# Patient Record
Sex: FEMALE | Race: WHITE | Employment: OTHER | URBAN - METROPOLITAN AREA
[De-identification: names, ages, dates, MRNs, and addresses within clinical notes are randomized per-mention and may not be internally consistent; named-entity substitution may affect disease eponyms.]

---

## 2017-01-10 ENCOUNTER — OFFICE VISIT (OUTPATIENT)
Dept: INTERNAL MEDICINE CLINIC | Age: 82
End: 2017-01-10

## 2017-01-10 VITALS
RESPIRATION RATE: 18 BRPM | TEMPERATURE: 98.3 F | SYSTOLIC BLOOD PRESSURE: 111 MMHG | BODY MASS INDEX: 25.66 KG/M2 | HEIGHT: 65 IN | OXYGEN SATURATION: 94 % | HEART RATE: 78 BPM | DIASTOLIC BLOOD PRESSURE: 68 MMHG | WEIGHT: 154 LBS

## 2017-01-10 DIAGNOSIS — R05.9 COUGH: Primary | ICD-10-CM

## 2017-01-10 DIAGNOSIS — E78.5 HYPERLIPIDEMIA LDL GOAL <130: ICD-10-CM

## 2017-01-10 RX ORDER — ATORVASTATIN CALCIUM 10 MG/1
10 TABLET, FILM COATED ORAL DAILY
Qty: 30 TAB | Refills: 0
Start: 2017-01-10 | End: 2017-03-28 | Stop reason: ALTCHOICE

## 2017-01-10 RX ORDER — ALBUTEROL SULFATE 90 UG/1
1 AEROSOL, METERED RESPIRATORY (INHALATION)
Qty: 1 INHALER | Refills: 1 | Status: SHIPPED | OUTPATIENT
Start: 2017-01-10 | End: 2017-02-20

## 2017-01-10 RX ORDER — ALBUTEROL SULFATE 2 MG/5ML
2 SYRUP ORAL
Qty: 473 ML | Refills: 2 | Status: SHIPPED | OUTPATIENT
Start: 2017-01-10 | End: 2019-01-01

## 2017-01-10 RX ORDER — AZITHROMYCIN 250 MG/1
250 TABLET, FILM COATED ORAL SEE ADMIN INSTRUCTIONS
Qty: 6 TAB | Refills: 1 | Status: SHIPPED | OUTPATIENT
Start: 2017-01-10 | End: 2017-01-15

## 2017-01-10 NOTE — MR AVS SNAPSHOT
Visit Information Date & Time Provider Department Dept. Phone Encounter #  
 1/10/2017 11:00 AM Michelle Germain MD [facility] [id number] Upcoming Health Maintenance Date Due ZOSTER VACCINE AGE 60> 11/18/1984 GLAUCOMA SCREENING Q2Y 11/18/1989 MEDICARE YEARLY EXAM 7/15/2017 Pneumococcal 65+ High/Highest Risk (2 of 2 - PPSV23) 11/1/2017 DTaP/Tdap/Td series (2 - Td) 4/28/2024 Allergies as of 1/10/2017  Review Complete On: 1/10/2017 By: Michelle Germain MD  
  
 Severity Noted Reaction Type Reactions Latex  08/26/2012    Unknown (comments) Pt unable to report Codeine  08/26/2012    Unknown (comments) Sulfa Dyne  08/26/2012    Other (comments) \"Crying jag\" Current Immunizations  Reviewed on 10/14/2016 Name Date Influenza High Dose Vaccine PF 10/4/2016 Influenza Vaccine 10/1/2014, 11/1/2012 Pneumococcal Vaccine (Unspecified Type) 11/1/2012 Tdap 4/28/2014  2:44 AM  
  
 Not reviewed this visit You Were Diagnosed With   
  
 Codes Comments Essential hypertension    -  Primary ICD-10-CM: I10 
ICD-9-CM: 401.9 Cough     ICD-10-CM: R05 ICD-9-CM: 169. 2 Vitals BP Pulse Temp Resp Height(growth percentile) Weight(growth percentile) 111/68 (BP 1 Location: Left arm, BP Patient Position: Sitting) 78 98.3 °F (36.8 °C) (Oral) 18 5' 5\" (1.651 m) 154 lb (69.9 kg) SpO2 BMI OB Status Smoking Status 94% 25.63 kg/m2 Postmenopausal Former Smoker BMI and BSA Data Body Mass Index Body Surface Area  
 25.63 kg/m 2 1.79 m 2 Preferred Pharmacy Pharmacy Name Phone [facility] [phone] Your Updated Medication List  
  
   
This list is accurate as of: 1/10/17 12:02 PM.  Always use your most recent med list.  
  
  
  
  
 * albuterol 90 mcg/actuation inhaler Commonly known as:  PROVENTIL HFA, VENTOLIN HFA, PROAIR HFA Take 2 puffs by inhalation every four (4) hours as needed for Wheezing or Shortness of Breath. * albuterol 90 mcg/actuation inhaler Commonly known as:  PROVENTIL HFA, VENTOLIN HFA, PROAIR HFA Take 1 Puff by inhalation every six (6) hours as needed for Wheezing. Please assist pt with administration regarding timing of inhalation. IF pt can not do then use albuterol syrup * albuterol 2 mg/5 mL syrup Commonly known as:  Alonzo Ma Take 5 mL by mouth four (4) times daily as needed. Please give pt if she is coughing. If not improving need rx and nebulizer. ALEVE 220 mg Cap Generic drug:  naproxen sodium Take  by mouth. atorvastatin 10 mg tablet Commonly known as:  LIPITOR Take 1 Tab by mouth daily. azithromycin 250 mg tablet Commonly known as:  Bennetta Plum Take 1 Tab by mouth See Admin Instructions for 5 days. * Cholecalciferol (Vitamin D3) 50,000 unit Cap Take 1 Cap by mouth every seven (7) days. * cholecalciferol 1,000 unit Cap Commonly known as:  VITAMIN D3 Take 1 capsule by mouth daily  
  
 escitalopram oxalate 10 mg tablet Commonly known as:  Dayan Skates Take 1 Tab by mouth daily. guaiFENesin 200 mg/5 mL Liqd Take 5 mL by mouth three (3) times daily. ibuprofen 400 mg tablet Commonly known as:  MOTRIN Take 400 mg by mouth every six (6) hours as needed for Pain.  
  
 multivitamin tablet Commonly known as:  ONE A DAY Take 1 Tab by mouth daily. pneumococcal 13 romle conj dip 0.5 mL Syrg injection Commonly known as:  PREVNAR-13  
0.5 mL by IntraMUSCular route once for 1 dose. Indications: PREVENTION OF STREPTOCOCCUS PNEUMONIAE INFECTION  
  
 * Notice: This list has 5 medication(s) that are the same as other medications prescribed for you. Read the directions carefully, and ask your doctor or other care provider to review them with you. Prescriptions Printed Refills  
 albuterol (PROVENTIL HFA, VENTOLIN HFA, PROAIR HFA) 90 mcg/actuation inhaler 1 Sig: Take 1 Puff by inhalation every six (6) hours as needed for Wheezing. Please assist pt with administration regarding timing of inhalation. IF pt can not do then use albuterol syrup Class: Print Route: Inhalation  
 albuterol (PROVENTIL, VENTOLIN) 2 mg/5 mL syrup 2 Sig: Take 5 mL by mouth four (4) times daily as needed. Please give pt if she is coughing. If not improving need rx and nebulizer. Class: Print Route: Oral  
 azithromycin (ZITHROMAX) 250 mg tablet 1 Sig: Take 1 Tab by mouth See Admin Instructions for 5 days. Class: Print Route: Oral  
  
Prescriptions Sent to Pharmacy Refills  
 pneumococcal 13 romel conj dip (PREVNAR-13) 0.5 mL syrg injection 0 Si.5 mL by IntraMUSCular route once for 1 dose. Indications: PREVENTION OF STREPTOCOCCUS PNEUMONIAE INFECTION Class: Normal  
 Pharmacy: [address] [place] [address] Ph #: [phone] Route: IntraMUSCular  
 guaiFENesin 200 mg/5 mL liqd 0 Sig: Take 5 mL by mouth three (3) times daily. Class: Normal  
 Pharmacy: [address] [place] [address] Ph #: [phone] Route: Oral  
  
Introducing [facility]! Nico Egan introduces [employer] patient portal. Now you can access parts of your medical record, email your doctor's office, and request medication refills online. 1. In your internet browser, go to [email or web address] 2. Click on the First Time User? Click Here link in the Sign In box. You will see the New Member Sign Up page. 3. Enter your [employer] Access Code exactly as it appears below. You will not need to use this code after youve completed the sign-up process. If you do not sign up before the expiration date, you must request a new code. · [employer] Access Code: [email or web address] Expires: 1/29/2017 12:31 PM 
 
4. Enter the last four digits of your Social Security Number (xxxx) and Date of Birth (mm/dd/yyyy) as indicated and click Submit. You will be taken to the next sign-up page. 5. Create a Ed[employer] ID. This will be your [employer] login ID and cannot be changed, so think of one that is secure and easy to remember. 6. Create a Ed[employer] password. You can change your password at any time. 7. Enter your Password Reset Question and Answer. This can be used at a later time if you forget your password. 8. Enter your e-mail address. You will receive e-mail notification when new information is available in [address] 9. Click Sign Up. You can now view and download portions of your medical record. 10. Click the Download Summary menu link to download a portable copy of your medical information. If you have questions, please visit the Frequently Asked Questions section of the [employer] website. Remember, [employer] is NOT to be used for urgent needs. For medical emergencies, dial 911. Now available from your iPhone and Android! Please provide this summary of care documentation to your next provider. Your primary care clinician is listed as Diane Caraballo. If you have any questions after today's visit, please call [phone].

## 2017-01-10 NOTE — PROGRESS NOTES
Have you been to the ER or urgent care clinic since your last visit? No     Have you been hospitalized since your last visit? No     Have you been seen or consulted any other health care provider outside of [address] [place] [address] since your last visit (including pap smears, colonoscopy screening)?    No

## 2017-01-10 NOTE — PROGRESS NOTES
Chief Complaint   Patient presents with    Wheezing     head pressure    cough   congestion   drainage  x 5 days      cough  Pt reports insidious onset of cough then became more abrupt about 3 days ago. Nanda Swan, daughter,  called on Sat and not coughing hard. Pt denies fever, night sweats, sinus pain, throat pain or ear pain. Pt was on guaifenesin and albuterol syrup in the the past but is no longer on them because pt did not need them. Pt reports she got sob due to increased congestion. Symptoms are moderate to severe at times when she has coughing spells. She is unable to do albuterol inhaler as her hand and coordination is off. Cholesterol  Pt is still on atorvastatin for cholesterol. She denies muscle aches/pain.  No se of meds    Past Medical History   Diagnosis Date    [facility]      colon CA in situ    Depression     Hematoma (nontraumatic) of breast      Dr. Amber freeman breast surgeon    High cholesterol     Hypertension     Osteoporosis      Dr. Yesica Cruz fall fractured vertebrae    Psychiatric disorder      depression    Umbilical hernia      Past Surgical History   Procedure Laterality Date    Hx hip replacement  2009     Right    Hx colectomy  1985    Hx pelvic fracture tx  2010    Hx orthopaedic  2012     T11 & L1 vertebrae fx    Hx heent       cateracts bilateral     Social History     Social History    Marital status:      Spouse name: N/A    Number of children: N/A    Years of education: N/A     Social History Main Topics    Smoking status: Former Smoker     Packs/day: 0.50     Quit date: 1/1/1970    Smokeless tobacco: Never Used    Alcohol use Yes      Comment: occasional    Drug use: No    Sexual activity: No     Other Topics Concern    None     Social History Narrative    [employer], [employer]    Can cook if she wants        3 children    2 daughters and 1 son        No smoke        Drink- wine, or bourbon or vodka 1 ounce     Family History   Problem Relation Age of Onset    Heart Disease Mother      Current Outpatient Prescriptions   Medication Sig Dispense Refill    pneumococcal 13 romel conj dip (PREVNAR-13) 0.5 mL syrg injection 0.5 mL by IntraMUSCular route once for 1 dose. Indications: PREVENTION OF STREPTOCOCCUS PNEUMONIAE INFECTION 0.5 mL 0    atorvastatin (LIPITOR) 10 mg tablet Take 1 Tab by mouth nightly. 90 Tab 0    albuterol (PROVENTIL, VENTOLIN) 2 mg/5 mL syrup Take 5 mL by mouth four (4) times daily as needed. Please give pt if she is coughing. If not improving need rx and nebulizer. 473 mL 2    escitalopram oxalate (LEXAPRO) 10 mg tablet Take 1 Tab by mouth daily. 90 Tab 1    Cholecalciferol, Vitamin D3, 1,000 unit cap Take 1 capsule by mouth daily 90 Cap 1    Cholecalciferol, Vitamin D3, 50,000 unit cap Take 1 Cap by mouth every seven (7) days. 12 Cap 1    naproxen sodium (ALEVE) 220 mg cap Take  by mouth.  albuterol (PROVENTIL HFA, VENTOLIN HFA, PROAIR HFA) 90 mcg/actuation inhaler Take 2 puffs by inhalation every four (4) hours as needed for Wheezing or Shortness of Breath. 1 Inhaler 0    ibuprofen (MOTRIN) 400 mg tablet Take 400 mg by mouth every six (6) hours as needed for Pain.  multivitamin (ONE A DAY) tablet Take 1 Tab by mouth daily.          Allergies   Allergen Reactions    Latex Unknown (comments)     Pt unable to report      Codeine Unknown (comments)    Sulfa Dyne Other (comments)     \"Crying jag\"       Review of Systems - General ROS: positive for  - sleep disturbance  negative for - chills, fatigue, fever, hot flashes, malaise, night sweats or weight loss  Cardiovascular ROS: no chest pain or dyspnea on exertion  Respiratory ROS: positive for - cough and shortness of breath  negative for - hemoptysis, orthopnea, pleuritic pain, sputum changes or stridor    Visit Vitals    /68 (BP 1 Location: Left arm, BP Patient Position: Sitting)    Pulse 78    Temp 98.3 °F (36.8 °C) (Oral)    Resp 18    Ht 5' 5\" (1.651 m)    Wt 154 lb (69.9 kg)    SpO2 94%    BMI 25.63 kg/m2     General Appearance:  Well developed, well nourished,alert and oriented x 3, and individual in no acute distress. Ears/Nose/Mouth/Throat:   Hearing grossly normal.         Neck: Supple, no lad, no bruits   Chest:   Lungs clear to auscultation bilaterally, no wheezing, upper airway breathing sounds when sitting in chair   Cardiovascular:  Regular rate and rhythm, S1, S2 normal, no murmur. Abdomen:   Soft, non-tender, bowel sounds are active. Extremities: No edema bilaterally. Skin: Warm and dry, no suspicious lesions                 Doug Gutierrez was seen today for wheezing. Diagnoses and all orders for this visit:      Cough  New problem. I think this is more of a viral etiology and rec initial conservative care. Discussed with pt and daughter, Abraham Boast.    -     albuterol (PROVENTIL, VENTOLIN) 2 mg/5 mL syrup; Take 5 mL by mouth four (4) times daily as needed. Please give pt if she is coughing. If not improving need rx and nebulizer. PT MAY need rx for albuterol nebulizer if albuterol inhaler or albuterol syrup not working. Discussed with daughter and will send in to facility if needed. Daughter would like to check if staff can help emmanuel with ihalations of proair. Other orders  -     pneumococcal 13 romel conj dip (PREVNAR-13) 0.5 mL syrg injection; 0.5 mL by IntraMUSCular route once for 1 dose. Indications: PREVENTION OF STREPTOCOCCUS PNEUMONIAE INFECTION  -     guaiFENesin 200 mg/5 mL liqd; Take 5 mL by mouth three (3) times daily. -     albuterol (PROVENTIL HFA, VENTOLIN HFA, PROAIR HFA) 90 mcg/actuation inhaler; Take 1 Puff by inhalation every six (6) hours as needed for Wheezing. Please assist pt with administration regarding timing of inhalation. IF pt can not do then use albuterol syrup  -     azithromycin (ZITHROMAX) 250 mg tablet;  Take 1 Tab by mouth See Admin Instructions for 5 days.  -     atorvastatin (LIPITOR) 10 mg tablet; Take 1 Tab by mouth daily. - will only take if sx adavance. Vu Houser will let me know if she takes      Cholesterol   Cont meds  I don't think she necessarily needs but family would like to continue this med        I spent 25 min with this patient and >50% of the time was spent on counseling and management of bronchitis. She has difficulty with inhaler so will try assistance with inhaler or may convert to nebulizer. This note will not be viewable in [address]

## 2017-02-20 ENCOUNTER — APPOINTMENT (OUTPATIENT)
Dept: CT IMAGING | Age: 82
DRG: 605 | End: 2017-02-20
Attending: NURSE PRACTITIONER
Payer: MEDICARE

## 2017-02-20 ENCOUNTER — HOSPITAL ENCOUNTER (INPATIENT)
Age: 82
LOS: 3 days | Discharge: NURSING, REHAB OR LONG-TERM CARE | DRG: 605 | End: 2017-02-23
Attending: EMERGENCY MEDICINE | Admitting: SURGERY
Payer: MEDICARE

## 2017-02-20 ENCOUNTER — APPOINTMENT (OUTPATIENT)
Dept: GENERAL RADIOLOGY | Age: 82
DRG: 605 | End: 2017-02-20
Attending: NURSE PRACTITIONER
Payer: MEDICARE

## 2017-02-20 DIAGNOSIS — S30.1XXA RECTUS SHEATH HEMATOMA, INITIAL ENCOUNTER: Primary | ICD-10-CM

## 2017-02-20 LAB
ALBUMIN SERPL BCP-MCNC: 3.7 G/DL (ref 3.5–5)
ALBUMIN/GLOB SERPL: 1.1 {RATIO} (ref 1.1–2.2)
ALP SERPL-CCNC: 75 U/L (ref 45–117)
ALT SERPL-CCNC: 28 U/L (ref 12–78)
ANION GAP BLD CALC-SCNC: 8 MMOL/L (ref 5–15)
AST SERPL W P-5'-P-CCNC: 18 U/L (ref 15–37)
BASOPHILS # BLD AUTO: 0 K/UL (ref 0–0.1)
BASOPHILS # BLD: 0 % (ref 0–1)
BILIRUB SERPL-MCNC: 0.3 MG/DL (ref 0.2–1)
BUN SERPL-MCNC: 18 MG/DL (ref 6–20)
BUN/CREAT SERPL: 19 (ref 12–20)
CALCIUM SERPL-MCNC: 9 MG/DL (ref 8.5–10.1)
CHLORIDE SERPL-SCNC: 105 MMOL/L (ref 97–108)
CO2 SERPL-SCNC: 28 MMOL/L (ref 21–32)
CREAT SERPL-MCNC: 0.96 MG/DL (ref 0.55–1.02)
EOSINOPHIL # BLD: 0.1 K/UL (ref 0–0.4)
EOSINOPHIL NFR BLD: 1 % (ref 0–7)
ERYTHROCYTE [DISTWIDTH] IN BLOOD BY AUTOMATED COUNT: 14.1 % (ref 11.5–14.5)
GLOBULIN SER CALC-MCNC: 3.3 G/DL (ref 2–4)
GLUCOSE SERPL-MCNC: 133 MG/DL (ref 65–100)
HCT VFR BLD AUTO: 39.9 % (ref 35–47)
HGB BLD-MCNC: 13.3 G/DL (ref 11.5–16)
LYMPHOCYTES # BLD AUTO: 14 % (ref 12–49)
LYMPHOCYTES # BLD: 2.1 K/UL (ref 0.8–3.5)
MCH RBC QN AUTO: 30.2 PG (ref 26–34)
MCHC RBC AUTO-ENTMCNC: 33.3 G/DL (ref 30–36.5)
MCV RBC AUTO: 90.7 FL (ref 80–99)
MONOCYTES # BLD: 1.1 K/UL (ref 0–1)
MONOCYTES NFR BLD AUTO: 7 % (ref 5–13)
NEUTS SEG # BLD: 12.3 K/UL (ref 1.8–8)
NEUTS SEG NFR BLD AUTO: 78 % (ref 32–75)
PLATELET # BLD AUTO: 274 K/UL (ref 150–400)
POTASSIUM SERPL-SCNC: 4.3 MMOL/L (ref 3.5–5.1)
PROT SERPL-MCNC: 7 G/DL (ref 6.4–8.2)
RBC # BLD AUTO: 4.4 M/UL (ref 3.8–5.2)
SODIUM SERPL-SCNC: 141 MMOL/L (ref 136–145)
WBC # BLD AUTO: 15.6 K/UL (ref 3.6–11)

## 2017-02-20 PROCEDURE — 65270000029 HC RM PRIVATE

## 2017-02-20 PROCEDURE — 74011250637 HC RX REV CODE- 250/637: Performed by: EMERGENCY MEDICINE

## 2017-02-20 PROCEDURE — 96374 THER/PROPH/DIAG INJ IV PUSH: CPT

## 2017-02-20 PROCEDURE — 80053 COMPREHEN METABOLIC PANEL: CPT | Performed by: NURSE PRACTITIONER

## 2017-02-20 PROCEDURE — 94761 N-INVAS EAR/PLS OXIMETRY MLT: CPT

## 2017-02-20 PROCEDURE — 70450 CT HEAD/BRAIN W/O DYE: CPT

## 2017-02-20 PROCEDURE — 96375 TX/PRO/DX INJ NEW DRUG ADDON: CPT

## 2017-02-20 PROCEDURE — 72125 CT NECK SPINE W/O DYE: CPT

## 2017-02-20 PROCEDURE — 36415 COLL VENOUS BLD VENIPUNCTURE: CPT | Performed by: NURSE PRACTITIONER

## 2017-02-20 PROCEDURE — 71010 XR CHEST SNGL V: CPT

## 2017-02-20 PROCEDURE — 85025 COMPLETE CBC W/AUTO DIFF WBC: CPT | Performed by: NURSE PRACTITIONER

## 2017-02-20 PROCEDURE — 74011250636 HC RX REV CODE- 250/636: Performed by: NURSE PRACTITIONER

## 2017-02-20 PROCEDURE — 99285 EMERGENCY DEPT VISIT HI MDM: CPT

## 2017-02-20 PROCEDURE — 74011636320 HC RX REV CODE- 636/320: Performed by: RADIOLOGY

## 2017-02-20 PROCEDURE — 74011250637 HC RX REV CODE- 250/637: Performed by: NURSE PRACTITIONER

## 2017-02-20 PROCEDURE — 71275 CT ANGIOGRAPHY CHEST: CPT

## 2017-02-20 RX ORDER — ACETAMINOPHEN 325 MG/1
650 TABLET ORAL
Status: COMPLETED | OUTPATIENT
Start: 2017-02-20 | End: 2017-02-20

## 2017-02-20 RX ORDER — ALBUTEROL SULFATE 90 UG/1
1 AEROSOL, METERED RESPIRATORY (INHALATION)
Status: DISCONTINUED | OUTPATIENT
Start: 2017-02-20 | End: 2017-02-21

## 2017-02-20 RX ORDER — DEXTROSE, SODIUM CHLORIDE, AND POTASSIUM CHLORIDE 5; .45; .15 G/100ML; G/100ML; G/100ML
100 INJECTION INTRAVENOUS CONTINUOUS
Status: DISCONTINUED | OUTPATIENT
Start: 2017-02-20 | End: 2017-02-21

## 2017-02-20 RX ORDER — HYDROCODONE BITARTRATE AND ACETAMINOPHEN 5; 325 MG/1; MG/1
1 TABLET ORAL
Status: DISCONTINUED | OUTPATIENT
Start: 2017-02-20 | End: 2017-02-23 | Stop reason: HOSPADM

## 2017-02-20 RX ORDER — ONDANSETRON 2 MG/ML
4 INJECTION INTRAMUSCULAR; INTRAVENOUS
Status: DISCONTINUED | OUTPATIENT
Start: 2017-02-20 | End: 2017-02-23 | Stop reason: HOSPADM

## 2017-02-20 RX ORDER — VALPROATE SODIUM 100 MG/ML
1000 INJECTION INTRAVENOUS
Status: DISCONTINUED | OUTPATIENT
Start: 2017-02-20 | End: 2017-02-20

## 2017-02-20 RX ORDER — BISMUTH SUBSALICYLATE 262 MG/15ML
30 LIQUID ORAL AS NEEDED
Status: DISCONTINUED | OUTPATIENT
Start: 2017-02-20 | End: 2017-02-23 | Stop reason: HOSPADM

## 2017-02-20 RX ORDER — DOCUSATE SODIUM 100 MG/1
100 CAPSULE, LIQUID FILLED ORAL 2 TIMES DAILY
Status: DISCONTINUED | OUTPATIENT
Start: 2017-02-21 | End: 2017-02-23 | Stop reason: HOSPADM

## 2017-02-20 RX ORDER — THERA TABS 400 MCG
1 TAB ORAL 2 TIMES DAILY
COMMUNITY
End: 2019-01-01

## 2017-02-20 RX ORDER — NALOXONE HYDROCHLORIDE 0.4 MG/ML
0.4 INJECTION, SOLUTION INTRAMUSCULAR; INTRAVENOUS; SUBCUTANEOUS AS NEEDED
Status: DISCONTINUED | OUTPATIENT
Start: 2017-02-20 | End: 2017-02-23 | Stop reason: HOSPADM

## 2017-02-20 RX ORDER — ESCITALOPRAM OXALATE 10 MG/1
10 TABLET ORAL DAILY
Status: DISCONTINUED | OUTPATIENT
Start: 2017-02-21 | End: 2017-02-23 | Stop reason: HOSPADM

## 2017-02-20 RX ORDER — BISMUTH SUBSALICYLATE 262 MG/15ML
30 LIQUID ORAL AS NEEDED
COMMUNITY
End: 2019-01-01

## 2017-02-20 RX ORDER — DEXAMETHASONE SODIUM PHOSPHATE 100 MG/10ML
10 INJECTION INTRAMUSCULAR; INTRAVENOUS
Status: DISCONTINUED | OUTPATIENT
Start: 2017-02-20 | End: 2017-02-20

## 2017-02-20 RX ORDER — ALBUTEROL SULFATE 2 MG/5ML
2 SYRUP ORAL
Status: DISCONTINUED | OUTPATIENT
Start: 2017-02-20 | End: 2017-02-21

## 2017-02-20 RX ORDER — MORPHINE SULFATE 2 MG/ML
2 INJECTION, SOLUTION INTRAMUSCULAR; INTRAVENOUS
Status: DISCONTINUED | OUTPATIENT
Start: 2017-02-20 | End: 2017-02-23 | Stop reason: HOSPADM

## 2017-02-20 RX ORDER — FLUTICASONE PROPIONATE 50 MCG
2 SPRAY, SUSPENSION (ML) NASAL DAILY
Status: DISCONTINUED | OUTPATIENT
Start: 2017-02-21 | End: 2017-02-23 | Stop reason: HOSPADM

## 2017-02-20 RX ORDER — ATORVASTATIN CALCIUM 10 MG/1
10 TABLET, FILM COATED ORAL DAILY
Status: DISCONTINUED | OUTPATIENT
Start: 2017-02-21 | End: 2017-02-23 | Stop reason: HOSPADM

## 2017-02-20 RX ORDER — SODIUM CHLORIDE 0.9 % (FLUSH) 0.9 %
5-10 SYRINGE (ML) INJECTION EVERY 8 HOURS
Status: DISCONTINUED | OUTPATIENT
Start: 2017-02-20 | End: 2017-02-23 | Stop reason: HOSPADM

## 2017-02-20 RX ORDER — TRAMADOL HYDROCHLORIDE 50 MG/1
100 TABLET ORAL
Status: DISCONTINUED | OUTPATIENT
Start: 2017-02-20 | End: 2017-02-20 | Stop reason: SDUPTHER

## 2017-02-20 RX ORDER — ACETAMINOPHEN 500 MG
1000 TABLET ORAL
COMMUNITY
End: 2018-01-01

## 2017-02-20 RX ORDER — ONDANSETRON 2 MG/ML
4 INJECTION INTRAMUSCULAR; INTRAVENOUS
Status: COMPLETED | OUTPATIENT
Start: 2017-02-20 | End: 2017-02-20

## 2017-02-20 RX ORDER — DIPHENHYDRAMINE HYDROCHLORIDE 50 MG/ML
12.5 INJECTION, SOLUTION INTRAMUSCULAR; INTRAVENOUS
Status: DISCONTINUED | OUTPATIENT
Start: 2017-02-20 | End: 2017-02-23 | Stop reason: HOSPADM

## 2017-02-20 RX ORDER — TRAMADOL HYDROCHLORIDE 50 MG/1
100 TABLET ORAL
Status: COMPLETED | OUTPATIENT
Start: 2017-02-20 | End: 2017-02-20

## 2017-02-20 RX ORDER — GUAIFENESIN 100 MG/5ML
200 SOLUTION ORAL 3 TIMES DAILY
Status: DISCONTINUED | OUTPATIENT
Start: 2017-02-21 | End: 2017-02-23 | Stop reason: HOSPADM

## 2017-02-20 RX ORDER — SODIUM CHLORIDE 0.9 % (FLUSH) 0.9 %
5-10 SYRINGE (ML) INJECTION AS NEEDED
Status: DISCONTINUED | OUTPATIENT
Start: 2017-02-20 | End: 2017-02-23 | Stop reason: HOSPADM

## 2017-02-20 RX ORDER — MORPHINE SULFATE 2 MG/ML
2 INJECTION, SOLUTION INTRAMUSCULAR; INTRAVENOUS
Status: COMPLETED | OUTPATIENT
Start: 2017-02-20 | End: 2017-02-20

## 2017-02-20 RX ORDER — SIMETHICONE 80 MG
80 TABLET,CHEWABLE ORAL
Status: COMPLETED | OUTPATIENT
Start: 2017-02-20 | End: 2017-02-20

## 2017-02-20 RX ORDER — LORAZEPAM 2 MG/ML
1 INJECTION INTRAMUSCULAR
Status: DISCONTINUED | OUTPATIENT
Start: 2017-02-20 | End: 2017-02-23 | Stop reason: HOSPADM

## 2017-02-20 RX ORDER — FLUTICASONE PROPIONATE 50 MCG
2 SPRAY, SUSPENSION (ML) NASAL DAILY
COMMUNITY
End: 2017-06-15 | Stop reason: SDUPTHER

## 2017-02-20 RX ADMIN — ONDANSETRON 4 MG: 2 INJECTION INTRAMUSCULAR; INTRAVENOUS at 16:39

## 2017-02-20 RX ADMIN — SIMETHICONE 80 MG: 80 TABLET, CHEWABLE ORAL at 16:58

## 2017-02-20 RX ADMIN — TRAMADOL HYDROCHLORIDE 100 MG: 50 TABLET, FILM COATED ORAL at 21:36

## 2017-02-20 RX ADMIN — IOPAMIDOL 100 ML: 755 INJECTION, SOLUTION INTRAVENOUS at 18:43

## 2017-02-20 RX ADMIN — ACETAMINOPHEN 650 MG: 325 TABLET ORAL at 15:01

## 2017-02-20 RX ADMIN — Medication 2 MG: at 16:41

## 2017-02-20 NOTE — ED PROVIDER NOTES
HPI Comments: Patient here for eval s/p fall. Patient unable to specifically recall what happened, but nurses at care facility told EMS patient fell when standing up from walker and struck herself on the hinge of a door. She did not lose consciousness with the fall. It was a mechanical fall, which the daughter reports happens frequently because the patient has severe arthritis in both knees and has associated difficulty with transitions. She c/o pain to the back of her head/neck/back. She has not had vomiting, denies chest pain or difficulty breathing, and denies abdominal pain at this time. She denies leg or arm weakness or paresthesia. PmHx: Dementia, HTN, osteoporosis spine fracture (L1) tx with orthosis x ~ 4 years ago. Meds: lipitor, lexapro, oscal, MVI,   PCP: Garretstephanie    Patient is a 80 y.o. female presenting with fall. The history is provided by the EMS personnel and a relative. History limited by: baseline dementia. Fall          Past Medical History:   Diagnosis Date    Arthritis      Dr. Arnaud Guzman [facility])      colon CA in situ    Depression     Hematoma (nontraumatic) of breast      Dr. Jazmin freeman breast surgeon    High cholesterol     Hypertension     Osteoporosis      Dr. Bain Felling fall fractured vertebrae    Psychiatric disorder      depression    Umbilical hernia        Past Surgical History:   Procedure Laterality Date    Hx hip replacement  2009     Right    Hx colectomy  1985    Hx pelvic fracture tx  2010    Hx orthopaedic  2012     T11 & L1 vertebrae fx    Hx heent       cateracts bilateral         Family History:   Problem Relation Age of Onset    Heart Disease Mother        Social History     Social History    Marital status:      Spouse name: N/A    Number of children: N/A    Years of education: N/A     Occupational History    Not on file.      Social History Main Topics    Smoking status: Former Smoker     Packs/day: 0.50     Quit date: 1/1/1970  Smokeless tobacco: Never Used    Alcohol use Yes      Comment: occasional    Drug use: No    Sexual activity: No     Other Topics Concern    Not on file     Social History Narrative    [employer], [employer]    Can cook if she wants        3 children    2 daughters and 1 son        No smoke        Drink- wine, or bourbon or vodka 1 ounce         ALLERGIES: Latex; Codeine; and Sulfa dyne    Review of Systems    Vitals:    02/20/17 1352 02/20/17 1506 02/20/17 1510   BP: 148/83  (!) 178/92   Pulse: 73 72    Resp: 18 16    SpO2: 95% 94%    Weight: 72.6 kg (160 lb)     Height: 5' 7\" (1.702 m)              Physical Exam   Constitutional: She appears well-developed and well-nourished. HENT:   Head: Normocephalic. Right Ear: External ear normal.   Left Ear: External ear normal.   Nose: Nose normal.   Mouth/Throat: Oropharynx is clear and moist.   Ecchymosis with mild hematoma to left occipital region. Hearing deficit   Eyes: Conjunctivae are normal. Pupils are equal, round, and reactive to light. Neck:   Diffuse tenderness to c-spine, localizes to lower t-spine. Cardiovascular: Normal rate, regular rhythm, normal heart sounds and intact distal pulses. Pulmonary/Chest: Effort normal and breath sounds normal.   Abdominal: Soft. There is no rebound and no guarding. Musculoskeletal: Normal range of motion. She exhibits no edema or deformity. Neurological: She is alert. No cranial nerve deficit. Coordination normal.   Patient oriented to person, but not place or time upon arrival. Daughter reports she is at baseline mental status. Sensation intact bilateral upper and lower extremities. Motor strength 4/6 bilateral upper and lower extremities. Skin: Skin is warm and dry. Right wrist with superficial abrasion/skin tear   Psychiatric: Her behavior is normal.   Nursing note and vitals reviewed.        MDM  Number of Diagnoses or Management Options  Diagnosis management comments: ED Course: while in ED patient began to c/o abdominal pain, which was an interval change after my initial exam. She c/o tenderness on the right side  Of her abdomen, so a CT Chest/abdomen/pelvis was obtained. A hematoma was identified in the right rectus sheath. It appears stable, as the patient was observed in the ED for several hours without worsening of pain, without tachycardia, and without increase in tenderness. Acute/subacute T1 vertebral compression deformity along the inferior endplate. Less than 50% loss of vertebral body height. No significant cortical Retropulsion. There is an inhomogeneous rectus sheath hematoma, measuring 11.7 cm x 6.5 cm x 11.3 cm    79 y/o female not on anticoagulants who is s/p witnessed mechanical fall at [facility] this morning. No LOC, but did strike head ?on door. A new fx (compression of T1 endplate) was identified on c-spine CT today. She has no paresthesias or weakness or neurologic findings. D/W Ortho, who advised this is a stable injury and they may f/u with her on outpatient basis. However, in the ED she did develop abdominal pain and a sheath hematoma was identified on CT scan. General surgery will admit for observation to ensure hematoma not worsening.         Amount and/or Complexity of Data Reviewed  Clinical lab tests: ordered and reviewed  Tests in the radiology section of CPT®: reviewed and ordered  Discuss the patient with other providers: yes Mina Mari from general surgery will admit patient)      ED Course       Procedures

## 2017-02-20 NOTE — IP AVS SNAPSHOT
[address] 1007 [facility] 
[phone] Patient: Zuri Bradley MRN: [id number] You are allergic to the following Allergen Reactions Latex Unknown (comments) Pt unable to report Codeine Unknown (comments) Sulfa Dyne Other (comments) \"Crying jag\" Recent Documentation Height  
  
  
  
  
  
 1.702 m Emergency Contacts Name Discharge Info Relation Home Work Mobile Crystal Heath [2] [phone] About your hospitalization You were admitted on:  2017 You last received care in the:  [facility] 5M1 MED SURG 1 You were discharged on:  2017 Unit phone number:  [phone] Why you were hospitalized Your primary diagnosis was:  Rectus Sheath Hematoma Your diagnoses also included:  Thoracic Compression Fracture (Hcc), Psychiatric Disorder, Hypertension, Depression, High Cholesterol Providers Seen During Your Hospitalizations Provider Role Specialty Primary office phone Zohreh Arias MD Attending Provider Emergency Medicine [phone] Laron Iqbal MD Attending Provider General Surgery [phone] Your Primary Care Physician (PCP) Primary Care Physician Office Phone Office Fax [facility] Space [phone] Follow-up Information Follow up With Details Comments Contact Info Blanca Dewitt MD On 2017 11:00am appointment [address] [facility] [address] [place] [address] 
[phone] [address] [address] 
[phone] Your Appointments 2017 11:00 AM EST TRANSITIONAL CARE MANAGEMENT with Blanca Dewitt MD  
[facility]) [address] [place] [address] No 1007 [facility]  
[phone] Current Discharge Medication List  
  
START taking these medications Dose & Instructions Dispensing Information Comments Morning Noon Evening Bedtime  
 traMADol 50 mg tablet Commonly known as:  ULTRAM  
   
Your next dose is: Today, Tomorrow Other:  _________ Dose:  50 mg Take 1 Tab by mouth every six (6) hours as needed for Pain. Max Daily Amount: 200 mg. Quantity:  30 Tab Refills:  0 CONTINUE these medications which have CHANGED Dose & Instructions Dispensing Information Comments Morning Noon Evening Bedtime * albuterol 90 mcg/actuation inhaler Commonly known as:  PROVENTIL HFA, VENTOLIN HFA, PROAIR HFA What changed:   
- how much to take - when to take this Your next dose is: Today, Tomorrow Other:  _________ Dose:  2 Puff Take 2 puffs by inhalation every four (4) hours as needed for Wheezing or Shortness of Breath. Quantity:  1 Inhaler Refills:  0  
     
   
   
   
  
 * albuterol 2 mg/5 mL syrup Commonly known as:  Arvind Evans What changed:  Another medication with the same name was changed. Make sure you understand how and when to take each. Your next dose is: Today, Tomorrow Other:  _________ Dose:  2 mg Take 5 mL by mouth four (4) times daily as needed. Please give pt if she is coughing. If not improving need rx and nebulizer. Quantity:  473 mL Refills:  2  
     
   
   
   
  
 guaiFENesin 200 mg/5 mL Liqd What changed:  how much to take Your next dose is: Today, Tomorrow Other:  _________ Dose:  5 mL Take 5 mL by mouth three (3) times daily. Quantity:  118 mL Refills:  0  
     
   
   
   
  
 * Notice:   This list has 2 medication(s) that are the same as other medications prescribed for you. Read the directions carefully, and ask your doctor or other care provider to review them with you. CONTINUE these medications which have NOT CHANGED Dose & Instructions Dispensing Information Comments Morning Noon Evening Bedtime  
 acetaminophen 500 mg tablet Commonly known as:  TYLENOL Your next dose is: Today, Tomorrow Other:  _________ Dose:  1000 mg Take 1,000 mg by mouth every four (4) hours as needed for Pain. Refills:  0  
     
   
   
   
  
 atorvastatin 10 mg tablet Commonly known as:  LIPITOR Your next dose is: Today, Tomorrow Other:  _________ Dose:  10 mg Take 1 Tab by mouth daily. Quantity:  30 Tab Refills:  0  
     
   
   
   
  
 BISMATROL 262 mg/15 mL suspension Generic drug:  bismuth subsalicylate Your next dose is: Today, Tomorrow Other:  _________ Dose:  30 mL Take 30 mL by mouth as needed (After each loose stool). Refills:  0  
     
   
   
   
  
 cholecalciferol 1,000 unit Cap Commonly known as:  VITAMIN D3 Your next dose is: Today, Tomorrow Other:  _________ Take 1 capsule by mouth daily Quantity:  90 Cap Refills:  1  
     
   
   
   
  
 escitalopram oxalate 10 mg tablet Commonly known as:  Darcus Skillern Your next dose is: Today, Tomorrow Other:  _________ Dose:  10 mg Take 1 Tab by mouth daily. Quantity:  90 Tab Refills:  1  
     
   
   
   
  
 fluticasone 50 mcg/actuation nasal spray Commonly known as:  Shelvia Birks Your next dose is: Today, Tomorrow Other:  _________ Dose:  2 Spray 2 Sprays by Both Nostrils route daily. Refills:  0 PRESERVISION AREDS Cap capsule Generic drug:  Vit A,C,E-Zinc-Copper Your next dose is: Today, Tomorrow Other:  _________ Dose:  1 Cap Take 1 Cap by mouth two (2) times a day. Refills:  0  
     
   
   
   
  
 therapeutic multivitamin tablet Commonly known as:  [facility] Your next dose is: Today, Tomorrow Other:  _________ Dose:  1 Tab Take 1 Tab by mouth daily. Refills:  0 Where to Get Your Medications Information on where to get these meds will be given to you by the nurse or doctor. ! Ask your nurse or doctor about these medications  
  traMADol 50 mg tablet Discharge Instructions Hematoma: Care Instructions Your Care Instructions A hematoma is a bad bruise. It happens when an injury causes blood to collect and pool under the skin. The pooling blood gives the skin a spongy, rubbery, lumpy feel. A hematoma usually is not a cause for concern. It is not the same thing as a blood clot in a vein, and it does not cause blood clots. Follow-up care is a key part of your treatment and safety. Be sure to make and go to all appointments, and call your doctor if you are having problems. It's also a good idea to know your test results and keep a list of the medicines you take. How can you care for yourself at home? · Rest and protect the bruised area. · Put ice or a cold pack on the area for 10 to 20 minutes at a time. · Prop up the bruised area on a pillow when you ice it or anytime you sit or lie down during the next 3 days. Try to keep it above the level of your heart. This will help reduce swelling. · Wrapping the bruised area with an elastic bandage such as an Ace wrap will help decrease swelling. Don't wrap it too tightly, as this can cause more swelling below the affected area. · Take an over-the-counter pain medicine, such as acetaminophen (Tylenol), ibuprofen (Advil, Motrin), or naproxen (Aleve). · Do not take two or more pain medicines at the same time unless the doctor told you to.  Many pain medicines have acetaminophen, which is Tylenol. Too much acetaminophen (Tylenol) can be harmful. When should you call for help? Call your doctor now or seek immediate medical care if: 
· You have signs of skin infection, such as: 
¨ Increased pain, swelling, warmth, or redness. ¨ Red streaks leading from the area. ¨ Pus draining from the area. ¨ A fever. Watch closely for changes in your health, and be sure to contact your doctor if: · The bruise lasts longer than 4 weeks. · The bruise gets bigger or becomes more painful. · You do not get better as expected. Where can you learn more? Go to http://suzanne-abdi.info/. Enter P911 in the search box to learn more about \"Hematoma: Care Instructions. \" Current as of: May 27, 2016 Content Version: 11.1 © 7960-2040 [employer]. Care instructions adapted under license by [employer] (which disclaims liability or warranty for this information). If you have questions about a medical condition or this instruction, always ask your healthcare professional. [place] [address] any warranty or liability for your use of this information. Follow up with you PCP in the next 2 weeks Follow up with Dr Fatemeh Yi as needed Kerry Bains MD, PhD, [facility] Surgery at [address], [address] [facility] 
[phone] Fax [phone] Discharge Orders None Introducing [facility]! Franci Baumann introduces [employer] patient portal. Now you can access parts of your medical record, email your doctor's office, and request medication refills online. 1. In your internet browser, go to [email or web address] 2. Click on the First Time User? Click Here link in the Sign In box. You will see the New Member Sign Up page. 3. Enter your [employer] Access Code exactly as it appears below.  You will not need to use this code after youve completed the sign-up process. If you do not sign up before the expiration date, you must request a new code. · [employer] Access Code: [email or web address] Expires: 5/21/2017  1:54 PM 
 
4. Enter the last four digits of your Social Security Number (xxxx) and Date of Birth (mm/dd/yyyy) as indicated and click Submit. You will be taken to the next sign-up page. 5. Create a [employer] ID. This will be your [employer] login ID and cannot be changed, so think of one that is secure and easy to remember. 6. Create a [employer] password. You can change your password at any time. 7. Enter your Password Reset Question and Answer. This can be used at a later time if you forget your password. 8. Enter your e-mail address. You will receive e-mail notification when new information is available in [address] 9. Click Sign Up. You can now view and download portions of your medical record. 10. Click the Download Summary menu link to download a portable copy of your medical information. If you have questions, please visit the Frequently Asked Questions section of the [employer] website. Remember, [employer] is NOT to be used for urgent needs. For medical emergencies, dial 911. Now available from your iPhone and Android! General Information Please provide this summary of care documentation to your next provider. Patient Signature:  ____________________________________________________________ Date:  ____________________________________________________________  
  
Sayda Wilkinson Provider Signature:  ____________________________________________________________ Date:  ____________________________________________________________

## 2017-02-20 NOTE — ED NOTES
Pt having nauseas and dry heaving. Provider aware and orders placed for zofran. Pt medicated as directed.

## 2017-02-20 NOTE — ED TRIAGE NOTES
EMS reports GLF after falling when trying to get up from a walker chair. Hit her head on the door as she fell. No LOC. Coming from [facility]. Staff there reports patient remained at baseline. Not on blood thinners.

## 2017-02-20 NOTE — PROGRESS NOTES
[facility]: MED RECONCILIATION    Comments/Recommendations:  Patient lives at [address] [facility] [address] assisted living, and a [facility] was requested for accurate medication history and timing of last dose administrations. Medications added:     · Acetaminophen  · Bismuth Subsalicylate  · Fluticasone nasal spray    Medications removed:    · Vitamin D3 50,000 units  · Naproxen    Medications adjusted:    · None    Information obtained from:   Transfer paperwork    Significant PMH/Disease States:   Patient Active Problem List   Diagnosis Code    Compression fracture of L1 lumbar vertebra (HCC) S32.010A    Back pain M54.9    Fall at home [address] Jimmy Capone, Y92.099    Arthritis M19.90    Essential hypertension I10    Dysthymia F34.1    Cancer ([address] C80.1    Psychiatric disorder F99    High cholesterol E78.00    Hypertension I10    Hematoma (nontraumatic) of breast N64.89    Osteoporosis M81.0    Depression F32.9    Advanced care planning/counseling discussion Z71.89     Past Medical History   Diagnosis Date    Arthritis      Dr. Dolores Daly [facility])      colon CA in situ    Depression     Hematoma (nontraumatic) of breast      Dr. Myrtle V[facility]quez [facility] breast surgeon    High cholesterol     Hypertension     Osteoporosis      Dr. Patricia Tsai fall fractured vertebrae    Psychiatric disorder      depression    Umbilical hernia      Chief Complaint for this Admission:   Chief Complaint   Patient presents with    Fall     Allergies: Latex; Codeine; and Sulfa dyne    Prior to Admission Medications:   Prior to Admission Medications   Prescriptions Last Dose Informant Patient Reported? Taking? Cholecalciferol, Vitamin D3, 1,000 unit cap 2/20/2017 at 0900 Transfer Papers No Yes   Sig: Take 1 capsule by mouth daily   Vit A,C,E-Zinc-Copper (PRESERVISION AREDS) cap capsule 2/20/2017 at 0900 Transfer Papers Yes Yes   Sig: Take 1 Cap by mouth two (2) times a day.    acetaminophen (TYLENOL) 500 mg tablet Unknown at Unknown time Transfer Papers Yes No   Sig: Take 1,000 mg by mouth every four (4) hours as needed for Pain. albuterol (PROVENTIL HFA, VENTOLIN HFA, PROAIR HFA) 90 mcg/actuation inhaler Unknown at Unknown time Transfer Papers No No   Sig: Take 2 puffs by inhalation every four (4) hours as needed for Wheezing or Shortness of Breath. Patient taking differently: Take 1 Puff by inhalation every six (6) hours as needed for Wheezing or Shortness of Breath. albuterol (PROVENTIL, VENTOLIN) 2 mg/5 mL syrup  Transfer Papers No No   Sig: Take 5 mL by mouth four (4) times daily as needed. Please give pt if she is coughing. If not improving need rx and nebulizer. atorvastatin (LIPITOR) 10 mg tablet 2017 at 0900 Transfer Papers No Yes   Sig: Take 1 Tab by mouth daily. bismuth subsalicylate (BISMATROL) 262 mg/15 mL suspension Unknown at Unknown time Transfer Papers Yes No   Sig: Take 30 mL by mouth as needed (After each loose stool). escitalopram oxalate (LEXAPRO) 10 mg tablet 2017 at 0900 Transfer Papers No Yes   Sig: Take 1 Tab by mouth daily. fluticasone (FLONASE) 50 mcg/actuation nasal spray 2017 at 0900 Transfer Papers Yes Yes   Si Sprays by Both Nostrils route daily. guaiFENesin 200 mg/5 mL liqd 2017 at 0900 Transfer Papers No Yes   Sig: Take 5 mL by mouth three (3) times daily. Patient taking differently: Take 10 mL by mouth three (3) times daily. therapeutic multivitamin (THERAGRAN) tablet 2017 at 0900 Transfer Papers Yes Yes   Sig: Take 1 Tab by mouth daily.       Facility-Administered Medications: None     Ely Meza, PharmD, BCPS  Contact:

## 2017-02-20 NOTE — IP AVS SNAPSHOT
Current Discharge Medication List  
  
Take these medications at their scheduled times Dose & Instructions Dispensing Information Comments Morning Noon Evening Bedtime  
 atorvastatin 10 mg tablet Commonly known as:  LIPITOR Your next dose is: Today, Tomorrow Other:  ____________ Dose:  10 mg Take 1 Tab by mouth daily. Quantity:  30 Tab Refills:  0  
     
   
   
   
  
 escitalopram oxalate 10 mg tablet Commonly known as:  Akbar Madi Your next dose is: Today, Tomorrow Other:  ____________ Dose:  10 mg Take 1 Tab by mouth daily. Quantity:  90 Tab Refills:  1  
     
   
   
   
  
 fluticasone 50 mcg/actuation nasal spray Commonly known as:  Caffie Euler Your next dose is: Today, Tomorrow Other:  ____________ Dose:  2 Spray 2 Sprays by Both Nostrils route daily. Refills:  0  
     
   
   
   
  
 guaiFENesin 200 mg/5 mL Liqd Your next dose is: Today, Tomorrow Other:  ____________ Dose:  5 mL Take 5 mL by mouth three (3) times daily. Quantity:  118 mL Refills:  0 PRESERVISION AREDS Cap capsule Generic drug:  Vit A,C,E-Zinc-Copper Your next dose is: Today, Tomorrow Other:  ____________ Dose:  1 Cap Take 1 Cap by mouth two (2) times a day. Refills:  0  
     
   
   
   
  
 therapeutic multivitamin tablet Commonly known as:  [facility] Your next dose is: Today, Tomorrow Other:  ____________ Dose:  1 Tab Take 1 Tab by mouth daily. Refills:  0 Take these medications as needed Dose & Instructions Dispensing Information Comments Morning Noon Evening Bedtime  
 acetaminophen 500 mg tablet Commonly known as:  TYLENOL Your next dose is: Today, Tomorrow Other:  ____________  Dose:  1000 mg  
 Take 1,000 mg by mouth every four (4) hours as needed for Pain. Refills:  0  
     
   
   
   
  
 * albuterol 90 mcg/actuation inhaler Commonly known as:  PROVENTIL HFA, VENTOLIN HFA, PROAIR HFA Your next dose is: Today, Tomorrow Other:  ____________ Dose:  2 Puff Take 2 puffs by inhalation every four (4) hours as needed for Wheezing or Shortness of Breath. Quantity:  1 Inhaler Refills:  0  
     
   
   
   
  
 * albuterol 2 mg/5 mL syrup Commonly known as:  Juan Alexis Your next dose is: Today, Tomorrow Other:  ____________ Dose:  2 mg Take 5 mL by mouth four (4) times daily as needed. Please give pt if she is coughing. If not improving need rx and nebulizer. Quantity:  473 mL Refills:  2 BISMATROL 262 mg/15 mL suspension Generic drug:  bismuth subsalicylate Your next dose is: Today, Tomorrow Other:  ____________ Dose:  30 mL Take 30 mL by mouth as needed (After each loose stool). Refills:  0  
     
   
   
   
  
 traMADol 50 mg tablet Commonly known as:  ULTRAM  
   
Your next dose is: Today, Tomorrow Other:  ____________ Dose:  50 mg Take 1 Tab by mouth every six (6) hours as needed for Pain. Max Daily Amount: 200 mg. Quantity:  30 Tab Refills:  0  
     
   
   
   
  
 * Notice: This list has 2 medication(s) that are the same as other medications prescribed for you. Read the directions carefully, and ask your doctor or other care provider to review them with you. Take these medications as directed Dose & Instructions Dispensing Information Comments Morning Noon Evening Bedtime  
 cholecalciferol 1,000 unit Cap Commonly known as:  VITAMIN D3 Your next dose is: Today, Tomorrow Other:  ____________ Take 1 capsule by mouth daily Quantity:  90 Cap Refills:  1 Where to Get Your Medications Information about where to get these medications is not yet available ! Ask your nurse or doctor about these medications  
  traMADol 50 mg tablet

## 2017-02-21 PROBLEM — S22.000A THORACIC COMPRESSION FRACTURE (HCC): Status: ACTIVE | Noted: 2017-02-21

## 2017-02-21 LAB
ANION GAP BLD CALC-SCNC: 5 MMOL/L (ref 5–15)
BUN SERPL-MCNC: 17 MG/DL (ref 6–20)
BUN/CREAT SERPL: 22 (ref 12–20)
CALCIUM SERPL-MCNC: 8.1 MG/DL (ref 8.5–10.1)
CHLORIDE SERPL-SCNC: 106 MMOL/L (ref 97–108)
CO2 SERPL-SCNC: 29 MMOL/L (ref 21–32)
CREAT SERPL-MCNC: 0.76 MG/DL (ref 0.55–1.02)
ERYTHROCYTE [DISTWIDTH] IN BLOOD BY AUTOMATED COUNT: 14.5 % (ref 11.5–14.5)
GLUCOSE SERPL-MCNC: 141 MG/DL (ref 65–100)
HCT VFR BLD AUTO: 34.1 % (ref 35–47)
HGB BLD-MCNC: 10.3 G/DL (ref 11.5–16)
MAGNESIUM SERPL-MCNC: 2.1 MG/DL (ref 1.6–2.4)
MCH RBC QN AUTO: 28.5 PG (ref 26–34)
MCHC RBC AUTO-ENTMCNC: 30.2 G/DL (ref 30–36.5)
MCV RBC AUTO: 94.5 FL (ref 80–99)
PHOSPHATE SERPL-MCNC: 3.8 MG/DL (ref 2.6–4.7)
PLATELET # BLD AUTO: 228 K/UL (ref 150–400)
POTASSIUM SERPL-SCNC: 4.5 MMOL/L (ref 3.5–5.1)
RBC # BLD AUTO: 3.61 M/UL (ref 3.8–5.2)
SODIUM SERPL-SCNC: 140 MMOL/L (ref 136–145)
WBC # BLD AUTO: 8.2 K/UL (ref 3.6–11)

## 2017-02-21 PROCEDURE — 36415 COLL VENOUS BLD VENIPUNCTURE: CPT | Performed by: INTERNAL MEDICINE

## 2017-02-21 PROCEDURE — 74011250636 HC RX REV CODE- 250/636: Performed by: SURGERY

## 2017-02-21 PROCEDURE — 94640 AIRWAY INHALATION TREATMENT: CPT

## 2017-02-21 PROCEDURE — 74011250637 HC RX REV CODE- 250/637: Performed by: SURGERY

## 2017-02-21 PROCEDURE — 84100 ASSAY OF PHOSPHORUS: CPT | Performed by: INTERNAL MEDICINE

## 2017-02-21 PROCEDURE — 85027 COMPLETE CBC AUTOMATED: CPT | Performed by: SURGERY

## 2017-02-21 PROCEDURE — 83735 ASSAY OF MAGNESIUM: CPT | Performed by: INTERNAL MEDICINE

## 2017-02-21 PROCEDURE — 65270000029 HC RM PRIVATE

## 2017-02-21 PROCEDURE — 74011000258 HC RX REV CODE- 258: Performed by: INTERNAL MEDICINE

## 2017-02-21 PROCEDURE — 77030013140 HC MSK NEB VYRM -A

## 2017-02-21 PROCEDURE — 80048 BASIC METABOLIC PNL TOTAL CA: CPT | Performed by: SURGERY

## 2017-02-21 PROCEDURE — 74011000250 HC RX REV CODE- 250: Performed by: INTERNAL MEDICINE

## 2017-02-21 RX ORDER — TRAMADOL HYDROCHLORIDE 50 MG/1
50 TABLET ORAL
Qty: 30 TAB | Refills: 0 | Status: SHIPPED | OUTPATIENT
Start: 2017-02-21 | End: 2019-01-01

## 2017-02-21 RX ORDER — ALBUTEROL SULFATE 0.83 MG/ML
2.5 SOLUTION RESPIRATORY (INHALATION)
Status: DISCONTINUED | OUTPATIENT
Start: 2017-02-21 | End: 2017-02-23 | Stop reason: HOSPADM

## 2017-02-21 RX ORDER — DEXTROSE MONOHYDRATE AND SODIUM CHLORIDE 5; .45 G/100ML; G/100ML
50 INJECTION, SOLUTION INTRAVENOUS CONTINUOUS
Status: DISCONTINUED | OUTPATIENT
Start: 2017-02-21 | End: 2017-02-23 | Stop reason: HOSPADM

## 2017-02-21 RX ADMIN — DEXTROSE MONOHYDRATE, SODIUM CHLORIDE, AND POTASSIUM CHLORIDE 125 ML/HR: 50; 4.5; 1.49 INJECTION, SOLUTION INTRAVENOUS at 00:22

## 2017-02-21 RX ADMIN — ESCITALOPRAM OXALATE 10 MG: 10 TABLET ORAL at 09:50

## 2017-02-21 RX ADMIN — DOCUSATE SODIUM 100 MG: 100 CAPSULE ORAL at 17:24

## 2017-02-21 RX ADMIN — FLUTICASONE PROPIONATE 2 SPRAY: 50 SPRAY, METERED NASAL at 09:51

## 2017-02-21 RX ADMIN — GUAIFENESIN 200 MG: 100 SOLUTION ORAL at 09:50

## 2017-02-21 RX ADMIN — GUAIFENESIN 200 MG: 100 SOLUTION ORAL at 22:23

## 2017-02-21 RX ADMIN — ATORVASTATIN CALCIUM 10 MG: 10 TABLET, FILM COATED ORAL at 09:50

## 2017-02-21 RX ADMIN — Medication 10 ML: at 00:22

## 2017-02-21 RX ADMIN — DEXTROSE AND SODIUM CHLORIDE 50 ML/HR: 5; 450 INJECTION, SOLUTION INTRAVENOUS at 09:51

## 2017-02-21 RX ADMIN — ALBUTEROL SULFATE 2.5 MG: 2.5 SOLUTION RESPIRATORY (INHALATION) at 17:33

## 2017-02-21 RX ADMIN — GUAIFENESIN 200 MG: 100 SOLUTION ORAL at 17:24

## 2017-02-21 RX ADMIN — DOCUSATE SODIUM 100 MG: 100 CAPSULE ORAL at 09:50

## 2017-02-21 NOTE — ED NOTES
Bedside and Verbal shift change report given to DANNIELLE Lopez (oncoming nurse) by RN Dianah Dubin offgoing nurse). Report included the following information SBAR, Kardex, ED Summary, MAR, Recent Results and Med Rec Status.

## 2017-02-21 NOTE — PROGRESS NOTES
Primary Nurse Pitre Olivares RN and [facility], RN performed a dual skin assessment on this patient Impairment noted- see wound doc flow sheet  Umesh score is 16

## 2017-02-21 NOTE — ED NOTES
Pt ambulated to BR 2 person assist, very unsteady. CYU produced. Pt continues to deny pain at present. Daughter desires more pain meds for mother \"to keep ahead of the pain. \" Will admin tramadol per order.

## 2017-02-21 NOTE — ED NOTES
Report received from Melva. Assumed care of pt. Introduced self as Primary RN. Bed locked and in low position. Plan discussed with pt and understanding verbalized. Pt denies additional complaints at this time. . Pt advised that medical info will be discussed and it is their own responsibility to tell nurse if such conversation should not take place in the presence of visitors. Pt verbalizes understanding. Pt denies pain at this time. Spot O2 check revealed sat of 90% on RA. Pt placed back on 2L NC.  Denies pain at present

## 2017-02-21 NOTE — CONSULTS
Sherif Martel [facility] [place] [address]  5[address] [facility], [address] [place] [address]  [phone]    Consult History and Physical Exam    NAME:  Abdi Carlos   :   1924   MRN:  [id number]     PCP:  Carlos Alberto Swain MD   Referring: Meir Hines MD     Date/Time:  2017         Subjective:   REASON FOR CONSULT: HTN    CHIEF COMPLAINT: abd pain     HISTORY OF PRESENT ILLNESS:     The patient is a 79 yo hx of HTN, depression, presented w/ a rectus sheath hematoma s/p GLF. Medicine was consulted for medical management. The patient stated that she had a mechanical fall yesterday. Denied chest pain, SOB, fevers, chills, syncope. She does have mild abd pain. Denied blood thinners. In the ED, CT scan confirmed a T1 compression fracture and a R sided rectus sheath hematoma. Gen surg admitted the patient for further monitoring. Allergies   Allergen Reactions    Latex Unknown (comments)     Pt unable to report      Codeine Unknown (comments)    Sulfa Dyne Other (comments)     \"Crying jag\"        Prior to Admission medications    Medication Sig Start Date End Date Taking? Authorizing Provider   fluticasone (FLONASE) 50 mcg/actuation nasal spray 2 Sprays by Both Nostrils route daily. Yes Historical Provider   Vit A,C,E-Zinc-Copper (PRESERVISION AREDS) cap capsule Take 1 Cap by mouth two (2) times a day. Yes Historical Provider   therapeutic multivitamin (THERAGRAN) tablet Take 1 Tab by mouth daily. Yes Historical Provider   guaiFENesin 200 mg/5 mL liqd Take 5 mL by mouth three (3) times daily. Patient taking differently: Take 10 mL by mouth three (3) times daily. 1/10/17  Yes Carlos Alberto Swain MD   atorvastatin (LIPITOR) 10 mg tablet Take 1 Tab by mouth daily. 1/10/17  Yes Carlos Alberto Swain MD   escitalopram oxalate (LEXAPRO) 10 mg tablet Take 1 Tab by mouth daily.  16  Yes Carlos Alberto Swain MD   Cholecalciferol, Vitamin D3, 1,000 unit cap Take 1 capsule by mouth daily 12/4/15  Yes Zohreh Joe MD   acetaminophen (TYLENOL) 500 mg tablet Take 1,000 mg by mouth every four (4) hours as needed for Pain. Historical Provider   bismuth subsalicylate (BISMATROL) 262 mg/15 mL suspension Take 30 mL by mouth as needed (After each loose stool). Historical Provider   albuterol (PROVENTIL, VENTOLIN) 2 mg/5 mL syrup Take 5 mL by mouth four (4) times daily as needed. Please give pt if she is coughing. If not improving need rx and nebulizer. 1/10/17   Zohreh Joe MD   albuterol (PROVENTIL HFA, VENTOLIN HFA, PROAIR HFA) 90 mcg/actuation inhaler Take 2 puffs by inhalation every four (4) hours as needed for Wheezing or Shortness of Breath. Patient taking differently: Take 1 Puff by inhalation every six (6) hours as needed for Wheezing or Shortness of Breath.  12/8/14   Zohreh Joe MD       Past Medical History   Diagnosis Date    Arthritis      Dr. Angel Persaud [facility])      colon CA in situ    Depression     Hematoma (nontraumatic) of breast      Dr. John Diaz [facility] breast surgeon    High cholesterol     Hypertension     Osteoporosis      Dr. Camie Jeans fall fractured vertebrae    Psychiatric disorder      depression    Umbilical hernia         Past Surgical History   Procedure Laterality Date    Hx hip replacement  2009     Right    Hx colectomy  1985    Hx pelvic fracture tx  2010    Hx orthopaedic  2012     T11 & L1 vertebrae fx    Hx heent       cateracts bilateral       Social History   Substance Use Topics    Smoking status: Former Smoker     Packs/day: 0.50     Quit date: 1/1/1970    Smokeless tobacco: Never Used    Alcohol use Yes      Comment: occasional        Family History   Problem Relation Age of Onset    Heart Disease Mother         Review of Systems:  (bold if positive, if negative)    Gen:  Eyes:  ENT:  CVS:  Pulm:  GI:  Abdominal pain,  :    MS:  Skin:  Psych:  Endo:    Hem:  Renal:    Neuro: Objective:      VITALS:    Vital signs reviewed; most recent are:    Visit Vitals    /75 (BP 1 Location: Left arm, BP Patient Position: At rest)    Pulse 74    Temp 97.7 °F (36.5 °C)    Resp 16    Ht 5' 7\" (1.702 m)    Wt 72.6 kg (160 lb)    SpO2 90%    BMI 25.06 kg/m2     SpO2 Readings from Last 6 Encounters:   02/21/17 90%   01/10/17 94%   10/31/16 94%   07/22/16 99%   07/14/16 96%   06/02/16 98%    O2 Flow Rate (L/min): 2 l/min   No intake or output data in the 24 hours ending 02/21/17 0050     Exam:     Physical Exam:    Gen:  Elderly, frail, pleasant, NAD  HEENT:  Pink conjunctivae, PERRL, hearing intact to voice, dry mucous membranes  Neck:  Supple, without masses, thyroid non-tender  Resp:  No accessory muscle use, clear breath sounds without wheezes rales or rhonchi  Card:  No murmurs, normal S1, S2 without thrills, bruits or peripheral edema  Abd:  Soft, slight periumbilical tenderness, non-distended, normoactive bowel sounds are present, no palpable organomegaly and no detectable hernias  Lymph:  No cervical adenopathy  Musc:  No cyanosis or clubbing  Skin:  No rashes   Neuro:  Cranial nerves 3-12 are grossly intact,  strength is 5/5 bilaterally, dorsi / plantarflexion strength is 5/5 bilaterally, follows commands appropriately  Psych:  Alert with fair insight. Oriented to person, place, and time     Labs:    Recent Labs      02/20/17   1630   WBC  15.6*   HGB  13.3   HCT  39.9   PLT  274     Recent Labs      02/20/17   1630   NA  141   K  4.3   CL  105   CO2  28   GLU  133*   BUN  18   CREA  0.96   CA  9.0   ALB  3.7   TBILI  0.3   SGOT  18   ALT  28     No results found for: GLUCPOC    No results for input(s): INR in the last 72 hours. No lab exists for component: INREXT    **Old Records reviewed in [facility]**       Assessment/Plan:       Principal Problem:    81 yo hx of HTN, depression, presented w/ a rectus sheath hematoma s/p GLF.   Medicine was consulted for medical management    1) Rectus sheath hematoma: seen on CT scan. Likely from [facility]. No blood thinners. Management per Gen surg. Monitor CBC closely for blood loss anemia    2) T1 compression fx: due to fall, osteoporosis. Hx of lumbar compression fracture. Management per Ortho    3) HTN: not on meds. Will monitor    4) Hyperlipidemia: cont statin    5) Depression: cont lexapro    6) Leukocytosis: likely reactive. Chest CT neg for pneumonia.   Will check U/A    Total time spent with patient: 48 895 [facility] discussed with: Patient, nursing    Discussed:  Care Plan    Prophylaxis:  SCD's           ___________________________________________________    Attending Physician: Vasile Coyle MD

## 2017-02-21 NOTE — DISCHARGE INSTRUCTIONS
Hematoma: Care Instructions  Your Care Instructions  A hematoma is a bad bruise. It happens when an injury causes blood to collect and pool under the skin. The pooling blood gives the skin a spongy, rubbery, lumpy feel. A hematoma usually is not a cause for concern. It is not the same thing as a blood clot in a vein, and it does not cause blood clots. Follow-up care is a key part of your treatment and safety. Be sure to make and go to all appointments, and call your doctor if you are having problems. It's also a good idea to know your test results and keep a list of the medicines you take. How can you care for yourself at home? · Rest and protect the bruised area. · Put ice or a cold pack on the area for 10 to 20 minutes at a time. · Prop up the bruised area on a pillow when you ice it or anytime you sit or lie down during the next 3 days. Try to keep it above the level of your heart. This will help reduce swelling. · Wrapping the bruised area with an elastic bandage such as an Ace wrap will help decrease swelling. Don't wrap it too tightly, as this can cause more swelling below the affected area. · Take an over-the-counter pain medicine, such as acetaminophen (Tylenol), ibuprofen (Advil, Motrin), or naproxen (Aleve). · Do not take two or more pain medicines at the same time unless the doctor told you to. Many pain medicines have acetaminophen, which is Tylenol. Too much acetaminophen (Tylenol) can be harmful. When should you call for help? Call your doctor now or seek immediate medical care if:  · You have signs of skin infection, such as:  ¨ Increased pain, swelling, warmth, or redness. ¨ Red streaks leading from the area. ¨ Pus draining from the area. ¨ A fever. Watch closely for changes in your health, and be sure to contact your doctor if:  · The bruise lasts longer than 4 weeks. · The bruise gets bigger or becomes more painful. · You do not get better as expected.   Where can you learn more?  Go to http://suzanne-abdi.info/. Enter P911 in the search box to learn more about \"Hematoma: Care Instructions. \"  Current as of: May 27, 2016  Content Version: 11.1  © 4666-2546 [employer]. Care instructions adapted under license by [employer] (which disclaims liability or warranty for this information). If you have questions about a medical condition or this instruction, always ask your healthcare professional. [place] [address] any warranty or liability for your use of this information.       Follow up with you PCP in the next 2 weeks    Follow up with Dr Luis Whitaker as needed    Alanna Chaves MD, PhD, [facility] at [address] [place] [address] [place] [address]  [phone]  Fax [phone]

## 2017-02-21 NOTE — H&P
Consult    Subjective:     Chao Russ is a 80 y.o.  female who is being seen for a rectus sheath hematoma after a GLF. Patient has no LOC. She denies nausea. Her pain is controlled.       Past Medical History   Diagnosis Date    Arthritis      Dr. Ortiz Book [facility])      colon CA in situ    Depression     Hematoma (nontraumatic) of breast      Dr. John Diaz [facility] surgeon    High cholesterol     Hypertension     Osteoporosis      Dr. Camie Jeans fall fractured vertebrae    Psychiatric disorder      depression    Umbilical hernia       Past Surgical History   Procedure Laterality Date    Hx hip replacement  2009     Right    Hx colectomy  1985    Hx pelvic fracture tx  2010    Hx orthopaedic  2012     T11 & L1 vertebrae fx    Hx heent       cateracts bilateral     Family History   Problem Relation Age of Onset    Heart Disease Mother       Social History   Substance Use Topics    Smoking status: Former Smoker     Packs/day: 0.50     Quit date: 1/1/1970    Smokeless tobacco: Never Used    Alcohol use Yes      Comment: occasional       Current Facility-Administered Medications   Medication Dose Route Frequency Provider Last Rate Last Dose    albuterol (PROVENTIL VENTOLIN) nebulizer solution 2.5 mg  2.5 mg Nebulization Q4H PRN Harish FOY Do, MD        dextrose 5 % - 0.45% NaCl infusion  50 mL/hr IntraVENous CONTINUOUS Ken Gee MD 50 mL/hr at 02/21/17 0951 50 mL/hr at 02/21/17 0951    atorvastatin (LIPITOR) tablet 10 mg  10 mg Oral DAILY Libby Iniguez MD   10 mg at 02/21/17 0950    bismuth subsalicylate (PEPTO-BISMOL) oral suspension 30 mL  30 mL Oral PRN Libby Iniguez MD        escitalopram oxalate (LEXAPRO) tablet 10 mg  10 mg Oral DAILY Libby Iniguez MD   10 mg at 02/21/17 0950    fluticasone (FLONASE) 50 mcg/actuation nasal spray 2 Spray  2 Spray Both Nostrils DAILY Libby Iniguez MD   2 [place] at 02/21/17 0951    sodium chloride (NS) flush 5-10 mL  5-10 mL IntraVENous Q8H Everett Levin MD   10 mL at 02/21/17 0022    sodium chloride (NS) flush 5-10 mL  5-10 mL IntraVENous PRN Everett Levin MD        HYDROcodone-acetaminophen [facility]) 5-325 mg per tablet 1 Tab  1 Tab Oral Q4H PRN Everett Levin MD        morphine injection 2 mg  2 mg IntraVENous Q4H PRN Everett Levin MD        naloxone [facility]) injection 0.4 mg  0.4 mg IntraVENous PRN Everett Levin MD        ondansetron [facility]) injection 4 mg  4 mg IntraVENous Q4H PRN Everett Levin MD        diphenhydrAMINE (BENADRYL) injection 12.5 mg  12.5 mg IntraVENous Q4H PRN Everett Levin MD        LORazepam (ATIVAN) injection 1 mg  1 mg IntraVENous Q6H PRN Everett Levin MD        docusate sodium (COLACE) capsule 100 mg  100 mg Oral BID Everett Levin MD   100 mg at 02/21/17 9466    guaiFENesin (ROBITUSSIN) 100 mg/5 mL oral liquid 200 mg  200 mg Oral TID Everett Levin MD   200 mg at 02/21/17 1687        Allergies   Allergen Reactions    Latex Unknown (comments)     Pt unable to report      Codeine Unknown (comments)    Sulfa Dyne Other (comments)     \"Crying jag\"        Review of Systems:  A comprehensive review of systems was negative except for that written in the History of Present Illness. Objective:       Physical Exam:   Visit Vitals    /52    Pulse 68    Temp 98.2 °F (36.8 °C)    Resp 18    Ht 5' 7\" (1.702 m)    Wt 160 lb (72.6 kg)    SpO2 95%    BMI 25.06 kg/m2     General appearance: alert, cooperative, no distress, appears stated age  Head: 4 cm hematoma on the back of her head   Eyes: conjunctivae/corneas clear. PERRL, EOM's intact. Fundi benign  Neck: supple, symmetrical, trachea midline, no adenopathy, thyroid: not enlarged, symmetric, no tenderness/mass/nodules,  Lungs: clear to auscultation bilaterally  Heart: regular rate and rhythm, S1, S2 normal, no murmur, click, rub or gallop  Abdomen: soft, tender on the lower right abdomen.  No ecchymosis of the abdominal wall Bowel sounds normal,  no organomegaly reducible umbilical hernia    Data Review:   Recent Results (from the past 24 hour(s))   CBC WITH AUTOMATED DIFF    Collection Time: 02/20/17  4:30 PM   Result Value Ref Range    WBC 15.6 (H) 3.6 - 11.0 K/uL    RBC 4.40 3.80 - 5.20 M/uL    HGB 13.3 11.5 - 16.0 g/dL    HCT 39.9 35.0 - 47.0 %    MCV 90.7 80.0 - 99.0 FL    MCH 30.2 26.0 - 34.0 PG    MCHC 33.3 30.0 - 36.5 g/dL    RDW 14.1 11.5 - 14.5 %    PLATELET 111 778 - 178 K/uL    NEUTROPHILS 78 (H) 32 - 75 %    LYMPHOCYTES 14 12 - 49 %    MONOCYTES 7 5 - 13 %    EOSINOPHILS 1 0 - 7 %    BASOPHILS 0 0 - 1 %    ABS. NEUTROPHILS 12.3 (H) 1.8 - 8.0 K/UL    ABS. LYMPHOCYTES 2.1 0.8 - 3.5 K/UL    ABS. MONOCYTES 1.1 (H) 0.0 - 1.0 K/UL    ABS. EOSINOPHILS 0.1 0.0 - 0.4 K/UL    ABS. BASOPHILS 0.0 0.0 - 0.1 K/UL   METABOLIC PANEL, COMPREHENSIVE    Collection Time: 02/20/17  4:30 PM   Result Value Ref Range    Sodium 141 136 - 145 mmol/L    Potassium 4.3 3.5 - 5.1 mmol/L    Chloride 105 97 - 108 mmol/L    CO2 28 21 - 32 mmol/L    Anion gap 8 5 - 15 mmol/L    Glucose 133 (H) 65 - 100 mg/dL    BUN 18 6 - 20 MG/DL    Creatinine 0.96 0.55 - 1.02 MG/DL    BUN/Creatinine ratio 19 12 - 20      GFR est AA >60 >60 ml/min/1.73m2    GFR est non-AA 54 (L) >60 ml/min/1.73m2    Calcium 9.0 8.5 - 10.1 MG/DL    Bilirubin, total 0.3 0.2 - 1.0 MG/DL    ALT (SGPT) 28 12 - 78 U/L    AST (SGOT) 18 15 - 37 U/L    Alk.  phosphatase 75 45 - 117 U/L    Protein, total 7.0 6.4 - 8.2 g/dL    Albumin 3.7 3.5 - 5.0 g/dL    Globulin 3.3 2.0 - 4.0 g/dL    A-G Ratio 1.1 1.1 - 2.2     METABOLIC PANEL, BASIC    Collection Time: 02/21/17  5:29 AM   Result Value Ref Range    Sodium 140 136 - 145 mmol/L    Potassium 4.5 3.5 - 5.1 mmol/L    Chloride 106 97 - 108 mmol/L    CO2 29 21 - 32 mmol/L    Anion gap 5 5 - 15 mmol/L    Glucose 141 (H) 65 - 100 mg/dL    BUN 17 6 - 20 MG/DL    Creatinine 0.76 0.55 - 1.02 MG/DL    BUN/Creatinine ratio 22 (H) 12 - 20      GFR est AA >60 >60 ml/min/1.73m2    GFR est non-AA >60 >60 ml/min/1.73m2    Calcium 8.1 (L) 8.5 - 10.1 MG/DL   CBC W/O DIFF    Collection Time: 02/21/17  5:29 AM   Result Value Ref Range    WBC 8.2 3.6 - 11.0 K/uL    RBC 3.61 (L) 3.80 - 5.20 M/uL    HGB 10.3 (L) 11.5 - 16.0 g/dL    HCT 34.1 (L) 35.0 - 47.0 %    MCV 94.5 80.0 - 99.0 FL    MCH 28.5 26.0 - 34.0 PG    MCHC 30.2 30.0 - 36.5 g/dL    RDW 14.5 11.5 - 14.5 %    PLATELET 315 518 - 030 K/uL   MAGNESIUM    Collection Time: 02/21/17  5:29 AM   Result Value Ref Range    Magnesium 2.1 1.6 - 2.4 mg/dL   PHOSPHORUS    Collection Time: 02/21/17  5:29 AM   Result Value Ref Range    Phosphorus 3.8 2.6 - 4.7 MG/DL     CT -  Rectus sheath hematoma without active bleeding    Assessment:     Principal Problem:    Rectus sheath hematoma (2/20/2017)    Active Problems:    Psychiatric disorder ()      Overview: depression      High cholesterol ()      Hypertension ()      Depression ()      Thoracic compression fracture (HCC) (2/21/2017)      Scalp hematoma - NTD    Rectus Sheath hematoma -  Stable.   Will check Hgb in the am, if stable discharge     Thoracic compression fracture - NTD per ortho          Plan:       General diet  PT/OT  AM labs   D/C planning for the AM    Signed By: Monica Lew MD     February 21, 2017

## 2017-02-21 NOTE — PROGRESS NOTES
Rounded on [facility]ai patients and provided Anointing of the Sick at request of patient    FrGeorgi Lang.

## 2017-02-21 NOTE — PROGRESS NOTES
[facility]  [address]  [phone]      Medical Progress Note      NAME: Reid Francis   :  1924  MRM:  [id number]    Date/Time: 2017          Subjective:     Chief Complaint:  F/u rectus sheath hematoma    Chart/notes/labs/studies reviewed, patient examined at bedside. Reports some pain in abdomen. No CP or SOB. No f/c          Objective:       Vitals:          Last 24hrs VS reviewed since prior progress note. Most recent are:    Visit Vitals    /59 (BP 1 Location: Right arm, BP Patient Position: At rest)    Pulse 65    Temp 98.3 °F (36.8 °C)    Resp 18    Ht 5' 7\" (1.702 m)    Wt 72.6 kg (160 lb)    SpO2 94%    BMI 25.06 kg/m2     SpO2 Readings from Last 6 Encounters:   17 94%   01/10/17 94%   10/31/16 94%   16 99%   16 96%   16 98%    O2 Flow Rate (L/min): 2 l/min   No intake or output data in the 24 hours ending 17 1722       Exam:     Physical Exam:    Gen:  Well-developed, well-nourished, in no acute distress  HEENT:  Sclerae nonicteric, hearing intact to voice, mucous membranes moist  Neck:  Supple, without masses. Resp:  No accessory muscle use, CTAB without wheezes, rales, or rhonchi  Card: RRR, without m/r/g. No LE edema. Abd:  +bowel sounds, soft, mild periumbilical TTP. Neuro:  Face symmetric, tongue midline, speech fluent, follows commands appropriately  Psych:  Alert, oriented to self and hospital. Fair insight     Medications Reviewed: (see below)    Lab Data Reviewed: (see below)    ______________________________________________________________________    Medications:     Current Facility-Administered Medications   Medication Dose Route Frequency    albuterol (PROVENTIL VENTOLIN) nebulizer solution 2.5 mg  2.5 mg Nebulization Q4H PRN    dextrose 5 % - 0.45% NaCl infusion  50 mL/hr IntraVENous CONTINUOUS    atorvastatin (LIPITOR) tablet 10 mg  10 mg Oral DAILY    bismuth subsalicylate (PEPTO-BISMOL) oral suspension 30 mL  30 mL Oral PRN    escitalopram oxalate (LEXAPRO) tablet 10 mg  10 mg Oral DAILY    fluticasone (FLONASE) 50 mcg/actuation nasal spray 2 Spray  2 Spray Both Nostrils DAILY    sodium chloride (NS) flush 5-10 mL  5-10 mL IntraVENous Q8H    sodium chloride (NS) flush 5-10 mL  5-10 mL IntraVENous PRN    HYDROcodone-acetaminophen (NORCO) 5-325 mg per tablet 1 Tab  1 Tab Oral Q4H PRN    morphine injection 2 mg  2 mg IntraVENous Q4H PRN    naloxone (NARCAN) injection 0.4 mg  0.4 mg IntraVENous PRN    ondansetron (ZOFRAN) injection 4 mg  4 mg IntraVENous Q4H PRN    diphenhydrAMINE (BENADRYL) injection 12.5 mg  12.5 mg IntraVENous Q4H PRN    LORazepam (ATIVAN) injection 1 mg  1 mg IntraVENous Q6H PRN    docusate sodium (COLACE) capsule 100 mg  100 mg Oral BID    guaiFENesin (ROBITUSSIN) 100 mg/5 mL oral liquid 200 mg  200 mg Oral TID            Lab Review:     Recent Labs      02/21/17   0529  02/20/17   1630   WBC  8.2  15.6*   HGB  10.3*  13.3   HCT  34.1*  39.9   PLT  228  274     Recent Labs      02/21/17   0529  02/20/17   1630   NA  140  141   K  4.5  4.3   CL  106  105   CO2  29  28   GLU  141*  133*   BUN  17  18   CREA  0.76  0.96   CA  8.1*  9.0   MG  2.1   --    PHOS  3.8   --    ALB   --   3.7   SGOT   --   18   ALT   --   28     No components found for: GLPOC  No results for input(s): PH, PCO2, PO2, HCO3, FIO2 in the last 72 hours. No results for input(s): INR in the last 72 hours. No lab exists for component: INREXT  No results found for: SDES  No results found for: CULT         Assessment / Plan:     81 yo WF w/ hx of HTN, depression, presented w/ GLF, found to have compression fx and rectus sheath hematoma     Rectus sheath hematoma: traumatic from GLF. Not on blood thinners. Management per Gen surg. Monitoring Hg. With e/o acute blood loss anemia as Hg dropped 3gm to 10.3     T1 compression fx: due to fall, osteoporosis.  Hx of lumbar compression fracture. Management per Ortho. Pain mgmt     Elevated BP: better. Was likely from pain. Not on meds, monitor     Hyperlipidemia: cont statin     Depression: cont lexapro     Leukocytosis: likely reactive, resolved      Ms. Mary Ann Calloway is medically stable, will sign off. Please call with further questions.       Total time spent with patient: 30 minutes, d/w Dr. Jeannette Womack discussed with: Patient, Nursing Staff and >50% of time spent in counseling and coordination of care    Discussed:  Care Plan    Prophylaxis:  per primary team    Disposition:  per primary team           ___________________________________________________    Attending Physician: Solo Billy MD

## 2017-02-21 NOTE — CONSULTS
FRACTURE CONSULT NOTE    Subjective:     Date of Consultation:  February 21, 2017  Referring Physician:  Jasper Medel, ROSA ISELA    Pushpa Nair is a 80 y.o. female who Ortho is consulted to see for incidental findings of T1 compression fx on CT scan. Pt. Had recent GLF and hit her head. Denies neck pain. Denies weakness in arms or legs. Denies NT. History of T and L spine compression fractures, +kyphoplasty in past with good results. Pt. Known to Dr. Lupillo Martinez.       Patient Active Problem List    Diagnosis Date Noted    Thoracic compression fracture ([address] 02/21/2017    Rectus sheath hematoma 02/20/2017    Advanced care planning/counseling discussion 07/14/2016    Hematoma (nontraumatic) of breast     Osteoporosis     Depression     [facility]     Psychiatric disorder     High cholesterol     Hypertension     Compression fracture of L1 lumbar vertebra ([facility] 08/26/2012    Back pain 08/26/2012    Fall at home 08/26/2012    Arthritis 08/26/2012    Essential hypertension 08/26/2012    Dysthymia 08/26/2012     Family History   Problem Relation Age of Onset    Heart Disease Mother       Social History   Substance Use Topics    Smoking status: Former Smoker     Packs/day: 0.50     Quit date: 1/1/1970    Smokeless tobacco: Never Used    Alcohol use Yes      Comment: occasional     Past Medical History   Diagnosis Date    Arthritis      Dr. Jes Velazco [facility])      colon CA in situ    Depression     Hematoma (nontraumatic) of breast      Dr. Barbara freeman breast surgeon    High cholesterol     Hypertension     Osteoporosis      Dr. Maya Wooten fall fractured vertebrae    Psychiatric disorder      depression    Umbilical hernia       Past Surgical History   Procedure Laterality Date    Hx hip replacement  2009     Right    Hx colectomy  1985    Hx pelvic fracture tx  2010    Hx orthopaedic  2012     T11 & L1 vertebrae fx    Hx heent       cateracts bilateral      Prior to Admission medications    Medication Sig Start Date End Date Taking? Authorizing Provider   fluticasone (FLONASE) 50 mcg/actuation nasal spray 2 Sprays by Both Nostrils route daily. Yes Historical Provider   Vit A,C,E-Zinc-Copper (PRESERVISION AREDS) cap capsule Take 1 Cap by mouth two (2) times a day. Yes Historical Provider   therapeutic multivitamin (THERAGRAN) tablet Take 1 Tab by mouth daily. Yes Historical Provider   guaiFENesin 200 mg/5 mL liqd Take 5 mL by mouth three (3) times daily. Patient taking differently: Take 10 mL by mouth three (3) times daily. 1/10/17  Yes Greg Kaur MD   atorvastatin (LIPITOR) 10 mg tablet Take 1 Tab by mouth daily. 1/10/17  Yes Greg Kaur MD   escitalopram oxalate (LEXAPRO) 10 mg tablet Take 1 Tab by mouth daily. 7/22/16  Yes Greg Kaur MD   Cholecalciferol, Vitamin D3, 1,000 unit cap Take 1 capsule by mouth daily 12/4/15  Yes Greg Kaur MD   acetaminophen (TYLENOL) 500 mg tablet Take 1,000 mg by mouth every four (4) hours as needed for Pain. Historical Provider   bismuth subsalicylate (BISMATROL) 262 mg/15 mL suspension Take 30 mL by mouth as needed (After each loose stool). Historical Provider   albuterol (PROVENTIL, VENTOLIN) 2 mg/5 mL syrup Take 5 mL by mouth four (4) times daily as needed. Please give pt if she is coughing. If not improving need rx and nebulizer. 1/10/17   Greg Kaur MD   albuterol (PROVENTIL HFA, VENTOLIN HFA, PROAIR HFA) 90 mcg/actuation inhaler Take 2 puffs by inhalation every four (4) hours as needed for Wheezing or Shortness of Breath. Patient taking differently: Take 1 Puff by inhalation every six (6) hours as needed for Wheezing or Shortness of Breath.  12/8/14   Greg Kaur MD     Current Facility-Administered Medications   Medication Dose Route Frequency    albuterol (PROVENTIL VENTOLIN) nebulizer solution 2.5 mg  2.5 mg Nebulization Q4H PRN    dextrose 5 % - 0.45% NaCl infusion  50 mL/hr IntraVENous CONTINUOUS    atorvastatin (LIPITOR) tablet 10 mg  10 mg Oral DAILY    bismuth subsalicylate (PEPTO-BISMOL) oral suspension 30 mL  30 mL Oral PRN    escitalopram oxalate (LEXAPRO) tablet 10 mg  10 mg Oral DAILY    fluticasone (FLONASE) 50 mcg/actuation nasal spray 2 Spray  2 Spray Both Nostrils DAILY    sodium chloride (NS) flush 5-10 mL  5-10 mL IntraVENous Q8H    sodium chloride (NS) flush 5-10 mL  5-10 mL IntraVENous PRN    HYDROcodone-acetaminophen (NORCO) 5-325 mg per tablet 1 Tab  1 Tab Oral Q4H PRN    morphine injection 2 mg  2 mg IntraVENous Q4H PRN    naloxone (NARCAN) injection 0.4 mg  0.4 mg IntraVENous PRN    ondansetron (ZOFRAN) injection 4 mg  4 mg IntraVENous Q4H PRN    diphenhydrAMINE (BENADRYL) injection 12.5 mg  12.5 mg IntraVENous Q4H PRN    LORazepam (ATIVAN) injection 1 mg  1 mg IntraVENous Q6H PRN    docusate sodium (COLACE) capsule 100 mg  100 mg Oral BID    guaiFENesin (ROBITUSSIN) 100 mg/5 mL oral liquid 200 mg  200 mg Oral TID      Allergies   Allergen Reactions    Latex Unknown (comments)     Pt unable to report      Codeine Unknown (comments)    Sulfa Dynirasema Other (comments)     \"Crying jag\"        Review of Systems:  A comprehensive review of systems was negative except for that written in the HPI. Mental Status: no dementia    Objective:     Patient Vitals for the past 8 hrs:   BP Temp Pulse Resp SpO2   17 0936 110/52 98.2 °F (36.8 °C) 68 18 95 %   17 0527 133/63 98 °F (36.7 °C) 61 18 97 %     Temp (24hrs), Av.1 °F (36.7 °C), Min:97.7 °F (36.5 °C), Max:98.4 °F (36.9 °C)      EXAM: I examined pt's C and T spine. No Spinous process pain. No paraspinous process pain on exam. Strength 5/5 BUE's, DTR 2+ BUE's, +Dorsi/plantar flexion BLE's. NVI distally BUE's. Cap. Refill <2secs all fingers.        Imaging Review:   EXAM: CT CERVICAL SPINE WITHOUT CONTRAST  Clinical history: C-spine trauma, falling when trended to get up from a walking  anterior hip head on door as she fell no loss of consciousness.        INDICATION: C-spine trauma, NEXUS/CCR positive.     COMPARISON: 10/31/2016     TECHNIQUE: Multislice helical CT of the cervical spine was performed without  intravenous contrast administration. Sagittal and coronal reconstructions were  generated. CT dose reduction was achieved through use of a standardized  protocol tailored for this examination and automatic exposure control for dose  modulation.      CONTRAST: None.     FINDINGS:  There is an interval likely acute compression deformity along the inferior  endplate of T1 there is no significant degree of cortical retropulsion. Facet  articulations within normal limits. The bones are moderately osteopenic. Vertebral body heights are normal. There is  no listhesis. Moderate degenerative disc space narrowing is present at C5-6 and  C6-7 with tiny endplate marginal osteophytes greater in the uncovertebral  regions bilaterally as well as anteriorly. Small anterior endplate marginal  osteophytes are also present at C7-T1 and T1 to. The posterior processes and  facet joints appear intact. No substantial facet arthrosis is shown with  exception of moderate left facet osteoarthrosis at C2-3. No osseous canal or  foraminal stenosis is demonstrated. No prevertebral soft tissue mass or swelling  is shown.      IMPRESSION  IMPRESSION:  Acute/subacute T1 vertebral compression deformity along the inferior endplate. Less than 50% loss of vertebral body height. No significant cortical  retropulsion.     Labs:   Recent Results (from the past 24 hour(s))   CBC WITH AUTOMATED DIFF    Collection Time: 02/20/17  4:30 PM   Result Value Ref Range    WBC 15.6 (H) 3.6 - 11.0 K/uL    RBC 4.40 3.80 - 5.20 M/uL    HGB 13.3 11.5 - 16.0 g/dL    HCT 39.9 35.0 - 47.0 %    MCV 90.7 80.0 - 99.0 FL    MCH 30.2 26.0 - 34.0 PG    MCHC 33.3 30.0 - 36.5 g/dL    RDW 14.1 11.5 - 14.5 %    PLATELET 276 860 - 949 K/uL NEUTROPHILS 78 (H) 32 - 75 %    LYMPHOCYTES 14 12 - 49 %    MONOCYTES 7 5 - 13 %    EOSINOPHILS 1 0 - 7 %    BASOPHILS 0 0 - 1 %    ABS. NEUTROPHILS 12.3 (H) 1.8 - 8.0 K/UL    ABS. LYMPHOCYTES 2.1 0.8 - 3.5 K/UL    ABS. MONOCYTES 1.1 (H) 0.0 - 1.0 K/UL    ABS. EOSINOPHILS 0.1 0.0 - 0.4 K/UL    ABS. BASOPHILS 0.0 0.0 - 0.1 K/UL   METABOLIC PANEL, COMPREHENSIVE    Collection Time: 02/20/17  4:30 PM   Result Value Ref Range    Sodium 141 136 - 145 mmol/L    Potassium 4.3 3.5 - 5.1 mmol/L    Chloride 105 97 - 108 mmol/L    CO2 28 21 - 32 mmol/L    Anion gap 8 5 - 15 mmol/L    Glucose 133 (H) 65 - 100 mg/dL    BUN 18 6 - 20 MG/DL    Creatinine 0.96 0.55 - 1.02 MG/DL    BUN/Creatinine ratio 19 12 - 20      GFR est AA >60 >60 ml/min/1.73m2    GFR est non-AA 54 (L) >60 ml/min/1.73m2    Calcium 9.0 8.5 - 10.1 MG/DL    Bilirubin, total 0.3 0.2 - 1.0 MG/DL    ALT (SGPT) 28 12 - 78 U/L    AST (SGOT) 18 15 - 37 U/L    Alk.  phosphatase 75 45 - 117 U/L    Protein, total 7.0 6.4 - 8.2 g/dL    Albumin 3.7 3.5 - 5.0 g/dL    Globulin 3.3 2.0 - 4.0 g/dL    A-G Ratio 1.1 1.1 - 2.2     METABOLIC PANEL, BASIC    Collection Time: 02/21/17  5:29 AM   Result Value Ref Range    Sodium 140 136 - 145 mmol/L    Potassium 4.5 3.5 - 5.1 mmol/L    Chloride 106 97 - 108 mmol/L    CO2 29 21 - 32 mmol/L    Anion gap 5 5 - 15 mmol/L    Glucose 141 (H) 65 - 100 mg/dL    BUN 17 6 - 20 MG/DL    Creatinine 0.76 0.55 - 1.02 MG/DL    BUN/Creatinine ratio 22 (H) 12 - 20      GFR est AA >60 >60 ml/min/1.73m2    GFR est non-AA >60 >60 ml/min/1.73m2    Calcium 8.1 (L) 8.5 - 10.1 MG/DL   CBC W/O DIFF    Collection Time: 02/21/17  5:29 AM   Result Value Ref Range    WBC 8.2 3.6 - 11.0 K/uL    RBC 3.61 (L) 3.80 - 5.20 M/uL    HGB 10.3 (L) 11.5 - 16.0 g/dL    HCT 34.1 (L) 35.0 - 47.0 %    MCV 94.5 80.0 - 99.0 FL    MCH 28.5 26.0 - 34.0 PG    MCHC 30.2 30.0 - 36.5 g/dL    RDW 14.5 11.5 - 14.5 %    PLATELET 908 287 - 250 K/uL   MAGNESIUM    Collection Time: 02/21/17  5:29 AM   Result Value Ref Range    Magnesium 2.1 1.6 - 2.4 mg/dL   PHOSPHORUS    Collection Time: 02/21/17  5:29 AM   Result Value Ref Range    Phosphorus 3.8 2.6 - 4.7 MG/DL         Impression:     Principal Problem:    Rectus sheath hematoma (2/20/2017)    Active Problems:    Psychiatric disorder ()      Overview: depression      High cholesterol ()      Hypertension ()      Depression ()      Thoracic compression fracture ([address] (2/21/2017)        Plan:     Acute on chronic T1 compression fx. Pt. Asymptomatic. Recommend follow up with Dr. Donovan Sen as needed. No brace for now. Dr. Carlos Hunter agrees with plan. Thank you for allowing us to take part in this patients care. Ivon Andrade PA-C   Orthopaedic Surgery [address]  [address]  [address] [place] PA-C

## 2017-02-21 NOTE — PROGRESS NOTES
CM Note:  Met with pt (who is hard of hearing) and her daughter for d/c planning. PTA pt was independent with ADL's using a rollator. She relies on her MARGARITA and her daughter for transportation. Additional DME at home: shower chair, BSC, w/c (old). Her daughter would like to get a new w/c for her. Pt had home health in the past with [facility]. She has had rehab at [facility], [address] [place] [address] and [facility]. Her emergency contact is her daughter, Manuela Cantu ([phone]), who will transport her back to assisted living at [address] [facility] [address]. Pt has Rx coverage and gets her medications from [address] [place] [address] S through [facility]. Will continue to follow and assist with any needs prior to d/c. KARLA Powell RN    Care Management Interventions  PCP Verified by CM: Yes (PCP is Dr. Harish Kang.   KOKI Spears notified.)  Transition of Care Consult (CM Consult): Discharge Planning  Discharge Durable Medical Equipment: No  Physical Therapy Consult: Yes  Occupational Therapy Consult: Yes  Speech Therapy Consult: No  Current Support Network: Assisted Living (Pt is a resident at [address] in [place].)  Confirm Follow Up Transport: Family (Pt's daughter to transport her at d/c.)  Plan discussed with Pt/Family/Caregiver: Yes  Discharge Location  Discharge Placement: Assisted Living

## 2017-02-21 NOTE — ED NOTES
TRANSFER - OUT REPORT:    Verbal report given to Ty(name) on [facility]  being transferred to [facility](unit) for routine progression of care       Report consisted of patients Situation, Background, Assessment and   Recommendations(SBAR). Information from the following report(s) SBAR, Kardex, ED Summary, Procedure Summary, Intake/Output, MAR and Recent Results was reviewed with the receiving nurse. Lines:   Peripheral IV 02/20/17 Right Antecubital (Active)   Site Assessment Clean, dry, & intact 2/20/2017  4:35 PM   Phlebitis Assessment 0 2/20/2017  4:35 PM   Infiltration Assessment 0 2/20/2017  4:35 PM   Dressing Status Clean, dry, & intact 2/20/2017  4:35 PM   Dressing Type Transparent 2/20/2017  4:35 PM       Peripheral IV 02/20/17 Right Forearm (Active)   Site Assessment Clean, dry, & intact 2/20/2017  5:48 PM   Phlebitis Assessment 0 2/20/2017  5:48 PM   Infiltration Assessment 0 2/20/2017  5:48 PM   Dressing Status Clean, dry, & intact 2/20/2017  5:48 PM   Dressing Type Transparent 2/20/2017  5:48 PM        Opportunity for questions and clarification was provided.       Patient transported with:   [employer]urch

## 2017-02-21 NOTE — PROGRESS NOTES
Asked to admit patient by the ER as a 'trauma'  Patient had ground level fall earlier today. Has been in  ER since 1:45pm.  Surgery consulted at 8:30pm for admission. No LOC, hemodynamically stable. Only findings are a T1 vertebral body compression fracture that is subacute and a rectus sheath hematoma. CT examined by me personally. There is no active extravasattion on the CTA. Recommendation is observation and hold blood thinners.   Vertebral body fracture per ortho  Medical management per hospitalist

## 2017-02-22 LAB
ERYTHROCYTE [DISTWIDTH] IN BLOOD BY AUTOMATED COUNT: 14.2 % (ref 11.5–14.5)
HCT VFR BLD AUTO: 32.6 % (ref 35–47)
HGB BLD-MCNC: 10.3 G/DL (ref 11.5–16)
MCH RBC QN AUTO: 29.3 PG (ref 26–34)
MCHC RBC AUTO-ENTMCNC: 31.6 G/DL (ref 30–36.5)
MCV RBC AUTO: 92.9 FL (ref 80–99)
PLATELET # BLD AUTO: 229 K/UL (ref 150–400)
RBC # BLD AUTO: 3.51 M/UL (ref 3.8–5.2)
WBC # BLD AUTO: 9.9 K/UL (ref 3.6–11)

## 2017-02-22 PROCEDURE — 97535 SELF CARE MNGMENT TRAINING: CPT

## 2017-02-22 PROCEDURE — 94640 AIRWAY INHALATION TREATMENT: CPT

## 2017-02-22 PROCEDURE — 74011000250 HC RX REV CODE- 250: Performed by: INTERNAL MEDICINE

## 2017-02-22 PROCEDURE — 97165 OT EVAL LOW COMPLEX 30 MIN: CPT

## 2017-02-22 PROCEDURE — 97163 PT EVAL HIGH COMPLEX 45 MIN: CPT

## 2017-02-22 PROCEDURE — 74011250636 HC RX REV CODE- 250/636: Performed by: SURGERY

## 2017-02-22 PROCEDURE — 85027 COMPLETE CBC AUTOMATED: CPT | Performed by: SURGERY

## 2017-02-22 PROCEDURE — 65270000029 HC RM PRIVATE

## 2017-02-22 PROCEDURE — 74011250637 HC RX REV CODE- 250/637: Performed by: SURGERY

## 2017-02-22 PROCEDURE — 36415 COLL VENOUS BLD VENIPUNCTURE: CPT | Performed by: SURGERY

## 2017-02-22 RX ADMIN — GUAIFENESIN 200 MG: 100 SOLUTION ORAL at 17:11

## 2017-02-22 RX ADMIN — ALBUTEROL SULFATE 2.5 MG: 2.5 SOLUTION RESPIRATORY (INHALATION) at 01:00

## 2017-02-22 RX ADMIN — ALBUTEROL SULFATE 2.5 MG: 2.5 SOLUTION RESPIRATORY (INHALATION) at 22:08

## 2017-02-22 RX ADMIN — HYDROCODONE BITARTRATE AND ACETAMINOPHEN 1 TABLET: 5; 325 TABLET ORAL at 21:59

## 2017-02-22 RX ADMIN — ATORVASTATIN CALCIUM 10 MG: 10 TABLET, FILM COATED ORAL at 09:57

## 2017-02-22 RX ADMIN — HYDROCODONE BITARTRATE AND ACETAMINOPHEN 1 TABLET: 5; 325 TABLET ORAL at 03:00

## 2017-02-22 RX ADMIN — GUAIFENESIN 200 MG: 100 SOLUTION ORAL at 09:57

## 2017-02-22 RX ADMIN — DOCUSATE SODIUM 100 MG: 100 CAPSULE ORAL at 17:11

## 2017-02-22 RX ADMIN — Medication 10 ML: at 05:15

## 2017-02-22 RX ADMIN — DOCUSATE SODIUM 100 MG: 100 CAPSULE ORAL at 09:57

## 2017-02-22 RX ADMIN — GUAIFENESIN 200 MG: 100 SOLUTION ORAL at 21:58

## 2017-02-22 RX ADMIN — LORAZEPAM 1 MG: 2 INJECTION INTRAMUSCULAR; INTRAVENOUS at 17:12

## 2017-02-22 RX ADMIN — FLUTICASONE PROPIONATE 2 SPRAY: 50 SPRAY, METERED NASAL at 09:57

## 2017-02-22 RX ADMIN — ESCITALOPRAM OXALATE 10 MG: 10 TABLET ORAL at 09:57

## 2017-02-22 NOTE — PROGRESS NOTES
Problem: Self Care Deficits Care Plan (Adult)  Goal: *Acute Goals and Plan of Care (Insert Text)  Occupational Therapy Goals  Initiated 2/22/2017  1. Patient will perform lower body dressing with minimal assistance/contact guard assist within 7 day(s). 2. Patient will perform upper body dressing with supervision/set-up within 7 day(s). 3. Patient will perform toilet transfers with moderate assist within 7 day(s). 4. Patient will perform all aspects of toileting with moderate assistance within 7 day(s). 5. Patient will utilize energy conservation techniques during functional activities with verbal cues within 7 day(s). OCCUPATIONAL THERAPY EVALUATION  Patient: Madiha Lawson (37 y.o. female)  Date: 2/22/2017  Primary Diagnosis: Rectus sheath hematoma        Precautions: Bed Alarm, Fall (chair alarm)      ASSESSMENT :  Based on the objective data described below, the patient presents to hospital with incresed confusion, T1 compression fracture and rectus hematoma. Patient requires maximum assistance to minimum assistance for basic ADLs. ADLs are limited by decreased activity tolerance, confusion, difficulty following direction, decreased endurance, strength, and balance. Patient daughter present for eval and reports patient with multiple falls at home, lives in assisted living facility and can call for assist if needed. Per report patient was able to perform basic ADLs without physical assist and uses rollator for ambulation. Patient will benefit from continued OT services to increase safety and independence with ADL routine. Recommend discharge to SNF. Patient will benefit from skilled intervention to address the above impairments.   Patients rehabilitation potential is considered to be Fair  Factors which may influence rehabilitation potential include:   [ ]             None noted  [X]             Mental ability/status  [ ]             Medical condition  [ ]             Home/family situation and support systems  [ ]             Safety awareness  [ ]             Pain tolerance/management  [ ]             Other:        PLAN :  Recommendations and Planned Interventions:  [X]               Self Care Training                  [X]        Therapeutic Activities  [X]               Functional Mobility Training    [ ]        Cognitive Retraining  [X]               Therapeutic Exercises           [X]        Endurance Activities  [X]               Balance Training                   [ ]        Neuromuscular Re-Education  [ ]               Visual/Perceptual Training     [X]   Home Safety Training  [X]               Patient Education                 [X]        Family Training/Education  [ ]               Other (comment):     Frequency/Duration: Patient will be followed by occupational therapy 3 times a week to address goals. Discharge Recommendations: Skilled Nursing Facility  Further Equipment Recommendations for Discharge: none noted        SUBJECTIVE:   Patient stated I really have to go to the bathroom. I don't want to wet the floor.       OBJECTIVE DATA SUMMARY:   HISTORY:   Past Medical History:   Diagnosis Date    Arthritis       Dr. [place] [facility])       colon CA in situ    Depression      Hematoma (nontraumatic) of breast       Dr. Chad freeman breast surgeon    High cholesterol      Hypertension      Osteoporosis       Dr. Farrell Goldmann fall fractured vertebrae    Psychiatric disorder       depression    Umbilical hernia       Past Surgical History:   Procedure Laterality Date    HX COLECTOMY   1985    HX HEENT         cateracts bilateral    HX HIP REPLACEMENT   2009     Right    HX ORTHOPAEDIC   2012     T11 & L1 vertebrae fx    HX PELVIC FRACTURE TX   2010        Prior Level of Function/Home Situation: patient living in retirement, completes ADLs without assist  Expanded or extensive additional review of patient history:         [X]  Right hand dominant             [ ]  Left hand dominant EXAMINATION OF PERFORMANCE DEFICITS:  Cognitive/Behavioral Status:  Neurologic State: Alert;Confused  Orientation Level: Disoriented X4  Cognition: Decreased attention/concentration;Decreased command following; Impaired decision making;Poor safety awareness  Perception: Appears intact  Perseveration: No perseveration noted  Safety/Judgement: Decreased awareness of environment;Decreased awareness of need for assistance;Decreased awareness of need for safety;Decreased insight into deficits; Lack of insight into deficits  Skin: intact as seen  Edema: none noted   Vision/Perceptual:                Range of Motion:  AROM: Grossly decreased, non-functional  PROM: Generally decreased, functional        Strength:  Strength: Grossly decreased, non-functional   Coordination:  Coordination: Grossly decreased, non-functional          Tone & Sensation:  Tone: Normal  Sensation: Intact           Balance:  Sitting: Impaired; With support  Sitting - Static: Fair (occasional)  Sitting - Dynamic: Poor (constant support)  Standing: Impaired; With support  Standing - Static: Constant support;Occassional  Standing - Dynamic : Poor     Functional Mobility and Transfers for ADLs:  Bed Mobility:  Rolling: Maximum assistance; Additional time;Assist x2  Supine to Sit: Maximum assistance; Additional time;Assist x2  Sit to Supine: Maximum assistance; Additional time;Assist x2  Scooting: Maximum assistance; Additional time;Assist x2     Transfers:  Sit to Stand: Maximum assistance;Assist x2  Stand to Sit: Maximum assistance; Additional time;Assist x2 (poor control to sit)  Toilet Transfer : Moderate assistance;Assist x2; Additional time     ADL Assessment:  Feeding: Supervision     Oral Facial Hygiene/Grooming: Minimum assistance (seated )     Bathing: Maximum assistance     Upper Body Dressing: Minimum assistance     Lower Body Dressing: Maximum assistance (donning brief)     Toileting: Maximum assistance            ADL Intervention and task modifications:           Cognitive Retraining  Safety/Judgement: Decreased awareness of environment;Decreased awareness of need for assistance;Decreased awareness of need for safety;Decreased insight into deficits; Lack of insight into deficits        Functional Measure:  Barthel Index:      Bathin  Bladder: 5  Bowels: 10  Groomin  Dressin  Feedin  Mobility: 5  Stairs: 0  Toilet Use: 0  Transfer (Bed to Chair and Back): 5  Total: 35         Barthel and G-code impairment scale:  Percentage of impairment CH  0% CI  1-19% CJ  20-39% CK  40-59% CL  60-79% CM  80-99% CN  100%   Barthel Score 0-100 100 99-80 79-60 59-40 20-39 1-19    0   Barthel Score 0-20 20 17-19 13-16 9-12 5-8 1-4 0      The Barthel ADL Index: Guidelines  1. The index should be used as a record of what a patient does, not as a record of what a patient could do. 2. The main aim is to establish degree of independence from any help, physical or verbal, however minor and for whatever reason. 3. The need for supervision renders the patient not independent. 4. A patient's performance should be established using the best available evidence. Asking the patient, friends/relatives and nurses are the usual sources, but direct observation and common sense are also important. However direct testing is not needed. 5. Usually the patient's performance over the preceding 24-48 hours is important, but occasionally longer periods will be relevant. 6. Middle categories imply that the patient supplies over 50 per cent of the effort. 7. Use of aids to be independent is allowed. Yara Alvarenga., Barthel, D.W. ([id number]). Functional evaluation: the Barthel Index. [facility] (14)2. LIAN Hernandez, Nav Mcdonnell., Nohemy Robison., Ned, [facility] (). Measuring the change indisability after inpatient rehabilitation; comparison of the responsiveness of the Barthel Index and Functional [place] Measure.  Journal of Neurology, Neurosurgery, and Psychiatry, 66(4), 626-403. INDU Alejandra, ERIK Hargrove, & Damian Smith M.A. (2004.) Assessment of post-stroke quality of life in cost-effectiveness studies: The usefulness of the Barthel Index and the EuroQoL-5D. Quality of Life Research, 13, 166-42            G codes: In compliance with CMSs Claims Based Outcome Reporting, the following G-code set was chosen for this patient based on their primary functional limitation being treated: The outcome measure chosen to determine the severity of the functional limitation was the Barthel Index with a score of 35/100 which was correlated with the impairment scale. · Self Care:               - CURRENT STATUS:    CJ - 20%-39% impaired, limited or restricted               - GOAL STATUS:           CK - 40%-59% impaired, limited or restricted               - D/C STATUS:                       ---------------To be determined---------------      Occupational Therapy Evaluation Charge Determination   History Examination Decision-Making   LOW Complexity : Brief history review  LOW Complexity : 1-3 performance deficits relating to physical, cognitive , or psychosocial skils that result in activity limitations and / or participation restrictions  LOW Complexity : No comorbidities that affect functional and no verbal or physical assistance needed to complete eval tasks       Based on the above components, the patient evaluation is determined to be of the following complexity level: LOW   Pain:  Pain Scale 1: Numeric (0 - 10)  Pain Intensity 1: 0              Activity Tolerance:   VSS  Please refer to the flowsheet for vital signs taken during this treatment.   After treatment:   [X] Patient left in no apparent distress sitting up in chair  [ ] Patient left in no apparent distress in bed  [X] Call bell left within reach  [X] Nursing notified  [X] Caregiver present  [ ] Bed alarm activated      COMMUNICATION/EDUCATION:   The patients plan of care was discussed with: Physical Therapist and Registered Nurse.  [X] Home safety education was provided and the patient/caregiver indicated understanding. [X] Patient/family have participated as able in goal setting and plan of care. [X] Patient/family agree to work toward stated goals and plan of care. [ ] Patient understands intent and goals of therapy, but is neutral about his/her participation. [ ] Patient is unable to participate in goal setting and plan of care. This patients plan of care is appropriate for delegation to [facility] [place].      Thank you for this referral.  Oliver Burgos, OTR/L  Time Calculation: 34 mins

## 2017-02-22 NOTE — PROGRESS NOTES
Bedside and Verbal shift change report given to Brittany Jackson RN (oncoming nurse) by Ralph Santana RN (offgoing nurse). Report included the following information SBAR, Kardex, Intake/Output, MAR, Accordion and Recent Results.

## 2017-02-22 NOTE — PROGRESS NOTES
Bedside and Verbal shift change report given to Quirino Cadet (oncoming nurse) by Puneet Bullock (offgoing nurse). Report included the following information SBAR, Kardex, Intake/Output, MAR and Recent Results.

## 2017-02-22 NOTE — PROGRESS NOTES
Problem: Mobility Impaired (Adult and Pediatric)  Goal: *Acute Goals and Plan of Care (Insert Text)  Physical Therapy Goals  Initiated 2/22/2017  1. Patient will move from supine to sit and sit to supine , scoot up and down and roll side to side in bed with moderate assistance x 1 within 7 day(s). 2. Patient will transfer from bed to chair and chair to bed with moderate assistance x 1 using the least restrictive device within 7 day(s). 3. Patient will perform sit <> stand with moderate assistance within 7 day(s). 4. Patient will ambulate with moderate assistance x 1 for 50 feet with the least restrictive device within 7 day(s). PHYSICAL THERAPY EVALUATION  Patient: Chato Mary (61 y.o. female)  Date: 2/22/2017  Primary Diagnosis: Rectus sheath hematoma        Precautions:   Bed Alarm, Fall (chair alarm)      ASSESSMENT :  Based on the objective data described below, the patient presents with decreased insight, safety awareness, and confusion, severely decreased endurance, strength,ROM, poor sitting and balance. Patient requesting urgent need for bathroom and states she fear she will be incontinent. She wears depends at home. Patient came to ED 2 days ago with increasing weakness and confusion. Found to have rectus hematoma and T 1 compression fracture. Daughter present and reports patient lives in assisted living at [address] [facility] [address] and has life alert system. Daughter reports patient has history of frequent falls. Patient uses rollator and is extremely hard of hearing. Patient should benefit from PT to increase endurance and functional mobility. Patient requires 24/7 supervision and assistance at this time, recommend SNF at discharge. Return to assisted living may be unrealistic for this 79 yo patient with dementia. Patient will benefit from skilled intervention to address the above impairments.   Patients rehabilitation potential is considered to be Guarded  Factors which may influence rehabilitation potential include:   [ ]         None noted  [X]         Mental ability/status  [X]         Medical condition  [X]         Home/family situation and support systems  [ ]         Safety awareness  [ ]         Pain tolerance/management  [ ]         Other:        PLAN :  Recommendations and Planned Interventions:  [X]           Bed Mobility Training             [ ]    Neuromuscular Re-Education  [X]           Transfer Training                   [ ]    Orthotic/Prosthetic Training  [X]           Gait Training                         [ ]    Modalities  [X]           Therapeutic Exercises           [ ]    Edema Management/Control  [ ]           Therapeutic Activities            [ ]    Patient and Family Training/Education  [ ]           Other (comment):     Frequency/Duration: Patient will be followed by physical therapy  5 times a week to address goals. Discharge Recommendations: Skilled Nursing Facility  Further Equipment Recommendations for Discharge: None       SUBJECTIVE:   Patient stated I have to go to bathroom badly.       OBJECTIVE DATA SUMMARY:   HISTORY:    Past Medical History:   Diagnosis Date    Arthritis       Dr. Wilcox [facility])       colon CA in situ    Depression      Hematoma (nontraumatic) of breast       Dr. Seymour freeman breast surgeon    High cholesterol      Hypertension      Osteoporosis       Dr. Sangita Odonnell fall fractured vertebrae    Psychiatric disorder       depression    Umbilical hernia       Past Surgical History:   Procedure Laterality Date    HX COLECTOMY   1985    HX HEENT         cateracts bilateral    HX HIP REPLACEMENT   2009     Right    HX ORTHOPAEDIC   2012     T11 & L1 vertebrae fx    HX PELVIC FRACTURE TX   2010     Prior Level of Function/Home Situation: h/o frequent falls  Personal factors and/or comorbidities impacting plan of care: resides at assisted living, dementia, disoriented x 4 at this time, previous fractures EXAMINATION/PRESENTATION/DECISION MAKING:   Critical Behavior:  Neurologic State: Alert, Confused  Orientation Level: Disoriented X4  Cognition: Decreased attention/concentration, Decreased command following, Impaired decision making, Poor safety awareness  Safety/Judgement: Decreased awareness of environment, Decreased awareness of need for assistance, Decreased awareness of need for safety, Decreased insight into deficits, Lack of insight into deficits  Skin:  Bruising BUEs  Strength:    Strength: Grossly decreased, non-functional                    Tone & Sensation:   Tone: Normal              Sensation: Intact               Range Of Motion:  AROM: Grossly decreased, non-functional           PROM: Generally decreased, functional           Coordination:  Coordination: Grossly decreased, non-functional     Functional Mobility:  Bed Mobility:  Rolling: Maximum assistance; Additional time;Assist x2  Supine to Sit: Maximum assistance; Additional time;Assist x2  Sit to Supine: Maximum assistance; Additional time;Assist x2  Scooting: Maximum assistance; Additional time;Assist x2  Transfers:  Sit to Stand: Maximum assistance;Assist x2  Stand to Sit: Maximum assistance; Additional time;Assist x2 (poor control to sit)  Stand Pivot Transfers: Assist x2                    Balance:   Sitting: Impaired; With support  Sitting - Static: Fair (occasional)  Sitting - Dynamic: Poor (constant support)  Standing: Impaired; With support  Standing - Static: Constant support;Occassional  Standing - Dynamic : Poor  Ambulation/Gait Training:  Distance (ft): 12 Feet (ft)  Assistive Device: Gait belt;Walker, rollator  Ambulation - Level of Assistance: Moderate assistance; Additional time;Assist x2  Gait Abnormalities: Decreased step clearance; Path deviations; Shuffling gait  Base of Support: Narrowed  Speed/Margret: Shuffled  Step Length: Left shortened;Right shortened     Stairs - Level of Assistance:  (NT)        Functional Measure:  Barthel Index:      Bathin  Bladder: 5  Bowels: 10  Groomin  Dressin  Feedin  Mobility: 5  Stairs: 0  Toilet Use: 0  Transfer (Bed to Chair and Back): 5  Total: 35         Barthel and G-code impairment scale:  Percentage of impairment CH  0% CI  1-19% CJ  20-39% CK  40-59% CL  60-79% CM  80-99% CN  100%   Barthel Score 0-100 100 99-80 79-60 59-40 20-39 1-19    0   Barthel Score 0-20 20 17-19 13-16 9-12 5-8 1-4 0      The Barthel ADL Index: Guidelines  1. The index should be used as a record of what a patient does, not as a record of what a patient could do. 2. The main aim is to establish degree of independence from any help, physical or verbal, however minor and for whatever reason. 3. The need for supervision renders the patient not independent. 4. A patient's performance should be established using the best available evidence. Asking the patient, friends/relatives and nurses are the usual sources, but direct observation and common sense are also important. However direct testing is not needed. 5. Usually the patient's performance over the preceding 24-48 hours is important, but occasionally longer periods will be relevant. 6. Middle categories imply that the patient supplies over 50 per cent of the effort. 7. Use of aids to be independent is allowed. Avinash Hong., Barthel, D.W. ([id number]). Functional evaluation: the Barthel Index. [address] [facility] (14)2. Del Meyers, NARCISA.J.M.JHON, Munira Castillo, Ayo Powers., [place], [address] [place] [address] (). Measuring the change indisability after inpatient rehabilitation; comparison of the responsiveness of the Barthel Index and Functional [place] Measure. Journal of Neurology, Neurosurgery, and Psychiatry, 66(4), 533-925. Liv Maier, N.J.A, ABHISHEK HargroveJ.MARIAM, & Larissa Encinas M.A. (2004.) Assessment of post-stroke quality of life in cost-effectiveness studies: The usefulness of the Barthel Index and the EuroQoL-5D.  Quality of Life Research, 13, 843-57         G codes:  In compliance with CMSs Claims Based Outcome Reporting, the following G-code set was chosen for this patient based on their primary functional limitation being treated: The outcome measure chosen to determine the severity of the functional limitation was the Barthel Index with a score of 35/100 which was correlated with the impairment scale. · Mobility - Walking and Moving Around:               - CURRENT STATUS:    CL - 60%-79% impaired, limited or restricted               - GOAL STATUS:           CK - 40%-59% impaired, limited or restricted               - D/C STATUS:                       ---------------To be determined---------------      Physical Therapy Evaluation Charge Determination   History Examination Presentation Decision-Making   MEDIUM  Complexity : 1-2 comorbidities / personal factors will impact the outcome/ POC  MEDIUM Complexity : 3 Standardized tests and measures addressing body structure, function, activity limitation and / or participation in recreation  MEDIUM Complexity : Evolving with changing characteristics  Other outcome measures Barthel Index MEDIUM      Based on the above components, the patient evaluation is determined to be of the following complexity level: MEDIUM     Pain:  Pain Scale 1: Numeric (0 - 10)  Pain Intensity 1: 0  Pain Location 1: Head  Pain Orientation 1: Posterior  Pain Description 1: Aching  Pain Intervention(s) 1: Medication (see MAR)  Activity Tolerance:   poor  Please refer to the flowsheet for vital signs taken during this treatment.   After treatment:   [X]         Patient left in no apparent distress sitting up in chair  [ ]         Patient left in no apparent distress in bed  [X]         Call bell left within reach  [X]         Nursing notified  [X]         Caregiver present  [ ]         Bed alarm activated      COMMUNICATION/EDUCATION:   The patients plan of care was discussed with: Occupational Therapist and Registered Nurse.  [X]         Fall prevention education was provided and the caregiver indicated understanding.  [X]         Family have participated as able in goal setting and plan of care. [ ]         Patient/family agree to work toward stated goals and plan of care. [ ]         Patient understands intent and goals of therapy, but is neutral about his/her participation. [X]         Patient is unable to participate in goal setting and plan of care.      Thank you for this referral.  Hellen Saldana, PT   Time Calculation: 24 mins

## 2017-02-22 NOTE — INTERDISCIPLINARY ROUNDS
Interdisciplinary team rounds were held 2/22/2017 with the following team members:Care Management, Nutrition, Clinical Coordinator and Unit Nurse Manager. Plan of care discussed. See clinical pathway and/or care plan for interventions and desired outcomes.     Anticipated discharge: today

## 2017-02-22 NOTE — NURSE NAVIGATOR
EMR reviewed. PCP follow up appointment scheduled for 2/28/17 at 11:00am. Details placed on patient's discharge instructions. PCP nurse navigator notified.     Lanny Whitehead RN Nurse Navigator [facility]

## 2017-02-22 NOTE — PROGRESS NOTES
SURGERY PROGRESS NOTE      Admit Date: 2017      Subjective:     Patient has no complaints      Objective:     Visit Vitals    /78 (BP 1 Location: Left arm, BP Patient Position: At rest)    Pulse 73    Temp 98.4 °F (36.9 °C)    Resp 20    Ht 5' 7\" (1.702 m)    Wt 160 lb (72.6 kg)    SpO2 94%    BMI 25.06 kg/m2        Temp (24hrs), Av.2 °F (36.8 °C), Min:97.7 °F (36.5 °C), Max:98.6 °F (37 °C)         1901 -  0700  In: 171.7 [I.V.:171.7]  Out: 300 [Urine:300]    Physical Exam:    General:  alert, cooperative, no distress, appears stated age   Abdomen: soft, bowel sounds active, non-tender           Lab Results  Component Value Date/Time   WBC 9.9 2017 03:11 AM   HGB 10.3 2017 03:11 AM   HCT 32.6 2017 03:11 AM   PLATELET 860  03:11 AM   MCV 92.9 2017 03:11 AM         Assessment:     Principal Problem:    Rectus sheath hematoma (2017)    Active Problems:    Psychiatric disorder ()      Overview: depression      High cholesterol ()      Hypertension ()      Depression ()      Thoracic compression fracture ([facility] [address] (2017)    Doing well.   Hgb stable    Plan:       D/C to SNF tomorrow

## 2017-02-22 NOTE — PROGRESS NOTES
0:  Pt's daughter wanted to know what to expect for d/c. I told her PT/OT is recommending snf for rehab. I gave her a list of snf's to consider. She is planning to stop and visit a couple of them today. DANNIELLE Hutchins    CM Note:  A call was placed to [facility] ([phone]) to notify them of pt's return. It was requested to fax a list of her medications to [phone]. KARLA Shah

## 2017-02-22 NOTE — PROGRESS NOTES
Bedside and Verbal shift change report given to Kelly Hilliard (oncoming nurse) by Maurilio Patterson (offgoing nurse). Report included the following information SBAR, Kardex, Intake/Output, MAR and Recent Results.

## 2017-02-23 VITALS
RESPIRATION RATE: 18 BRPM | WEIGHT: 160 LBS | HEART RATE: 73 BPM | DIASTOLIC BLOOD PRESSURE: 64 MMHG | HEIGHT: 67 IN | BODY MASS INDEX: 25.11 KG/M2 | SYSTOLIC BLOOD PRESSURE: 137 MMHG | OXYGEN SATURATION: 98 % | TEMPERATURE: 98.3 F

## 2017-02-23 PROCEDURE — 74011000250 HC RX REV CODE- 250: Performed by: INTERNAL MEDICINE

## 2017-02-23 PROCEDURE — 74011250637 HC RX REV CODE- 250/637: Performed by: SURGERY

## 2017-02-23 PROCEDURE — 94640 AIRWAY INHALATION TREATMENT: CPT

## 2017-02-23 RX ADMIN — ATORVASTATIN CALCIUM 10 MG: 10 TABLET, FILM COATED ORAL at 08:22

## 2017-02-23 RX ADMIN — ALBUTEROL SULFATE 2.5 MG: 2.5 SOLUTION RESPIRATORY (INHALATION) at 10:20

## 2017-02-23 RX ADMIN — FLUTICASONE PROPIONATE 2 SPRAY: 50 SPRAY, METERED NASAL at 08:22

## 2017-02-23 RX ADMIN — GUAIFENESIN 200 MG: 100 SOLUTION ORAL at 08:22

## 2017-02-23 RX ADMIN — DOCUSATE SODIUM 100 MG: 100 CAPSULE ORAL at 08:22

## 2017-02-23 RX ADMIN — ESCITALOPRAM OXALATE 10 MG: 10 TABLET ORAL at 08:22

## 2017-02-23 NOTE — PROGRESS NOTES
Bedside and Verbal shift change report given to Anushka Cueva RN (oncoming nurse) by Linda Tello RN (offgoing nurse).  Report included the following information SBAR, Kardex, Procedure Summary, Intake/Output, MAR, Accordion, Recent Results and Med Rec Status

## 2017-02-23 NOTE — PROGRESS NOTES
SURGERY PROGRESS NOTE      Admit Date: 2017      Subjective:     Patient has no complaints      Objective:     Visit Vitals    /61 (BP 1 Location: Left arm, BP Patient Position: At rest)    Pulse 92    Temp 98.2 °F (36.8 °C)    Resp 18    Ht 5' 7\" (1.702 m)    Wt 160 lb (72.6 kg)    SpO2 93%    BMI 25.06 kg/m2        Temp (24hrs), Av.2 °F (36.8 °C), Min:97.7 °F (36.5 °C), Max:98.6 °F (37 °C)         1901 -  0700  In: 171.7 [I.V.:171.7]  Out: 300 [Urine:300]    Physical Exam:    General:  alert, cooperative, no distress, appears stated age   Abdomen: soft, bowel sounds active, non-tender   Incision:   healing well, no drainage, no erythema, no hernia, no seroma, no swelling, no dehiscence, incision well approximated             Assessment:     Principal Problem:    Rectus sheath hematoma (2017)    Active Problems:    Psychiatric disorder ()      Overview: depression      High cholesterol ()      Hypertension ()      Depression ()      Thoracic compression fracture ([facility] [address] (2017)      Doing well. Hematoma improving.   Ready for discharge    Plan:     D/C to SNF

## 2017-02-23 NOTE — PROGRESS NOTES
Pharmacist Discharge Medication Reconciliation    Discharge Provider: Dr. Mohan Houser       Discharge Medications:      Current Discharge Medication List        START taking these medications         Dose & Instructions Dispensing Information Comments   Morning Noon Evening Bedtime      traMADol 50 mg tablet   Commonly known as:  ULTRAM       Your next dose is: Today, Tomorrow       Other:  _________            Dose:  50 mg   Take 1 Tab by mouth every six (6) hours as needed for Pain. Max Daily Amount: 200 mg. Quantity:  30 Tab   Refills:  0                               CONTINUE these medications which have CHANGED         Dose & Instructions Dispensing Information Comments   Morning Noon Evening Bedtime      * albuterol 90 mcg/actuation inhaler   Commonly known as:  PROVENTIL HFA, VENTOLIN HFA, PROAIR HFA   What changed:    - how much to take  - when to take this       Your next dose is: Today, Tomorrow       Other:  _________            Dose:  2 Puff   Take 2 puffs by inhalation every four (4) hours as needed for Wheezing or Shortness of Breath. Quantity:  1 Inhaler   Refills:  0                         * albuterol 2 mg/5 mL syrup   Commonly known as:  PROVENTIL, VENTOLIN   What changed:  Another medication with the same name was changed. Make sure you understand how and when to take each. Your next dose is: Today, Tomorrow       Other:  _________            Dose:  2 mg   Take 5 mL by mouth four (4) times daily as needed. Please give pt if she is coughing. If not improving need rx and nebulizer. Quantity:  473 mL   Refills:  2                         guaiFENesin 200 mg/5 mL Liqd   What changed:  how much to take       Your next dose is: Today, Tomorrow       Other:  _________            Dose:  5 mL   Take 5 mL by mouth three (3) times daily. Quantity:  118 mL   Refills:  0                         * Notice: This list has 2 medication(s) that are the same as other medications prescribed for you. Read the directions carefully, and ask your doctor or other care provider to review them with you. CONTINUE these medications which have NOT CHANGED         Dose & Instructions Dispensing Information Comments   Morning Noon Evening Bedtime      acetaminophen 500 mg tablet   Commonly known as:  TYLENOL       Your next dose is: Today, Tomorrow       Other:  _________            Dose:  1000 mg   Take 1,000 mg by mouth every four (4) hours as needed for Pain. Refills:  0                         atorvastatin 10 mg tablet   Commonly known as:  LIPITOR       Your next dose is: Today, Tomorrow       Other:  _________            Dose:  10 mg   Take 1 Tab by mouth daily. Quantity:  30 Tab   Refills:  0                         BISMATROL 262 mg/15 mL suspension   Generic drug:  bismuth subsalicylate       Your next dose is: Today, Tomorrow       Other:  _________            Dose:  30 mL   Take 30 mL by mouth as needed (After each loose stool). Refills:  0                         cholecalciferol 1,000 unit Cap   Commonly known as:  VITAMIN D3       Your next dose is: Today, Tomorrow       Other:  _________            Take 1 capsule by mouth daily    Quantity:  90 Cap   Refills:  1                         escitalopram oxalate 10 mg tablet   Commonly known as:  LEXAPRO       Your next dose is: Today, Tomorrow       Other:  _________            Dose:  10 mg   Take 1 Tab by mouth daily. Quantity:  90 Tab   Refills:  1                         fluticasone 50 mcg/actuation nasal spray   Commonly known as:  FLONASE       Your next dose is: Today, Tomorrow       Other:  _________            Dose:  2 Spray   2 Sprays by Both Nostrils route daily. Refills:  0                         PRESERVISION AREDS Cap capsule   Generic drug:  Vit A,C,E-Zinc-Copper       Your next dose is: Today, Tomorrow       Other:  _________            Dose:  1 Cap   Take 1 Cap by mouth two (2) times a day.     Refills:  0 therapeutic multivitamin tablet   Commonly known as:  [facility] next dose is: Today, Tomorrow       Other:  _________            Dose:  1 Tab   Take 1 Tab by mouth daily. Refills:  0                                  Where to Get Your Medications        Information on where to get these meds will be given to you by the nurse or doctor. ! Ask your nurse or doctor about these medications     traMADol 50 mg tablet              Medications reviewed for discharge   Patient being discharged to SNF   Tramadol added for pain management   Other medications are appropriate for discharge    Thank you for the consult,  Greg Tanner

## 2017-02-23 NOTE — PROGRESS NOTES
Bedside and Verbal shift change report given to Abdullahi Metcalf (oncoming nurse) by Juan Rojas RN (offgoing nurse). Report included the following information SBAR, Kardex, Intake/Output, MAR, Accordion and Recent Results.

## 2017-02-23 NOTE — PROGRESS NOTES
CM Note:  A referral was sent in [facility] to [employer]. Pt accepted. D/c info sent in [facility] to snf. Pt's daughter to transport her at d/c.  Nursing to call report to [facility] at [phone]. KARLA Pérez

## 2017-02-23 NOTE — PROGRESS NOTES
Report given to Anthony ([place]), RN at the [employer]. Opportunity to ask questions provided. No question at this time. Patient discharged per MD order. Discharge instructions and prescriptions reviewed with patient's daughter. Patient's daughter verbalized understanding. IV removed with tip intact. Opportunity to ask questions were provided. Daughter provided transportation to the [facility].

## 2017-02-27 ENCOUNTER — PATIENT OUTREACH (OUTPATIENT)
Dept: INTERNAL MEDICINE CLINIC | Age: 82
End: 2017-02-27

## 2017-02-27 NOTE — PROGRESS NOTES
NNTOCIP call. Patient discharged on 2/23/17 from [facility]. Admission dates: 2/20/17 - 2/23/17. Diagnosis: rectus sheath hematoma. Pt was discharged to SNF [facility]. Spoke with MAITE Terry. PER CN, pt is pretty good except for a \"little sundowning. \" Pt is participating with PT/OT. V/S 99.0 - 75 - 18 - 132/94. Pt's care is currently being managed by [facility] house MD Dr. Jus Tijerina. Reminded Cn that pt will need to f/u with PCP within 7 days of d/c. This note will not be viewable in [address]

## 2017-03-02 ENCOUNTER — TELEPHONE (OUTPATIENT)
Dept: INTERNAL MEDICINE CLINIC | Age: 82
End: 2017-03-02

## 2017-03-02 NOTE — TELEPHONE ENCOUNTER
Arcelia Lemus (daughter) call in would like to speak with nurse about having pts albuterol script change from daily dose rather than as needed she has pneumonia.   Please call her at [phone]

## 2017-03-02 NOTE — TELEPHONE ENCOUNTER
Return call to pt's daughter, she did not answer and cannot leave v/m as daughter voicemail box is full.

## 2017-03-04 NOTE — DISCHARGE SUMMARY
Mikala Card DISCHARGE SUMMARY      Patient ID:  Abdi Carlos  [id number]  30 y.o.  11/18/1924    Admit date: 2/20/2017    Discharge date and time: 2/23/2017  1:28 PM     Admitting Physician: Meir Hines MD     Discharge Physician: Meir Hines MD    Admission Diagnoses: Rectus sheath hematoma    Discharge Diagnoses: Principal Problem:    Rectus sheath hematoma (2/20/2017)    Active Problems:    Psychiatric disorder ()      Overview: depression      High cholesterol ()      Hypertension ()      Depression ()      Thoracic compression fracture ([address] (2/21/2017)        Procedures: none  Consults: hospitalist and orthopedic surgery      Hospital Course:   Patient was admitted from the ER following a fall causing two main injuries, a thoracic compression fracture and a rectums sheath hematoma. Orthopeadics was consulted and felt the thoracic compression fracture was not new and recommended pain control only. She was observed for 48 hours. Her hemoglobin remained stable and hematoma did no enlarged. She was evaluated by physical and occupational therapy and it was recommended she be discharged to a SNF. On discharge, she was pain free and tolerating a diet. D/C Disposition: SNF     Patient Instructions:   Cannot display discharge medications since this patient is not currently admitted.       Diet: resume pre-hospital diet    Follow-up with PCP in 2 weeks       Signed:  Meir Hines MD  3/4/2017  11:20 AM

## 2017-03-15 ENCOUNTER — HOSPITAL ENCOUNTER (OUTPATIENT)
Dept: GENERAL RADIOLOGY | Age: 82
Discharge: HOME | End: 2017-03-15
Attending: FAMILY MEDICINE
Payer: MEDICARE

## 2017-03-15 DIAGNOSIS — R13.12 OROPHARYNGEAL DYSPHAGIA: ICD-10-CM

## 2017-03-15 PROCEDURE — G8998 SWALLOW D/C STATUS: HCPCS

## 2017-03-15 PROCEDURE — G8997 SWALLOW GOAL STATUS: HCPCS

## 2017-03-15 PROCEDURE — 74230 X-RAY XM SWLNG FUNCJ C+: CPT

## 2017-03-15 PROCEDURE — G8996 SWALLOW CURRENT STATUS: HCPCS

## 2017-03-15 PROCEDURE — 92611 MOTION FLUOROSCOPY/SWALLOW: CPT

## 2017-03-28 ENCOUNTER — HOSPITAL ENCOUNTER (OUTPATIENT)
Dept: LAB | Age: 82
Discharge: HOME | End: 2017-03-28
Payer: MEDICARE

## 2017-03-28 ENCOUNTER — OFFICE VISIT (OUTPATIENT)
Dept: INTERNAL MEDICINE CLINIC | Age: 82
End: 2017-03-28

## 2017-03-28 VITALS
RESPIRATION RATE: 12 BRPM | BODY MASS INDEX: 24.96 KG/M2 | HEART RATE: 68 BPM | SYSTOLIC BLOOD PRESSURE: 133 MMHG | DIASTOLIC BLOOD PRESSURE: 60 MMHG | WEIGHT: 159 LBS | OXYGEN SATURATION: 99 % | HEIGHT: 67 IN | TEMPERATURE: 97.7 F

## 2017-03-28 DIAGNOSIS — E78.00 HIGH CHOLESTEROL: ICD-10-CM

## 2017-03-28 DIAGNOSIS — R53.82 CHRONIC FATIGUE: ICD-10-CM

## 2017-03-28 DIAGNOSIS — I10 ESSENTIAL HYPERTENSION: ICD-10-CM

## 2017-03-28 DIAGNOSIS — R42 DIZZINESS: Primary | ICD-10-CM

## 2017-03-28 DIAGNOSIS — D50.8 OTHER IRON DEFICIENCY ANEMIA: ICD-10-CM

## 2017-03-28 DIAGNOSIS — M81.0 OSTEOPOROSIS: ICD-10-CM

## 2017-03-28 DIAGNOSIS — R20.2 PARESTHESIA: ICD-10-CM

## 2017-03-28 PROCEDURE — 83550 IRON BINDING TEST: CPT

## 2017-03-28 PROCEDURE — 82728 ASSAY OF FERRITIN: CPT

## 2017-03-28 PROCEDURE — 84443 ASSAY THYROID STIM HORMONE: CPT

## 2017-03-28 PROCEDURE — 80053 COMPREHEN METABOLIC PANEL: CPT

## 2017-03-28 PROCEDURE — 84439 ASSAY OF FREE THYROXINE: CPT

## 2017-03-28 PROCEDURE — 82607 VITAMIN B-12: CPT

## 2017-03-28 PROCEDURE — 82306 VITAMIN D 25 HYDROXY: CPT

## 2017-03-28 PROCEDURE — 85027 COMPLETE CBC AUTOMATED: CPT

## 2017-03-28 NOTE — MR AVS SNAPSHOT
Visit Information Date & Time Provider Department Dept. Phone Encounter #  
 3/28/2017  1:15 PM Radha Murdock MD [facility] [address] [id number] Upcoming Health Maintenance Date Due ZOSTER VACCINE AGE 60> 11/18/1984 GLAUCOMA SCREENING Q2Y 11/18/1989 MEDICARE YEARLY EXAM 7/15/2017 Pneumococcal 65+ High/Highest Risk (2 of 2 - PPSV23) 11/1/2017 DTaP/Tdap/Td series (2 - Td) 4/28/2024 Allergies as of 3/28/2017  Review Complete On: 3/28/2017 By: Radha Murdock MD  
  
 Severity Noted Reaction Type Reactions Latex  08/26/2012    Unknown (comments) Pt unable to report Codeine  08/26/2012    Unknown (comments) Sulfa Dyne  08/26/2012    Other (comments) \"Crying jag\" Current Immunizations  Reviewed on 10/14/2016 Name Date Influenza High Dose Vaccine PF 10/4/2016 Influenza Vaccine 10/1/2014, 11/1/2012 Pneumococcal Vaccine (Unspecified Type) 11/1/2012 Tdap 4/28/2014  2:44 AM  
  
 Not reviewed this visit You Were Diagnosed With   
  
 Codes Comments Dizziness    -  Primary ICD-10-CM: X80 ICD-9-CM: 780.4 Essential hypertension     ICD-10-CM: I10 
ICD-9-CM: 401.9 Paresthesia     ICD-10-CM: R20.2 ICD-9-CM: 782.0 High cholesterol     ICD-10-CM: E78.00 ICD-9-CM: 272.0 Osteoporosis     ICD-10-CM: M81.0 ICD-9-CM: 733.00 Other iron deficiency anemia     ICD-10-CM: D50.8 Chronic fatigue     ICD-10-CM: R53.82 
ICD-9-CM: 780.79 Vitals BP Pulse Temp Resp Height(growth percentile) Weight(growth percentile) 133/60 (BP 1 Location: Left arm, BP Patient Position: Sitting) 68 97.7 °F (36.5 °C) (Oral) 12 5' 7\" (1.702 m) 159 lb (72.1 kg) SpO2 BMI OB Status Smoking Status 99% 24.9 kg/m2 Postmenopausal Former Smoker Vitals History BMI and BSA Data Body Mass Index Body Surface Area 24.9 kg/m 2 1.85 m 2 Preferred Pharmacy Pharmacy Name Phone [facility] [phone] Your Updated Medication List  
  
   
This list is accurate as of: 3/28/17  2:30 PM.  Always use your most recent med list.  
  
  
  
  
 acetaminophen 500 mg tablet Commonly known as:  TYLENOL Take 1,000 mg by mouth every four (4) hours as needed for Pain. * albuterol 90 mcg/actuation inhaler Commonly known as:  PROVENTIL HFA, VENTOLIN HFA, PROAIR HFA Take 2 puffs by inhalation every four (4) hours as needed for Wheezing or Shortness of Breath. * albuterol 2 mg/5 mL syrup Commonly known as:  Nguyen Katrin Take 5 mL by mouth four (4) times daily as needed. Please give pt if she is coughing. If not improving need rx and nebulizer. BISMATROL 262 mg/15 mL suspension Generic drug:  bismuth subsalicylate Take 30 mL by mouth as needed (After each loose stool). cholecalciferol 1,000 unit Cap Commonly known as:  VITAMIN D3 Take 1 capsule by mouth daily  
  
 escitalopram oxalate 10 mg tablet Commonly known as:  Celesta Murders Take 1 Tab by mouth daily. fluticasone 50 mcg/actuation nasal spray Commonly known as:  [place] 2 Sprays by Both Nostrils route daily. guaiFENesin 200 mg/5 mL Liqd Take 5 mL by mouth three (3) times daily. therapeutic multivitamin tablet Commonly known as:  [facility] Take 1 Tab by mouth daily. traMADol 50 mg tablet Commonly known as:  ULTRAM  
Take 1 Tab by mouth every six (6) hours as needed for Pain. Max Daily Amount: 200 mg.  
  
 * Notice: This list has 2 medication(s) that are the same as other medications prescribed for you. Read the directions carefully, and ask your doctor or other care provider to review them with you. We Performed the Following CBC W/O DIFF [90261 CPT(R)] FERRITIN [56868 CPT(R)] IRON PROFILE [id number] CPT(R)] METABOLIC PANEL, COMPREHENSIVE [63263 CPT(R)] REFERRAL TO OPHTHALMOLOGY [REF57 Custom] Comments:  
 htn  
 T4, FREE [id number] CPT(R)] TSH 3RD GENERATION [05169 CPT(R)] VITAMIN B12 & FOLATE [55227 CPT(R)] VITAMIN D, 25 HYDROXY [id number] CPT(R)] Referral Information Referral ID Referred By Referred To  
  
 [id number] Bina White [facility] [address] [place], [address] Visits Status Start Date End Date 1 New Request 3/28/17 3/28/18 If your referral has a status of pending review or denied, additional information will be sent to support the outcome of this decision. Introducing [facility]! Reid Horan introduces [employer] patient portal. Now you can access parts of your medical record, email your doctor's office, and request medication refills online. 1. In your internet browser, go to [email or web address] 2. Click on the First Time User? Click Here link in the Sign In box. You will see the New Member Sign Up page. 3. Enter your [employer] Access Code exactly as it appears below. You will not need to use this code after youve completed the sign-up process. If you do not sign up before the expiration date, you must request a new code. · [employer] Access Code: [email or web address] Expires: 5/21/2017  2:54 PM 
 
4. Enter the last four digits of your Social Security Number (xxxx) and Date of Birth (mm/dd/yyyy) as indicated and click Submit. You will be taken to the next sign-up page. 5. Create a [employer] ID. This will be your [employer] login ID and cannot be changed, so think of one that is secure and easy to remember. 6. Create a [employer] password. You can change your password at any time. 7. Enter your Password Reset Question and Answer. This can be used at a later time if you forget your password. 8. Enter your e-mail address. You will receive e-mail notification when new information is available in [address] 9. Click Sign Up. You can now view and download portions of your medical record. 10. Click the Download Summary menu link to download a portable copy of your medical information. If you have questions, please visit the Frequently Asked Questions section of the [employer] website. Remember, [employer] is NOT to be used for urgent needs. For medical emergencies, dial 911. Now available from your iPhone and Android! Please provide this summary of care documentation to your next provider. Your primary care clinician is listed as Koko Guerra. If you have any questions after today's visit, please call [phone].

## 2017-03-28 NOTE — PROGRESS NOTES
Head neck thoracic   Peritoneal bleed    After 4 days to reza  PT x 1, daughter visited and found to be wheezing    Left foot  Red swollen and hot    Xray foot    Pneumonia put on augmentin  Swallow study was negative passed  She stayed on nectar consistency      Dr. Kayleigh Oliva evaluated   Uric acid level   Xray foot severe osteoporosis    Loose bowels    Spring     Raspyness     Transition       Chief Complaint   Patient presents with   [facility] Follow Up     after rehab- dizziness, hoarse       Pt presents with [place], daughter and other daugther from [facility]. Since last visit, pt fell backwards and hit head at [address] her living residence. Pt developed large occipital hematoma, rectus abdominus sheath hematoma, and thoracic compression fractures. She was released from hospital and was transferred to SNF at the [address]. At the [address] she was diagnosed with possible cellulitis vs gout and pneumonia. She was treated with augmentin and had follow up xray revealing resolution of pneumonia. orthostasis  Pt reports significant dizziness from sitting or supine to standing. She has not had further falls. She has been in bed for several days from hematoma and pneumonia recovery. No cp or sob with standing but lightheadedness    Anemia  She had hematoma in hospital. She was not given transfusion and was not told to take iron    Hyperlipidemia  She is still on cholesterol medication, no myalgias or se    Subjective:   Christiana Olson is a 80 y.o. female with hypertension. Hypertension ROS: taking medications as instructed, no medication side effects noted, no TIA's, no chest pain on exertion, no dyspnea on exertion, no swelling of ankles. New concerns: will monitor bp as noted lightheadedness, bp may elevate when supine.            Past Medical History:   Diagnosis Date    Arthritis     Dr. Ze Cruz [facility])     colon CA in situ    Depression     Hematoma (nontraumatic) of breast Dr. Fain Paget freeman breast surgeon    High cholesterol     Hypertension     Osteoporosis     Dr. Oni Mcmahan fall fractured vertebrae    Psychiatric disorder     depression    Umbilical hernia      Past Surgical History:   Procedure Laterality Date    HX COLECTOMY  1985    HX HEENT      cateracts bilateral    HX HIP REPLACEMENT  2009    Right    HX ORTHOPAEDIC  2012    T11 & L1 vertebrae fx    HX PELVIC FRACTURE [place]  2010     Social History     Social History    Marital status:      Spouse name: N/A    Number of children: N/A    Years of education: N/A     Social History Main Topics    Smoking status: Former Smoker     Packs/day: 0.50     Quit date: 1/1/1970    Smokeless tobacco: Never Used    Alcohol use Yes      Comment: occasional    Drug use: No    Sexual activity: No     Other Topics Concern    None     Social History Narrative    [employer] [employer]    Can cook if she wants        3 children    2 daughters and 1 son        No smoke        Drink- wine, or bourbon or vodka 1 ounce     Family History   Problem Relation Age of Onset    Heart Disease Mother      Current Outpatient Prescriptions   Medication Sig Dispense Refill    traMADol (ULTRAM) 50 mg tablet Take 1 Tab by mouth every six (6) hours as needed for Pain. Max Daily Amount: 200 mg. 30 Tab 0    fluticasone (FLONASE) 50 mcg/actuation nasal spray 2 Sprays by Both Nostrils route daily.  therapeutic multivitamin (THERAGRAN) tablet Take 1 Tab by mouth daily.  acetaminophen (TYLENOL) 500 mg tablet Take 1,000 mg by mouth every four (4) hours as needed for Pain.  bismuth subsalicylate (BISMATROL) 262 mg/15 mL suspension Take 30 mL by mouth as needed (After each loose stool).  guaiFENesin 200 mg/5 mL liqd Take 5 mL by mouth three (3) times daily.  (Patient taking differently: Take 10 mL by mouth three (3) times daily.) 118 mL 0    albuterol (PROVENTIL, VENTOLIN) 2 mg/5 mL syrup Take 5 mL by mouth four (4) times daily as needed. Please give pt if she is coughing. If not improving need rx and nebulizer. 473 mL 2    escitalopram oxalate (LEXAPRO) 10 mg tablet Take 1 Tab by mouth daily. 90 Tab 1    Cholecalciferol, Vitamin D3, 1,000 unit cap Take 1 capsule by mouth daily 90 Cap 1    albuterol (PROVENTIL HFA, VENTOLIN HFA, PROAIR HFA) 90 mcg/actuation inhaler Take 2 puffs by inhalation every four (4) hours as needed for Wheezing or Shortness of Breath. (Patient taking differently: Take 1 Puff by inhalation every six (6) hours as needed for Wheezing or Shortness of Breath.) 1 Inhaler 0     Allergies   Allergen Reactions    Latex Unknown (comments)     Pt unable to report      Codeine Unknown (comments)    Sulfa Dyne Other (comments)     \"Crying jag\"       Review of Systems - General ROS: positive for  - fatigue and malaise  negative for - chills, fever, sleep disturbance, weight gain or weight loss  Cardiovascular ROS: no chest pain or dyspnea on exertion  Respiratory ROS: no cough, shortness of breath, or wheezing    Visit Vitals    /60 (BP 1 Location: Left arm, BP Patient Position: Sitting)    Pulse 68    Temp 97.7 °F (36.5 °C) (Oral)    Resp 12    Ht 5' 7\" (1.702 m)    Wt 159 lb (72.1 kg)    SpO2 99%    BMI 24.9 kg/m2     General Appearance:  Well developed, well nourished,alert and oriented x 3, and individual in no acute distress. Ears/Nose/Mouth/Throat:   Hearing grossly normal.         Neck: Supple, no lad, no bruits   Chest:   Lungs clear to auscultation bilaterally. Cardiovascular:  Regular rate and rhythm, S1, S2 normal, no murmur. Abdomen:   Soft, non-tender, bowel sounds are active. Extremities: No edema bilaterally. Skin: Warm and dry, no suspicious lesions                 Trinh Davis was seen today for hospital follow up.     Diagnoses and all orders for this visit:    Dizziness  Deconditioning and anemia mode  -     CBC W/O DIFF  -     TSH 3RD GENERATION  -     T4, FREE    Essential hypertension  Cont meds  -     REFERRAL TO OPHTHALMOLOGY  -     METABOLIC PANEL, COMPREHENSIVE    Paresthesia  -     TSH 3RD GENERATION  -     VITAMIN B12 & FOLATE    High cholesterol  -     METABOLIC PANEL, COMPREHENSIVE    Osteoporosis  -     VITAMIN D, 25 HYDROXY    Other iron deficiency anemia  -     CBC W/O DIFF  -     FERRITIN  -     IRON PROFILE    Chronic fatigue  -     TSH 3RD GENERATION  -     T4, FREE          I spent 25 min with this patient and family members and >50% of the time was spent on counseling and management of orthostasis. I think much of this may be deconditioning from hospitalization, as well as anemia not treated. She may need iron. I took her off statin as literature does now seem to support continued benefit in this age group and may be depleting coq10. This note will not be viewable in [address]

## 2017-03-29 LAB
25(OH)D3+25(OH)D2 SERPL-MCNC: 43.1 NG/ML (ref 30–100)
ALBUMIN SERPL-MCNC: 4 G/DL (ref 3.2–4.6)
ALBUMIN/GLOB SERPL: 1.7 {RATIO} (ref 1.2–2.2)
ALP SERPL-CCNC: 96 IU/L (ref 39–117)
ALT SERPL-CCNC: 11 IU/L (ref 0–32)
AST SERPL-CCNC: 13 IU/L (ref 0–40)
BILIRUB SERPL-MCNC: 0.3 MG/DL (ref 0–1.2)
BUN SERPL-MCNC: 10 MG/DL (ref 10–36)
BUN/CREAT SERPL: 14 (ref 11–26)
CALCIUM SERPL-MCNC: 9.4 MG/DL (ref 8.7–10.3)
CHLORIDE SERPL-SCNC: 98 MMOL/L (ref 96–106)
CO2 SERPL-SCNC: 25 MMOL/L (ref 18–29)
CREAT SERPL-MCNC: 0.72 MG/DL (ref 0.57–1)
ERYTHROCYTE [DISTWIDTH] IN BLOOD BY AUTOMATED COUNT: 15.2 % (ref 12.3–15.4)
FERRITIN SERPL-MCNC: 155 NG/ML (ref 15–150)
FOLATE SERPL-MCNC: 18.7 NG/ML
GLOBULIN SER CALC-MCNC: 2.4 G/DL (ref 1.5–4.5)
GLUCOSE SERPL-MCNC: 85 MG/DL (ref 65–99)
HCT VFR BLD AUTO: 37.9 % (ref 34–46.6)
HGB BLD-MCNC: 12.1 G/DL (ref 11.1–15.9)
IRON SATN MFR SERPL: 13 % (ref 15–55)
IRON SERPL-MCNC: 36 UG/DL (ref 27–139)
MCH RBC QN AUTO: 28.1 PG (ref 26.6–33)
MCHC RBC AUTO-ENTMCNC: 31.9 G/DL (ref 31.5–35.7)
MCV RBC AUTO: 88 FL (ref 79–97)
PLATELET # BLD AUTO: 276 X10E3/UL (ref 150–379)
POTASSIUM SERPL-SCNC: 4.3 MMOL/L (ref 3.5–5.2)
PROT SERPL-MCNC: 6.4 G/DL (ref 6–8.5)
RBC # BLD AUTO: 4.3 X10E6/UL (ref 3.77–5.28)
SODIUM SERPL-SCNC: 140 MMOL/L (ref 134–144)
T4 FREE SERPL-MCNC: 1.29 NG/DL (ref 0.82–1.77)
TIBC SERPL-MCNC: 279 UG/DL (ref 250–450)
TSH SERPL DL<=0.005 MIU/L-ACNC: 1.34 UIU/ML (ref 0.45–4.5)
UIBC SERPL-MCNC: 243 UG/DL (ref 118–369)
VIT B12 SERPL-MCNC: 526 PG/ML (ref 211–946)
WBC # BLD AUTO: 8.6 X10E3/UL (ref 3.4–10.8)

## 2017-04-25 ENCOUNTER — HOSPITAL ENCOUNTER (EMERGENCY)
Age: 82
Discharge: HOME | End: 2017-04-25
Attending: EMERGENCY MEDICINE
Payer: MEDICARE

## 2017-04-25 ENCOUNTER — APPOINTMENT (OUTPATIENT)
Dept: CT IMAGING | Age: 82
End: 2017-04-25
Attending: EMERGENCY MEDICINE
Payer: MEDICARE

## 2017-04-25 VITALS
RESPIRATION RATE: 16 BRPM | SYSTOLIC BLOOD PRESSURE: 111 MMHG | OXYGEN SATURATION: 93 % | HEART RATE: 62 BPM | DIASTOLIC BLOOD PRESSURE: 56 MMHG | BODY MASS INDEX: 24.98 KG/M2 | TEMPERATURE: 97.5 F | HEIGHT: 67 IN | WEIGHT: 159.17 LBS
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
## 2017-04-25 DIAGNOSIS — S00.93XA CONTUSION OF HEAD, UNSPECIFIED PART OF HEAD, INITIAL ENCOUNTER: ICD-10-CM

## 2017-04-25 PROCEDURE — 99284 EMERGENCY DEPT VISIT MOD MDM: CPT

## 2017-04-25 PROCEDURE — 70450 CT HEAD/BRAIN W/O DYE: CPT

## 2017-04-25 NOTE — ED PROVIDER NOTES
HPI Comments: 80 y.o. female with past medical history significant for Dementia, HTN, Osteoporosis, Right hip replacement, Pelvic fracture, Compression fractures who presents from [place] via EMS for evaluation s/p  GLF. Pt reports she ambulated from bed pta at which time she tripped and fell backwards striking posterior aspect of skull on room floor. Pt denies pain to this site. She denies any other known injuries. No lightheadedness, dizziness, headache, chest pain or SOB. There are no other acute medical concerns at this time. Full history, physical exam, and ROS unable to be obtained due to:  dementia. Old chart review: Pt admitted 2/20/17-2/23/17 s/p fall. Diagnosed with rectus sheath hematoma and thoracic compression fracture. Hemoglobin remained stable, hematoma did not enlarge. Pt discharged home to f/u with ortho. PCP: Jinny Hashimoto, MD    Note written by Anahi Wood, as dictated by Gildardo Connlel DO 2:45 AM    The history is provided by the patient. No  was used. Past Medical History:   Diagnosis Date    Arthritis     Dr. Patterson [facility])     colon CA in situ    Depression     Hematoma (nontraumatic) of breast     Dr. Magan freeman breast surgeon    High cholesterol     Hypertension     Osteoporosis     Dr. Clifton Hernandez fall fractured vertebrae    Psychiatric disorder     depression    Umbilical hernia        Past Surgical History:   Procedure Laterality Date    HX COLECTOMY  1985    HX HEENT      cateracts bilateral    HX HIP REPLACEMENT  2009    Right    HX ORTHOPAEDIC  2012    T11 & L1 vertebrae fx    HX PELVIC FRACTURE TX  2010         Family History:   Problem Relation Age of Onset    Heart Disease Mother        Social History     Social History    Marital status:      Spouse name: N/A    Number of children: N/A    Years of education: N/A     Occupational History    Not on file.      Social History Main Topics    Smoking status: Former Smoker     Packs/day: 0.50     Quit date: 1/1/1970    Smokeless tobacco: Never Used    Alcohol use Yes      Comment: occasional    Drug use: No    Sexual activity: No     Other Topics Concern    Not on file     Social History Narrative    [facility]    Can cook if she wants        3 children    2 daughters and 1 son        No smoke        Drink- wine, or bourbon or vodka 1 ounce     ALLERGIES: Latex; Codeine; and Sulfa dyne    Review of Systems   Unable to perform ROS: Dementia       Vitals:    04/25/17 0237   BP: 151/58   Pulse: 62   Resp: 16   Temp: 97.5 °F (36.4 °C)   SpO2: 100%   Weight: 72.2 kg (159 lb 2.8 oz)   Height: 5' 7\" (1.702 m)            Physical Exam   Constitutional: She appears well-developed and well-nourished. No distress. HENT:   Head: Normocephalic. Head is with contusion. Right Ear: External ear normal.   Left Ear: External ear normal.   Nose: Nose normal.   Mouth/Throat: Oropharynx is clear and moist. No oropharyngeal exudate. Contusion to left occipital aspect of skull   Eyes: Conjunctivae and EOM are normal. Pupils are equal, round, and reactive to light. Right eye exhibits no discharge. Left eye exhibits no discharge. No scleral icterus. Neck: Normal range of motion. Neck supple. No JVD present. No tracheal deviation present. Cardiovascular: Normal rate, regular rhythm, normal heart sounds and intact distal pulses. Exam reveals no gallop and no friction rub. No murmur heard. Pulmonary/Chest: Effort normal and breath sounds normal. No stridor. No respiratory distress. She has no decreased breath sounds. She has no wheezes. She has no rhonchi. She has no rales. She exhibits no tenderness. Abdominal: Soft. Bowel sounds are normal. She exhibits no distension. There is no tenderness. There is no rebound and no guarding. Musculoskeletal: Normal range of motion. She exhibits no edema or tenderness. Neurological: She is alert. She has normal strength and normal reflexes. No cranial nerve deficit or sensory deficit. She exhibits normal muscle tone. Coordination normal. GCS eye subscore is 4. GCS verbal subscore is 5. GCS motor subscore is 6. Oriented to person and place, not time. Skin: Skin is warm and dry. No rash noted. She is not diaphoretic. No erythema. No pallor. Psychiatric: She has a normal mood and affect. Her behavior is normal. Judgment and thought content normal.   Nursing note and vitals reviewed. Note written by Anahi Quintero, as dictated by Tone Roy DO 2:47 AM    MDM  Number of Diagnoses or Management Options  Contusion of head, unspecified part of head, initial encounter:   Fall, initial encounter:      Amount and/or Complexity of Data Reviewed  Tests in the radiology section of CPT®: ordered and reviewed    Risk of Complications, Morbidity, and/or Mortality  Presenting problems: moderate  Diagnostic procedures: moderate  Management options: low    Patient Progress  Patient progress: stable    ED Course       Procedures     PROGRESS NOTE:  3:30 AM  CT negative. Will discharge back to facility. Chief Complaint   Patient presents with    Fall       4:16 AM  The patients presenting problems have been discussed, and they are in agreement with the care plan formulated and outlined with them. I have encouraged them to ask questions as they arise throughout their visit. MEDICATIONS GIVEN:  Medications - No data to display    LABS REVIEWED:  No results found for this or any previous visit (from the past 24 hour(s)).     VITAL SIGNS:  Patient Vitals for the past 12 hrs:   Temp Pulse Resp BP SpO2   04/25/17 0330 - - - 111/56 93 %   04/25/17 0319 - - - 130/65 95 %   04/25/17 0245 - - - 134/63 100 %   04/25/17 0237 97.5 °F (36.4 °C) 62 16 151/58 100 %       RADIOLOGY RESULTS:  The following have been ordered and reviewed:  CT HEAD WO CONT   Final Result Study Result      EXAM: CT HEAD WO CONT     INDICATION: head injury     COMPARISON: None.     TECHNIQUE: Unenhanced CT of the head was performed using 5 mm images. Brain and  bone windows were generated. CT dose reduction was achieved through use of a  standardized protocol tailored for this examination and automatic exposure  control for dose modulation.      FINDINGS: There is no acute intracranial hemorrhage, mass, mass effect or  herniation. Ventricles and sulci show proportionate and symmetric prominence. There is periventricular white matter hypodensity. The gray-white matter  differentiation is well-preserved. Atherosclerotic calcifications are seen  within the carotid siphons and vertebral arteries. The mastoid air cells are  well pneumatized. The visualized paranasal sinuses are normal.     IMPRESSION  IMPRESSION: No acute intracranial hemorrhage, mass or infarct. Cerebral atrophy  and white matter change most compatible chronic small vessel ischemic and/or  senescent change. Intracranial atherosclerosis. PROGRESS NOTES:  Discussed results and plan with patient. Patient will be discharged back to facility with PCP followup. Patient instructed to return to the emergency room for any worsening symptoms or any other concerns. DIAGNOSIS:    1. Fall, initial encounter    2. Contusion of head, unspecified part of head, initial encounter        PLAN:  Follow-up Information     Follow up With Details Comments [address], [place] In 1 week  [address] [place]  [address] [facility] [address] [address]  [phone]      [facility] EMERGENCY DEPT  If symptoms worsen [address] [facility]  [phone]        Current Discharge Medication List      CONTINUE these medications which have NOT CHANGED    Details   fluticasone (FLONASE) 50 mcg/actuation nasal spray 2 Sprays by Both Nostrils route daily.       therapeutic multivitamin [facility]) tablet Take 1 Tab by mouth daily. escitalopram oxalate (LEXAPRO) 10 mg tablet Take 1 Tab by mouth daily. Qty: 90 Tab, Refills: 1    Associated Diagnoses: Other depression      Cholecalciferol, Vitamin D3, 1,000 unit cap Take 1 capsule by mouth daily  Qty: 90 Cap, Refills: 1      traMADol (ULTRAM) 50 mg tablet Take 1 Tab by mouth every six (6) hours as needed for Pain. Max Daily Amount: 200 mg. Qty: 30 Tab, Refills: 0      acetaminophen (TYLENOL) 500 mg tablet Take 1,000 mg by mouth every four (4) hours as needed for Pain. bismuth subsalicylate (BISMATROL) 262 mg/15 mL suspension Take 30 mL by mouth as needed (After each loose stool). guaiFENesin 200 mg/5 mL liqd Take 5 mL by mouth three (3) times daily. Qty: 118 mL, Refills: 0    Associated Diagnoses: Cough      albuterol (PROVENTIL, VENTOLIN) 2 mg/5 mL syrup Take 5 mL by mouth four (4) times daily as needed. Please give pt if she is coughing. If not improving need rx and nebulizer. Qty: 473 mL, Refills: 2    Associated Diagnoses: Cough      albuterol (PROVENTIL HFA, VENTOLIN HFA, PROAIR HFA) 90 mcg/actuation inhaler Take 2 puffs by inhalation every four (4) hours as needed for Wheezing or Shortness of Breath. Qty: 1 Inhaler, Refills: 0               ED COURSE: The patients hospital course has been uncomplicated.

## 2017-04-25 NOTE — ED NOTES
Pt discharged by provider; waiting for EMS transport back to [address] [place] [address]. Pt resting comfortably in room.

## 2017-04-25 NOTE — DISCHARGE INSTRUCTIONS
We hope that we have addressed all of your medical concerns. The examination and treatment you received in the Emergency Department were for an emergent problem and were not intended as complete care. It is important that you follow up with your healthcare provider(s) for ongoing care. If your symptoms worsen or do not improve as expected, and you are unable to reach your usual health care provider(s), you should return to the Emergency Department. Today's healthcare is undergoing tremendous change, and patient satisfaction surveys are one of the many tools to assess the quality of medical care. You may receive a survey from the [employer] regarding your experience in the Emergency Department. I hope that your experience has been completely positive, particularly the medical care that I provided. As such, please participate in the survey; anything less than excellent does not meet my expectations or intentions. [facility], [place] and [employer] participate in nationally recognized quality of care measures. If your blood pressure is greater than 120/80, as reported below, we urge that you seek medical care to address the potential of high blood pressure, commonly known as hypertension. Hypertension can be hereditary or can be caused by certain medical conditions, pain, stress, or \"white coat syndrome. \"       Please make an appointment with your health care provider(s) for follow up of your Emergency Department visit. VITALS:   Patient Vitals for the past 8 hrs:   Temp Pulse Resp BP SpO2   04/25/17 0319 - - - 130/65 95 %   04/25/17 0245 - - - 134/63 100 %   04/25/17 0237 97.5 °F (36.4 °C) 62 16 151/58 100 %          Thank you for allowing us to provide you with medical care today. We realize that you have many choices for your emergency care needs. Please choose us in the future for any continued health care needs.       Regards, Tonio Howell [place] [address]: [phone]            No results found for this or any previous visit (from the past 24 hour(s)). Ct Head Wo Cont    Result Date: 4/25/2017  EXAM:  CT HEAD WO CONT INDICATION:   head injury COMPARISON: None. TECHNIQUE: Unenhanced CT of the head was performed using 5 mm images. Brain and bone windows were generated. CT dose reduction was achieved through use of a standardized protocol tailored for this examination and automatic exposure control for dose modulation. FINDINGS: There is no acute intracranial hemorrhage, mass, mass effect or herniation. Ventricles and sulci show proportionate and symmetric prominence. There is periventricular white matter hypodensity. The gray-white matter differentiation is well-preserved. Atherosclerotic calcifications are seen within the carotid siphons and vertebral arteries. The mastoid air cells are well pneumatized. The visualized paranasal sinuses are normal.     IMPRESSION: No acute intracranial hemorrhage, mass or infarct. Cerebral atrophy and white matter change most compatible chronic small vessel ischemic and/or senescent change. Intracranial atherosclerosis. Preventing Falls: Care Instructions  Your Care Instructions  Getting around your home safely can be a challenge if you have injuries or health problems that make it easy for you to fall. Loose rugs and furniture in walkways are among the dangers for many older people who have problems walking or who have poor eyesight. People who have conditions such as arthritis, osteoporosis, or dementia also have to be careful not to fall. You can make your home safer with a few simple measures. Follow-up care is a key part of your treatment and safety. Be sure to make and go to all appointments, and call your doctor if you are having problems.  It's also a good idea to know your test results and keep a list of the medicines you take.  How can you care for yourself at home? Taking care of yourself  · You may get dizzy if you do not drink enough water. To prevent dehydration, drink plenty of fluids, enough so that your urine is light yellow or clear like water. Choose water and other caffeine-free clear liquids. If you have kidney, heart, or liver disease and have to limit fluids, talk with your doctor before you increase the amount of fluids you drink. · Exercise regularly to improve your strength, muscle tone, and balance. Walk if you can. Swimming may be a good choice if you cannot walk easily. · Have your vision and hearing checked each year or any time you notice a change. If you have trouble seeing and hearing, you might not be able to avoid objects and could lose your balance. · Know the side effects of the medicines you take. Ask your doctor or pharmacist whether the medicines you take can affect your balance. Sleeping pills or sedatives can affect your balance. · Limit the amount of alcohol you drink. Alcohol can impair your balance and other senses. · Ask your doctor whether calluses or corns on your feet need to be removed. If you wear loose-fitting shoes because of calluses or corns, you can lose your balance and fall. · Talk to your doctor if you have numbness in your feet. Preventing falls at home  · Remove raised doorway thresholds, throw rugs, and clutter. Repair loose carpet or raised areas in the floor. · Move furniture and electrical cords to keep them out of walking paths. · Use nonskid floor wax, and wipe up spills right away, especially on ceramic tile floors. · If you use a walker or cane, put rubber tips on it. If you use crutches, clean the bottoms of them regularly with an abrasive pad, such as steel wool. · Keep your house well lit, especially [facility], and outside walkways. Use night-lights in areas such as hallways and bathrooms.  Add extra light switches or use remote switches (such as switches that go on or off when you clap your hands) to make it easier to turn lights on if you have to get up during the night. · Install sturdy handrails on stairways. · Move items in your cabinets so that the things you use a lot are on the lower shelves (about waist level). · Keep a cordless phone and a flashlight with new batteries by your bed. If possible, put a phone in each of the main rooms of your house, or carry a cell phone in case you fall and cannot reach a phone. Or, you can wear a device around your neck or wrist. You push a button that sends a signal for help. · Wear low-heeled shoes that fit well and give your feet good support. Use footwear with nonskid soles. Check the heels and soles of your shoes for wear. Repair or replace worn heels or soles. · Do not wear socks without shoes on wood floors. · Walk on the grass when the sidewalks are slippery. If you live in an area that gets snow and ice in the winter, sprinkle salt on slippery steps and sidewalks. Preventing falls in the bath  · Install grab bars and nonskid mats inside and outside your shower or tub and near the toilet and sinks. · Use shower chairs and bath benches. · Use a hand-held shower head that will allow you to sit while showering. · Get into a tub or shower by putting the weaker leg in first. Get out of a tub or shower with your strong side first.  · Repair loose toilet seats and consider installing a raised toilet seat to make getting on and off the toilet easier. · Keep your bathroom door unlocked while you are in the shower. Where can you learn more? Go to http://suzanne-abdi.info/. Enter [id number] [id number] in the search box to learn more about \"Preventing Falls: Care Instructions. \"  Current as of: August 4, 2016  Content Version: 11.2  © 4579-6444 [employer]. Care instructions adapted under license by [employer] (which disclaims liability or warranty for this information).  If you have questions about a medical condition or this instruction, always ask your healthcare professional. [address] any warranty or liability for your use of this information. Head Injury: Care Instructions  Your Care Instructions  Most injuries to the head are minor. Bumps, cuts, and scrapes on the head and face usually heal well and can be treated the same as injuries to other parts of the body. Although it's rare, once in a while a more serious problem shows up after you are home. So it's good to be on the lookout for symptoms for a day or two. Follow-up care is a key part of your treatment and safety. Be sure to make and go to all appointments, and call your doctor if you are having problems. It's also a good idea to know your test results and keep a list of the medicines you take. How can you care for yourself at home? · Follow your doctor's instructions. He or she will tell you if you need someone to watch you closely for the next 24 hours or longer. · Take it easy for the next few days or more if you are not feeling well. · Ask your doctor when it's okay for you to go back to activities like driving a car, riding a bike, or operating machinery. When should you call for help? Call 911 anytime you think you may need emergency care. For example, call if:  · You have a seizure. · You passed out (lost consciousness). · You are confused or can't stay awake. Call your doctor now or seek immediate medical care if:  · You have new or worse vomiting. · You feel less alert. · You have new weakness or numbness in any part of your body. Watch closely for changes in your health, and be sure to contact your doctor if:  · You do not get better as expected. · You have new symptoms, such as headaches, trouble concentrating, or changes in mood. Where can you learn more? Go to http://suzanne-abdi.info/.   Enter J126 in the search box to learn more about [facility] Injury: Care Instructions. \"  Current as of: October 14, 2016  Content Version: 11.2  © 5832-7359 [employer]. Care instructions adapted under license by [employer] (which disclaims liability or warranty for this information). If you have questions about a medical condition or this instruction, always ask your healthcare professional. [place] [address] any warranty or liability for your use of this information.

## 2017-04-25 NOTE — ED NOTES
[facility] transport arrived to take pt back to nursing home; facesheet, [facility] & P, [facility] form, and pt discharged instructions sent with [facility] transport team.

## 2017-04-25 NOTE — ED TRIAGE NOTES
Pt arrives by EMS from nursing home pt had ground level fall tonight while getting up to go to bathroom; hit head on floor, no loss of consciousness. Pt denies any pain at this time. Per EMS small contusion to the back left side of head.

## 2017-06-15 ENCOUNTER — HOSPITAL ENCOUNTER (OUTPATIENT)
Dept: GENERAL RADIOLOGY | Age: 82
Discharge: HOME | End: 2017-06-15
Attending: INTERNAL MEDICINE
Payer: MEDICARE

## 2017-06-15 ENCOUNTER — OFFICE VISIT (OUTPATIENT)
Dept: INTERNAL MEDICINE CLINIC | Age: 82
End: 2017-06-15

## 2017-06-15 VITALS
HEART RATE: 73 BPM | DIASTOLIC BLOOD PRESSURE: 71 MMHG | SYSTOLIC BLOOD PRESSURE: 146 MMHG | OXYGEN SATURATION: 94 % | RESPIRATION RATE: 20 BRPM | HEIGHT: 67 IN | TEMPERATURE: 97.9 F

## 2017-06-15 DIAGNOSIS — R05.9 COUGH: ICD-10-CM

## 2017-06-15 DIAGNOSIS — R05.9 COUGH: Primary | ICD-10-CM

## 2017-06-15 DIAGNOSIS — I10 ESSENTIAL HYPERTENSION: Chronic | ICD-10-CM

## 2017-06-15 DIAGNOSIS — J01.00 ACUTE NON-RECURRENT MAXILLARY SINUSITIS: ICD-10-CM

## 2017-06-15 PROCEDURE — 71020 XR CHEST PA LAT: CPT

## 2017-06-15 RX ORDER — CODEINE PHOSPHATE AND GUAIFENESIN 10; 100 MG/5ML; MG/5ML
5 SOLUTION ORAL
COMMUNITY
End: 2017-06-15 | Stop reason: ALTCHOICE

## 2017-06-15 RX ORDER — AZITHROMYCIN 250 MG/1
250 TABLET, FILM COATED ORAL SEE ADMIN INSTRUCTIONS
Qty: 6 TAB | Refills: 0 | Status: SHIPPED | OUTPATIENT
Start: 2017-06-15 | End: 2017-06-20

## 2017-06-15 RX ORDER — GUAIFENESIN 600 MG/1
600 TABLET, EXTENDED RELEASE ORAL 2 TIMES DAILY
Qty: 60 TAB | Refills: 1 | Status: SHIPPED | OUTPATIENT
Start: 2017-06-15 | End: 2017-11-09 | Stop reason: SDUPTHER

## 2017-06-15 RX ORDER — ALBUTEROL SULFATE 0.83 MG/ML
2.5 SOLUTION RESPIRATORY (INHALATION) 2 TIMES DAILY
Qty: 24 EACH | Refills: 3 | Status: SHIPPED | OUTPATIENT
Start: 2017-06-15 | End: 2019-01-01

## 2017-06-15 RX ORDER — FLUTICASONE PROPIONATE 50 MCG
2 SPRAY, SUSPENSION (ML) NASAL DAILY
Qty: 1 BOTTLE | Refills: 3 | Status: SHIPPED | OUTPATIENT
Start: 2017-06-15 | End: 2019-01-01

## 2017-06-15 NOTE — PROGRESS NOTES
Chief Complaint   Patient presents with    Cough     sinusitis  Presents with nasal blockage, post nasal drip and bilateral sinus pain for 2 weeks. Denies shortness of breath, chest pain, abdominal pain, nausea, vomiting,  sneezing and night sweats. Symptoms are moderate. Recent treatment for similar symptoms? None. Has tried over-the-counter remedies guaifenison with mild and paritial relief of symptoms. Contacts with similar infections: yes. Asthma?:  no.   reports that she quit smoking about 47 years ago. She smoked 0.50 packs per day. She has never used smokeless tobacco.     Cough  Pt's daughtes present at visit. [facility] from [facility] reviewed with them. She is getting albuterol nebs prn so may not be receiving. No fevers, no night sweats. She is not able to bring up sputum but daughter reports yellow or yellow white at times. She denies sob or wheezing. Subjective:   Pita Pineda is a 80 y.o. female with hypertension. Hypertension ROS: taking medications as instructed, no medication side effects noted, no TIA's, no chest pain on exertion, no dyspnea on exertion, no swelling of ankles. New concerns: none.                Past Medical History:   Diagnosis Date    Arthritis     Dr. Leonela [facility])     colon CA in situ    Depression     Hematoma (nontraumatic) of breast     Dr. Daniella Ghosh [facility] breast surgeon    High cholesterol     Hypertension     Osteoporosis     Dr. Abel Jaffe fall fractured vertebrae    Psychiatric disorder     depression    Umbilical hernia      Past Surgical History:   Procedure Laterality Date    HX COLECTOMY  1985    HX HEENT      cateracts bilateral    HX HIP REPLACEMENT  2009    Right    HX ORTHOPAEDIC  2012    T11 & L1 vertebrae fx    HX PELVIC FRACTURE TX  2010     Social History     Social History    Marital status:      Spouse name: N/A    Number of children: N/A    Years of education: N/A     Social History Main Topics    Smoking status: Former Smoker     Packs/day: 0.50     Quit date: 1/1/1970    Smokeless tobacco: Never Used    Alcohol use Yes      Comment: occasional    Drug use: No    Sexual activity: No     Other Topics Concern    None     Social History Narrative    [facility]    Can cook if she wants        3 children    2 daughters and 1 son        No smoke        Drink- wine, or bourbon or vodka 1 ounce     Family History   Problem Relation Age of Onset    Heart Disease Mother      Current Outpatient Prescriptions   Medication Sig Dispense Refill    guaiFENesin-codeine (ROBITUSSIN AC) 100-10 mg/5 mL solution Take 5 mL by mouth three (3) times daily as needed for Cough.  traMADol (ULTRAM) 50 mg tablet Take 1 Tab by mouth every six (6) hours as needed for Pain. Max Daily Amount: 200 mg. 30 Tab 0    fluticasone (FLONASE) 50 mcg/actuation nasal spray 2 Sprays by Both Nostrils route daily.  therapeutic multivitamin (THERAGRAN) tablet Take 1 Tab by mouth daily.  acetaminophen (TYLENOL) 500 mg tablet Take 1,000 mg by mouth every four (4) hours as needed for Pain.  bismuth subsalicylate (BISMATROL) 262 mg/15 mL suspension Take 30 mL by mouth as needed (After each loose stool).  albuterol (PROVENTIL, VENTOLIN) 2 mg/5 mL syrup Take 5 mL by mouth four (4) times daily as needed. Please give pt if she is coughing. If not improving need rx and nebulizer. 473 mL 2    escitalopram oxalate (LEXAPRO) 10 mg tablet Take 1 Tab by mouth daily. 90 Tab 1    Cholecalciferol, Vitamin D3, 1,000 unit cap Take 1 capsule by mouth daily 90 Cap 1    albuterol (PROVENTIL HFA, VENTOLIN HFA, PROAIR HFA) 90 mcg/actuation inhaler Take 2 puffs by inhalation every four (4) hours as needed for Wheezing or Shortness of Breath.  (Patient taking differently: Take 1 Puff by inhalation every six (6) hours as needed for Wheezing or Shortness of Breath.) 1 Inhaler 0    guaiFENesin 200 mg/5 mL liqd Take 5 mL by mouth three (3) times daily. (Patient taking differently: Take 10 mL by mouth three (3) times daily.) 118 mL 0     Allergies   Allergen Reactions    Latex Unknown (comments)     Pt unable to report      Codeine Unknown (comments)    Sulfa Dyne Other (comments)     \"Crying jag\"       Review of Systems - General ROS: positive for  - fatigue, malaise and sleep disturbance  negative for - chills or fever  Cardiovascular ROS: no chest pain or dyspnea on exertion  Respiratory ROS: positive for - cough  negative for - hemoptysis, orthopnea, pleuritic pain, shortness of breath, sputum changes, stridor or tachypnea    Visit Vitals    /71 (BP 1 Location: Left arm, BP Patient Position: Sitting)    Pulse 73    Temp 97.9 °F (36.6 °C) (Oral)    Resp 20    Ht 5' 7\" (1.702 m)    SpO2 94%     General Appearance:  Well developed, well nourished,alert and oriented x 3, and individual in no acute distress. Ears/Nose/Mouth/Throat:   Hearing grossly normal.right maxillary pain on palpation, right nasal turbinate enlargement, boggy         Neck: Supple, no lad, no bruits   Chest:   Lungs crackles at right base   Cardiovascular:  Regular rate and rhythm, S1, S2 normal, no murmur. Abdomen:   Soft, non-tender, bowel sounds are active. Extremities: No edema bilaterally. Skin: Warm and dry, no suspicious lesions                 Neal Tomas was seen today for cough. Diagnoses and all orders for this visit:    Cough  I think this is coming from her sinus/pnd  Will rexray due to crackles and risk for pneumonia  -     XR CHEST PA LAT; Future  -     albuterol (PROVENTIL VENTOLIN) 2.5 mg /3 mL (0.083 %) nebulizer solution; 3 mL by Nebulization route two (2) times a day. -     guaiFENesin ER (MUCINEX) 600 mg ER tablet; Take 1 Tab by mouth two (2) times a day. Essential hypertension  Cont meds  Elevated due to illness    Acute non-recurrent maxillary sinusitis  -     azithromycin (ZITHROMAX) 250 mg tablet;  Take 1 Tab by mouth See Admin Instructions for 5 days. -     fluticasone (FLONASE) 50 mcg/actuation nasal spray; 2 Sprays by Both Nostrils route daily. I spent 25 min with this patient and >50% of the time was spent on counseling and management of cough with likely underlying sinus infection possible, discussed with daughters and scheduled treatments. We can changed nebs to prn once improved. They agree. This note will not be viewable in [address]

## 2017-06-15 NOTE — MR AVS SNAPSHOT
Visit Information Date & Time Provider Department Dept. Phone Encounter #  
 6/15/2017 10:45 AM Yeny Parekh MD [facility] [address] [id number] Upcoming Health Maintenance Date Due ZOSTER VACCINE AGE 60> 11/18/1984 GLAUCOMA SCREENING Q2Y 11/18/1989 MEDICARE YEARLY EXAM 7/15/2017 INFLUENZA AGE 9 TO ADULT 8/1/2017 Pneumococcal 65+ High/Highest Risk (2 of 2 - PPSV23) 11/1/2017 DTaP/Tdap/Td series (2 - Td) 4/28/2024 Allergies as of 6/15/2017  Review Complete On: 6/15/2017 By: Yeny Parekh MD  
  
 Severity Noted Reaction Type Reactions Latex  08/26/2012    Unknown (comments) Pt unable to report Codeine  08/26/2012    Unknown (comments) Sulfa Dyne  08/26/2012    Other (comments) \"Crying jag\" Current Immunizations  Reviewed on 10/14/2016 Name Date Influenza High Dose Vaccine PF 10/4/2016 Influenza Vaccine 10/1/2014, 11/1/2012 Pneumococcal Vaccine (Unspecified Type) 11/1/2012 Tdap 4/28/2014  2:44 AM  
  
 Not reviewed this visit You Were Diagnosed With   
  
 Codes Comments Cough    -  Primary ICD-10-CM: X91 ICD-9-CM: 786.2 Essential hypertension     ICD-10-CM: I10 
ICD-9-CM: 401.9 Vitals BP Pulse Temp Resp Height(growth percentile) SpO2  
 146/71 (BP 1 Location: Left arm, BP Patient Position: Sitting) 73 97.9 °F (36.6 °C) (Oral) 20 5' 7\" (1.702 m) 94% OB Status Smoking Status Postmenopausal Former Smoker Vitals History Preferred Pharmacy Pharmacy Name Phone [facility] [phone] Your Updated Medication List  
  
   
This list is accurate as of: 6/15/17 12:14 PM.  Always use your most recent med list.  
  
  
  
  
 acetaminophen 500 mg tablet Commonly known as:  TYLENOL Take 1,000 mg by mouth every four (4) hours as needed for Pain. * albuterol 2 mg/5 mL syrup Commonly known as:  Dejon Godwin Take 5 mL by mouth four (4) times daily as needed. Please give pt if she is coughing. If not improving need rx and nebulizer. * albuterol 2.5 mg /3 mL (0.083 %) nebulizer solution Commonly known as:  PROVENTIL VENTOLIN  
3 mL by Nebulization route two (2) times a day. azithromycin 250 mg tablet Commonly known as:  Ara Letters Take 1 Tab by mouth See Admin Instructions for 5 days. BISMATROL 262 mg/15 mL suspension Generic drug:  bismuth subsalicylate Take 30 mL by mouth as needed (After each loose stool). cholecalciferol 1,000 unit Cap Commonly known as:  VITAMIN D3 Take 1 capsule by mouth daily  
  
 escitalopram oxalate 10 mg tablet Commonly known as:  Aliene Apo Take 1 Tab by mouth daily. fluticasone 50 mcg/actuation nasal spray Commonly known as:  Teresa Fulton 2 Sprays by Both Nostrils route daily. guaiFENesin  mg ER tablet Commonly known as:  Samy & Samy Take 1 Tab by mouth two (2) times a day. therapeutic multivitamin tablet Commonly known as:  [facility] Take 1 Tab by mouth daily. traMADol 50 mg tablet Commonly known as:  ULTRAM  
Take 1 Tab by mouth every six (6) hours as needed for Pain. Max Daily Amount: 200 mg.  
  
 * Notice: This list has 2 medication(s) that are the same as other medications prescribed for you. Read the directions carefully, and ask your doctor or other care provider to review them with you. Prescriptions Printed Refills  
 azithromycin (ZITHROMAX) 250 mg tablet 0 Sig: Take 1 Tab by mouth See Admin Instructions for 5 days. Class: Print Route: Oral  
 albuterol (PROVENTIL VENTOLIN) 2.5 mg /3 mL (0.083 %) nebulizer solution 3 Sig: 3 mL by Nebulization route two (2) times a day. Class: Print Route: Nebulization  
 guaiFENesin ER (MUCINEX) 600 mg ER tablet 1 Sig: Take 1 Tab by mouth two (2) times a day. Class: Print Route: Oral  
 fluticasone (FLONASE) 50 mcg/actuation nasal spray 3 Si Sprays by Both Nostrils route daily. Class: Print Route: Both Nostrils To-Do List   
 06/15/2017 Imaging:  XR CHEST PA LAT Introducing [facility]! Loyd Sapp introduces [employer] patient portal. Now you can access parts of your medical record, email your doctor's office, and request medication refills online. 1. In your internet browser, go to [email or web address] 2. Click on the First Time User? Click Here link in the Sign In box. You will see the New Member Sign Up page. 3. Enter your [employer] Access Code exactly as it appears below. You will not need to use this code after youve completed the sign-up process. If you do not sign up before the expiration date, you must request a new code. · [employer] Access Code: [email or web address] Expires: 2017 12:14 PM 
 
4. Enter the last four digits of your Social Security Number (xxxx) and Date of Birth (mm/dd/yyyy) as indicated and click Submit. You will be taken to the next sign-up page. 5. Create a [employer] ID. This will be your [employer] login ID and cannot be changed, so think of one that is secure and easy to remember. 6. Create a [employer] password. You can change your password at any time. 7. Enter your Password Reset Question and Answer. This can be used at a later time if you forget your password. 8. Enter your e-mail address. You will receive e-mail notification when new information is available in [address] 9. Click Sign Up. You can now view and download portions of your medical record. 10. Click the Download Summary menu link to download a portable copy of your medical information. If you have questions, please visit the Frequently Asked Questions section of the [employer] website. Remember, [employer] is NOT to be used for urgent needs. For medical emergencies, dial 911. Now available from your iPhone and Android! Please provide this summary of care documentation to your next provider. Your primary care clinician is listed as Oliver Ortiz. If you have any questions after today's visit, please call [phone].

## 2017-06-19 ENCOUNTER — TELEPHONE (OUTPATIENT)
Dept: INTERNAL MEDICINE CLINIC | Age: 82
End: 2017-06-19

## 2017-06-19 NOTE — TELEPHONE ENCOUNTER
----- Message from Durward Sandhoff sent at 6/19/2017  3:50 PM EDT -----  Regarding: Dr. Toby Walters Pt's daughter would like results from Xray on 06/15/17. Best contact is [phone].

## 2017-07-08 ENCOUNTER — APPOINTMENT (OUTPATIENT)
Dept: CT IMAGING | Age: 82
End: 2017-07-08
Attending: EMERGENCY MEDICINE
Payer: MEDICARE

## 2017-07-08 ENCOUNTER — APPOINTMENT (OUTPATIENT)
Dept: GENERAL RADIOLOGY | Age: 82
End: 2017-07-08
Attending: EMERGENCY MEDICINE
Payer: MEDICARE

## 2017-07-08 ENCOUNTER — HOSPITAL ENCOUNTER (EMERGENCY)
Age: 82
Discharge: HOME | End: 2017-07-08
Attending: EMERGENCY MEDICINE | Admitting: EMERGENCY MEDICINE
Payer: MEDICARE

## 2017-07-08 VITALS
RESPIRATION RATE: 27 BRPM | HEIGHT: 67 IN | HEART RATE: 88 BPM | DIASTOLIC BLOOD PRESSURE: 68 MMHG | SYSTOLIC BLOOD PRESSURE: 144 MMHG | WEIGHT: 155 LBS | BODY MASS INDEX: 24.33 KG/M2 | TEMPERATURE: 98.3 F | OXYGEN SATURATION: 97 %

## 2017-07-08 DIAGNOSIS — I48.0 PAROXYSMAL ATRIAL FIBRILLATION (HCC): Primary | ICD-10-CM

## 2017-07-08 DIAGNOSIS — M25.511 ACUTE PAIN OF RIGHT SHOULDER: ICD-10-CM

## 2017-07-08 LAB
ALBUMIN SERPL BCP-MCNC: 3.3 G/DL (ref 3.5–5)
ALBUMIN/GLOB SERPL: 0.8 {RATIO} (ref 1.1–2.2)
ALP SERPL-CCNC: 79 U/L (ref 45–117)
ALT SERPL-CCNC: 20 U/L (ref 12–78)
ANION GAP BLD CALC-SCNC: 6 MMOL/L (ref 5–15)
APPEARANCE UR: ABNORMAL
AST SERPL W P-5'-P-CCNC: 27 U/L (ref 15–37)
BACTERIA URNS QL MICRO: NEGATIVE /HPF
BASOPHILS # BLD AUTO: 0 K/UL (ref 0–0.1)
BASOPHILS # BLD: 0 % (ref 0–1)
BILIRUB SERPL-MCNC: 0.4 MG/DL (ref 0.2–1)
BILIRUB UR QL: NEGATIVE
BUN SERPL-MCNC: 14 MG/DL (ref 6–20)
BUN/CREAT SERPL: 17 (ref 12–20)
CALCIUM SERPL-MCNC: 8.8 MG/DL (ref 8.5–10.1)
CHLORIDE SERPL-SCNC: 100 MMOL/L (ref 97–108)
CO2 SERPL-SCNC: 30 MMOL/L (ref 21–32)
COLOR UR: ABNORMAL
CREAT SERPL-MCNC: 0.83 MG/DL (ref 0.55–1.02)
D DIMER PPP FEU-MCNC: 0.95 MG/L FEU (ref 0–0.65)
EOSINOPHIL # BLD: 0.1 K/UL (ref 0–0.4)
EOSINOPHIL NFR BLD: 1 % (ref 0–7)
EPITH CASTS URNS QL MICRO: ABNORMAL /LPF
ERYTHROCYTE [DISTWIDTH] IN BLOOD BY AUTOMATED COUNT: 14.9 % (ref 11.5–14.5)
GLOBULIN SER CALC-MCNC: 3.9 G/DL (ref 2–4)
GLUCOSE SERPL-MCNC: 130 MG/DL (ref 65–100)
GLUCOSE UR STRIP.AUTO-MCNC: NEGATIVE MG/DL
HCT VFR BLD AUTO: 39.1 % (ref 35–47)
HGB BLD-MCNC: 12.8 G/DL (ref 11.5–16)
HGB UR QL STRIP: NEGATIVE
HYALINE CASTS URNS QL MICRO: ABNORMAL /LPF (ref 0–5)
KETONES UR QL STRIP.AUTO: NEGATIVE MG/DL
LEUKOCYTE ESTERASE UR QL STRIP.AUTO: ABNORMAL
LYMPHOCYTES # BLD AUTO: 10 % (ref 12–49)
LYMPHOCYTES # BLD: 1.5 K/UL (ref 0.8–3.5)
MCH RBC QN AUTO: 29.2 PG (ref 26–34)
MCHC RBC AUTO-ENTMCNC: 32.7 G/DL (ref 30–36.5)
MCV RBC AUTO: 89.3 FL (ref 80–99)
MONOCYTES # BLD: 1.6 K/UL (ref 0–1)
MONOCYTES NFR BLD AUTO: 10 % (ref 5–13)
NEUTS SEG # BLD: 11.9 K/UL (ref 1.8–8)
NEUTS SEG NFR BLD AUTO: 79 % (ref 32–75)
NITRITE UR QL STRIP.AUTO: NEGATIVE
PH UR STRIP: 5.5 [PH] (ref 5–8)
PLATELET # BLD AUTO: 276 K/UL (ref 150–400)
POTASSIUM SERPL-SCNC: 4.4 MMOL/L (ref 3.5–5.1)
PROT SERPL-MCNC: 7.2 G/DL (ref 6.4–8.2)
PROT UR STRIP-MCNC: NEGATIVE MG/DL
RBC # BLD AUTO: 4.38 M/UL (ref 3.8–5.2)
RBC #/AREA URNS HPF: ABNORMAL /HPF (ref 0–5)
SODIUM SERPL-SCNC: 136 MMOL/L (ref 136–145)
SP GR UR REFRACTOMETRY: 1.02 (ref 1–1.03)
TROPONIN I SERPL-MCNC: <0.04 NG/ML
UA: UC IF INDICATED,UAUC: ABNORMAL
UROBILINOGEN UR QL STRIP.AUTO: 0.2 EU/DL (ref 0.2–1)
WBC # BLD AUTO: 15.1 K/UL (ref 3.6–11)
WBC URNS QL MICRO: ABNORMAL /HPF (ref 0–4)

## 2017-07-08 PROCEDURE — 80053 COMPREHEN METABOLIC PANEL: CPT | Performed by: EMERGENCY MEDICINE

## 2017-07-08 PROCEDURE — 81001 URINALYSIS AUTO W/SCOPE: CPT | Performed by: EMERGENCY MEDICINE

## 2017-07-08 PROCEDURE — 74011636320 HC RX REV CODE- 636/320: Performed by: RADIOLOGY

## 2017-07-08 PROCEDURE — 73030 X-RAY EXAM OF SHOULDER: CPT

## 2017-07-08 PROCEDURE — 87086 URINE CULTURE/COLONY COUNT: CPT | Performed by: EMERGENCY MEDICINE

## 2017-07-08 PROCEDURE — 84484 ASSAY OF TROPONIN QUANT: CPT | Performed by: EMERGENCY MEDICINE

## 2017-07-08 PROCEDURE — 85025 COMPLETE CBC W/AUTO DIFF WBC: CPT | Performed by: EMERGENCY MEDICINE

## 2017-07-08 PROCEDURE — 71020 XR CHEST PA LAT: CPT

## 2017-07-08 PROCEDURE — 85379 FIBRIN DEGRADATION QUANT: CPT | Performed by: EMERGENCY MEDICINE

## 2017-07-08 PROCEDURE — 93005 ELECTROCARDIOGRAM TRACING: CPT

## 2017-07-08 PROCEDURE — 36415 COLL VENOUS BLD VENIPUNCTURE: CPT | Performed by: EMERGENCY MEDICINE

## 2017-07-08 PROCEDURE — 99285 EMERGENCY DEPT VISIT HI MDM: CPT

## 2017-07-08 PROCEDURE — 71275 CT ANGIOGRAPHY CHEST: CPT

## 2017-07-08 RX ORDER — TRAMADOL HYDROCHLORIDE 50 MG/1
50 TABLET ORAL
Qty: 20 TAB | Refills: 0 | Status: SHIPPED | OUTPATIENT
Start: 2017-07-08 | End: 2017-08-15 | Stop reason: SDUPTHER

## 2017-07-08 RX ORDER — SODIUM CHLORIDE 0.9 % (FLUSH) 0.9 %
5-10 SYRINGE (ML) INJECTION EVERY 8 HOURS
Status: DISCONTINUED | OUTPATIENT
Start: 2017-07-08 | End: 2017-07-08 | Stop reason: HOSPADM

## 2017-07-08 RX ORDER — SODIUM CHLORIDE 0.9 % (FLUSH) 0.9 %
5-10 SYRINGE (ML) INJECTION AS NEEDED
Status: DISCONTINUED | OUTPATIENT
Start: 2017-07-08 | End: 2017-07-08 | Stop reason: HOSPADM

## 2017-07-08 RX ADMIN — IOPAMIDOL 80 ML: 755 INJECTION, SOLUTION INTRAVENOUS at 16:06

## 2017-07-08 NOTE — ED TRIAGE NOTES
Patient arrives via ems from her assisted living facility. About an hour and a half ago she c/o SOB and right shoulder pain and acting different than usual, always disoriented to place and time per facility. Patient arrives on non rebreather from EMs.

## 2017-07-08 NOTE — DISCHARGE INSTRUCTIONS
Atrial Fibrillation: Care Instructions  Your Care Instructions    Atrial fibrillation is an irregular and often fast heartbeat. Treating this condition is important for several reasons. It can cause blood clots, which can travel from your heart to your brain and cause a stroke. If you have a fast heartbeat, you may feel lightheaded, dizzy, and weak. An irregular heartbeat can also increase your risk for heart failure. Atrial fibrillation is often the result of another heart condition, such as high blood pressure or coronary artery disease. Making changes to improve your heart condition will help you stay healthy and active. Follow-up care is a key part of your treatment and safety. Be sure to make and go to all appointments, and call your doctor if you are having problems. It's also a good idea to know your test results and keep a list of the medicines you take. How can you care for yourself at home? Medicines  · Take your medicines exactly as prescribed. Call your doctor if you think you are having a problem with your medicine. You will get more details on the specific medicines your doctor prescribes. · If your doctor has given you a blood thinner to prevent a stroke, be sure you get instructions about how to take your medicine safely. Blood thinners can cause serious bleeding problems. · Do not take any vitamins, over-the-counter drugs, or herbal products without talking to your doctor first.  Lifestyle changes  · Do not smoke. Smoking can increase your chance of a stroke and heart attack. If you need help quitting, talk to your doctor about stop-smoking programs and medicines. These can increase your chances of quitting for good. · Eat a heart-healthy diet. · Stay at a healthy weight. Lose weight if you need to. · Limit alcohol to 2 drinks a day for men and 1 drink a day for women. Too much alcohol can cause health problems. · Avoid colds and flu. Get a pneumococcal vaccine shot.  If you have had one before, ask your doctor whether you need another dose. Get a flu shot every year. If you must be around people with colds or flu, wash your hands often. Activity  · If your doctor recommends it, get more exercise. Walking is a good choice. Bit by bit, increase the amount you walk every day. Try for at least 30 minutes on most days of the week. You also may want to swim, bike, or do other activities. Your doctor may suggest that you join a cardiac rehabilitation program so that you can have help increasing your physical activity safely. · Start light exercise if your doctor says it is okay. Even a small amount will help you get stronger, have more energy, and manage stress. Walking is an easy way to get exercise. Start out by walking a little more than you did in the hospital. Gradually increase the amount you walk. · When you exercise, watch for signs that your heart is working too hard. You are pushing too hard if you cannot talk while you are exercising. If you become short of breath or dizzy or have chest pain, sit down and rest immediately. · Check your pulse regularly. Place two fingers on the artery at the palm side of your wrist, in line with your thumb. If your heartbeat seems uneven or fast, talk to your doctor. When should you call for help? Call 911 anytime you think you may need emergency care. For example, call if:  · You have symptoms of a heart attack. These may include:  ¨ Chest pain or pressure, or a strange feeling in the chest.  ¨ Sweating. ¨ Shortness of breath. ¨ Nausea or vomiting. ¨ Pain, pressure, or a strange feeling in the back, neck, jaw, or upper belly or in one or both shoulders or arms. ¨ Lightheadedness or sudden weakness. ¨ A fast or irregular heartbeat. After you call 911, the  may tell you to chew 1 adult-strength or 2 to 4 low-dose aspirin. Wait for an ambulance. Do not try to drive yourself. · You have symptoms of a stroke.  These may include:  ¨ Sudden numbness, tingling, weakness, or loss of movement in your face, arm, or leg, especially on only one side of your body. ¨ Sudden vision changes. ¨ Sudden trouble speaking. ¨ Sudden confusion or trouble understanding simple statements. ¨ Sudden problems with walking or balance. ¨ A sudden, severe headache that is different from past headaches. · You passed out (lost consciousness). Call your doctor now or seek immediate medical care if:  · You have new or increased shortness of breath. · You feel dizzy or lightheaded, or you feel like you may faint. · Your heart rate becomes irregular. · You can feel your heart flutter in your chest or skip heartbeats. Tell your doctor if these symptoms are new or worse. Watch closely for changes in your health, and be sure to contact your doctor if you have any problems. Where can you learn more? Go to http://suzanne-abdi.info/. Enter U020 in the search box to learn more about \"Atrial Fibrillation: Care Instructions. \"  Current as of: September 21, 2016  Content Version: 11.3  © 4702-7550 [employer]. Care instructions adapted under license by [employer] (which disclaims liability or warranty for this information). If you have questions about a medical condition or this instruction, always ask your healthcare professional. [address] any warranty or liability for your use of this information. We hope that we have addressed all of your medical concerns. The examination and treatment you received in the Emergency Department were for an emergent problem and were not intended as complete care. It is important that you follow up with your healthcare provider(s) for ongoing care. If your symptoms worsen or do not improve as expected, and you are unable to reach your usual health care provider(s), you should return to the Emergency Department.       Today's healthcare is undergoing tremendous change, and patient satisfaction surveys are one of the many tools to assess the quality of medical care. You may receive a survey from the [employer] regarding your experience in the Emergency Department. I hope that your experience has been completely positive, particularly the medical care that I provided. As such, please participate in the survey; anything less than excellent does not meet my expectations or intentions. [facility] and [facility] participate in nationally recognized quality of care measures. If your blood pressure is greater than 120/80, as reported below, we urge that you seek medical care to address the potential of high blood pressure, commonly known as hypertension. Hypertension can be hereditary or can be caused by certain medical conditions, pain, stress, or \"white coat syndrome. \"       Please make an appointment with your health care provider(s) for follow up of your Emergency Department visit. VITALS:   Patient Vitals for the past 8 hrs:   Temp Pulse Resp BP SpO2   07/08/17 1608 - 91 27 - 97 %   07/08/17 1546 - 87 24 154/63 95 %   07/08/17 1532 - 83 17 (!) 132/99 93 %   07/08/17 1521 - 85 18 166/90 94 %   07/08/17 1514 - 81 19 - 96 %   07/08/17 1508 - 86 23 - 97 %   07/08/17 1434 - 85 20 164/90 94 %   07/08/17 1431 98.3 °F (36.8 °C) - - - -   07/08/17 1427 - 80 16 - 96 %   07/08/17 1426 - 86 17 166/64 94 %   07/08/17 1424 - - - 166/64 -          Thank you for allowing us to provide you with medical care today. We realize that you have many choices for your emergency care needs. Please choose us in the future for any continued health care needs. Regards,           Fazal Acosta, [address] [place], [place] [address].   Office: [phone]            Recent Results (from the past 24 hour(s))   METABOLIC PANEL, COMPREHENSIVE    Collection Time: 07/08/17  2:41 PM   Result Value Ref Range    Sodium 136 136 - 145 mmol/L Potassium 4.4 3.5 - 5.1 mmol/L    Chloride 100 97 - 108 mmol/L    CO2 30 21 - 32 mmol/L    Anion gap 6 5 - 15 mmol/L    Glucose 130 (H) 65 - 100 mg/dL    BUN 14 6 - 20 MG/DL    Creatinine 0.83 0.55 - 1.02 MG/DL    BUN/Creatinine ratio 17 12 - 20      GFR est AA >60 >60 ml/min/1.73m2    GFR est non-AA >60 >60 ml/min/1.73m2    Calcium 8.8 8.5 - 10.1 MG/DL    Bilirubin, total 0.4 0.2 - 1.0 MG/DL    ALT (SGPT) 20 12 - 78 U/L    AST (SGOT) 27 15 - 37 U/L    Alk. phosphatase 79 45 - 117 U/L    Protein, total 7.2 6.4 - 8.2 g/dL    Albumin 3.3 (L) 3.5 - 5.0 g/dL    Globulin 3.9 2.0 - 4.0 g/dL    A-G Ratio 0.8 (L) 1.1 - 2.2     CBC WITH AUTOMATED DIFF    Collection Time: 07/08/17  2:41 PM   Result Value Ref Range    WBC 15.1 (H) 3.6 - 11.0 K/uL    RBC 4.38 3.80 - 5.20 M/uL    HGB 12.8 11.5 - 16.0 g/dL    HCT 39.1 35.0 - 47.0 %    MCV 89.3 80.0 - 99.0 FL    MCH 29.2 26.0 - 34.0 PG    MCHC 32.7 30.0 - 36.5 g/dL    RDW 14.9 (H) 11.5 - 14.5 %    PLATELET 826 579 - 612 K/uL    NEUTROPHILS 79 (H) 32 - 75 %    LYMPHOCYTES 10 (L) 12 - 49 %    MONOCYTES 10 5 - 13 %    EOSINOPHILS 1 0 - 7 %    BASOPHILS 0 0 - 1 %    ABS. NEUTROPHILS 11.9 (H) 1.8 - 8.0 K/UL    ABS. LYMPHOCYTES 1.5 0.8 - 3.5 K/UL    ABS. MONOCYTES 1.6 (H) 0.0 - 1.0 K/UL    ABS. EOSINOPHILS 0.1 0.0 - 0.4 K/UL    ABS.  BASOPHILS 0.0 0.0 - 0.1 K/UL   D DIMER    Collection Time: 07/08/17  2:41 PM   Result Value Ref Range    D-dimer 0.95 (H) 0.00 - 0.65 mg/L FEU   TROPONIN I    Collection Time: 07/08/17  2:41 PM   Result Value Ref Range    Troponin-I, Qt. <0.04 <0.05 ng/mL   URINALYSIS W/ REFLEX CULTURE    Collection Time: 07/08/17  4:12 PM   Result Value Ref Range    Color YELLOW/STRAW      Appearance CLOUDY (A) CLEAR      Specific gravity 1.021 1.003 - 1.030      pH (UA) 5.5 5.0 - 8.0      Protein NEGATIVE  NEG mg/dL    Glucose NEGATIVE  NEG mg/dL    Ketone NEGATIVE  NEG mg/dL    Bilirubin NEGATIVE  NEG      Blood NEGATIVE  NEG      Urobilinogen 0.2 0.2 - 1.0 EU/dL Nitrites NEGATIVE  NEG      Leukocyte Esterase SMALL (A) NEG      WBC 20-50 0 - 4 /hpf    RBC 0-5 0 - 5 /hpf    Epithelial cells MODERATE (A) FEW /lpf    Bacteria NEGATIVE  NEG /hpf    UA:UC IF INDICATED URINE CULTURE ORDERED (A) CNI      Hyaline cast 2-5 0 - 5 /lpf       Xr Chest Pa Lat    Result Date: 7/8/2017  INDICATION: Shortness of breath and right shoulder pain today COMPARISON: Maranda 15, 2017 FINDINGS: PA and lateral views of the chest demonstrate a stable cardiomediastinal silhouette. There is unchanged elevation of the right hemidiaphragm. No new airspace disease or pleural effusion is evident. Old thoracic lumbar compression fractures are unchanged. IMPRESSION: No acute process. Xr Shoulder Rt Ap/lat Min 2 V    Result Date: 7/8/2017  EXAM:  XR SHOULDER RT AP/LAT MIN 2 V INDICATION:   right shoulder pain. unknown injury. COMPARISON: October 31, 2016. FINDINGS: 2 views of the right shoulder demonstrate no fracture, dislocation or other acute abnormality. Chronic narrowing of the subacromial space is again noted. IMPRESSION:  No acute abnormality. Cta Chest W Or W Wo Cont    Result Date: 7/8/2017  CLINICAL HISTORY: Right-sided shoulder pain, dyspnea INDICATION: Right shoulder pain and dyspnea COMPARISON: 2/20/2017 TECHNIQUE: CT of the chest with  IV contrast , Isovue-370 is performed. Axial images from the thoracic inlet to the level of the upper abdomen are obtained. Manual post-processing of the images and coronal reformatting is also performed. CT dose reduction was achieved through use of a standardized protocol tailored for this examination and automatic exposure control for dose modulation. Multiplanar reformatted imaging was performed. Sagittal and coronal reformatting. 3-D Postprocessing of imaging was performed. 3-D MIP reconstructed images were obtained in the coronal plane. FINDINGS: Motion artifact slightly limited study. There is no proximal pulmonary embolism identified.  There is chronic elevation of the right hemidiaphragm. There is no mediastinal, axillary or hilar lymphadenopathy. There is no pleural or pericardial effusion. The aorta is normal in course and caliber. The proximal pulmonary arteries are without evidence of filling defects, the caliber of the pulmonary arteries is also normal. The pulmonary parenchyma is unremarkable. No lytic or blastic lesions are identified. Chronic compression deformities at T4, T11, T12 no change. Cholelithiasis. Right renal hypodensity unchanged. The remainder of the upper abdomen visualized is unremarkable. IMPRESSION: No proximal pulmonary embolism. No aortic aneurysm or dissection. Chronic thoracic compression deformities.

## 2017-07-08 NOTE — ED NOTES
Bedside and Verbal shift change report given to Mao (oncoming nurse) by Kade Ceron RN (offgoing nurse). Report included the following information SBAR, ED Summary and Recent Results.

## 2017-07-08 NOTE — ED PROVIDER NOTES
HPI Comments: 80 y.o. female with past medical history significant for colon CA in situ, elevated cholesterol, HTN, osteoporosis, umbilical hernia, right hip replacement, colectomy, pelvic fx who presents via EMS from [address] with chief complaint of shoulder pain. Patient is very difficulty historian. History is provided by EMS. EMS notes they received call from nursing facility for patient right shoulder pain and SOB. Unknown if any occurrence of injury. Patient was placed on O2 via NRB upon arrival. In ED, patient complains of some difficulty breathing and says \"ow\" on palpation of right shoulder. Patient's O2 sats are ~98%. Patient is hard of hearing. EMS notes patient has baseline dementia and is usually oriented to person only. Per EMS, patient's daughter is on her way to ED. EMS deny any patient fever or vomiting. There are no other acute medical concerns at this time. Social hx: former smoker; quit date: 1/1/1970; 0.5 packs per day. +ETOH use; occasional.     PCP: Yazan Wilson MD    Note written by Kvng Kessler. Corky Jamison, as dictated by Chyna Rodriguez MD 2:32 PM    Full history, physical exam, and ROS unable to be obtained due to:  dementia. The history is provided by the EMS personnel. No  was used.         Past Medical History:   Diagnosis Date    Arthritis     Dr. Avilez [facility])     colon CA in situ    Depression     Hematoma (nontraumatic) of breast     Dr. Naina Hunt freeman breast surgeon    High cholesterol     Hypertension     Osteoporosis     Dr. Nohemy Montano fall fractured vertebrae    Psychiatric disorder     depression    Umbilical hernia        Past Surgical History:   Procedure Laterality Date    HX COLECTOMY  1985    HX HEENT      cateracts bilateral    HX HIP REPLACEMENT  2009    Right    HX ORTHOPAEDIC  2012    T11 & L1 vertebrae fx    HX PELVIC FRACTURE TX  2010         Family History:   Problem Relation Age of Onset  Heart Disease Mother        Social History     Social History    Marital status:      Spouse name: N/A    Number of children: N/A    Years of education: N/A     Occupational History    Not on file. Social History Main Topics    Smoking status: Former Smoker     Packs/day: 0.50     Quit date: 1/1/1970    Smokeless tobacco: Never Used    Alcohol use Yes      Comment: occasional    Drug use: No    Sexual activity: No     Other Topics Concern    Not on file     Social History Narrative    [employer] [facility]    Can cook if she wants        3 children    2 daughters and 1 son        No smoke        Drink- wine, or bourbon or vodka 1 ounce         ALLERGIES: Latex; Codeine; and Sulfa dyne    Review of Systems   Unable to perform ROS: Dementia       Vitals:    07/08/17 1521 07/08/17 1532 07/08/17 1546 07/08/17 1608   BP: 166/90 (!) 132/99 154/63    Pulse: 85 83 87 91   Resp: 18 17 24 27   Temp:       SpO2: 94% 93% 95% 97%   Weight:       Height:                Physical Exam   Constitutional: She is oriented to person, place, and time. She appears well-developed and well-nourished. No distress. Hard of hearing. HENT:   Head: Normocephalic and atraumatic. Right Ear: External ear normal.   Left Ear: External ear normal.   Nose: Nose normal.   Mouth/Throat: Oropharynx is clear and moist.   Normal exam   Eyes: Conjunctivae and EOM are normal. Pupils are equal, round, and reactive to light. Neck: Normal range of motion. Neck supple. No JVD present. No thyromegaly present. Cardiovascular: Normal rate, normal heart sounds and intact distal pulses. An irregular rhythm present. No murmur heard. Pulmonary/Chest: Effort normal and breath sounds normal. No respiratory distress. She has no wheezes. She has no rales. Chest non-tender. Clear breath sounds. O2 sats 97-98% RA. Abdominal: Soft. Bowel sounds are normal. She exhibits no distension. There is no tenderness. Musculoskeletal: Normal range of motion. She exhibits no edema. Reproducible right shoulder pain with movement. No edema or calf tenderness. No palpable deformity. No bony tenderness. Neurovascularly intact x4. Neurological: She is alert and oriented to person, place, and time. No cranial nerve deficit. Skin: Skin is warm and dry. No rash noted. Psychiatric: She has a normal mood and affect. Her behavior is normal. Thought content normal.    Note written by Fox Buenrostro, as dictated by Jarrell Samuel MD 2:32 PM    [facility]  ED Course       Procedures      ED EKG interpretation:  Rhythm: atrial fib; and irregular. Rate (approx.): 89; Axis: left axis deviation; ST/T wave: normal; No acute injury pattern  Note written by Fox Buenrostro, as dictated by Jarrell Samuel MD 2:27 PM     PROGRESS NOTE:  5:16 PM  Patient's UA positive for leukocyte esterase. 20-50 white cells. Moderate epithelial cells. Negative for bacteria. Sample possibly contaminated. Chemistry is normal. WBC 15,000. D-dimer is slightly elevated. Troponin is negative. CT of chest shows no acute process. X-ray of chest and shoulder negative. Monitor also shows that patient has spontaneously converted to NSR and is no longer in A-fib. Dx of right shoulder pain. No hypoxia or dyspnea in ED. Will discharge with prescription for tramadol and instructions to follow up with PCP in ~1 week.

## 2017-07-10 ENCOUNTER — TELEPHONE (OUTPATIENT)
Dept: INTERNAL MEDICINE CLINIC | Age: 82
End: 2017-07-10

## 2017-07-10 LAB
ATRIAL RATE: 127 BPM
BACTERIA SPEC CULT: NORMAL
CALCULATED R AXIS, ECG10: -45 DEGREES
CALCULATED T AXIS, ECG11: 2 DEGREES
CC UR VC: NORMAL
DIAGNOSIS, 93000: NORMAL
Q-T INTERVAL, ECG07: 372 MS
QRS DURATION, ECG06: 78 MS
QTC CALCULATION (BEZET), ECG08: 452 MS
SERVICE CMNT-IMP: NORMAL
VENTRICULAR RATE, ECG03: 89 BPM

## 2017-07-10 NOTE — TELEPHONE ENCOUNTER
Called pts daughter who is on hipaa form to schedule hospital follow up per records we received. Pt was advise to follow up in our office in 3 days. Pt was seen in the ER on 07/08/2017. Appt scheduled with MD; Pt's  PCP is out of the office.

## 2017-07-11 ENCOUNTER — PATIENT OUTREACH (OUTPATIENT)
Dept: INTERNAL MEDICINE CLINIC | Age: 82
End: 2017-07-11

## 2017-07-11 NOTE — PROGRESS NOTES
NNTOCED Call    Patient discharged on 7/8/17 from [facility]. Diagnosis: A-fib and shoulder pain. Spoke with patient's dtr briefly (identified by 2 patient identifiers) today. Pt resides at [address] [facility] [address]. Per dtr, pt is doing ok, but remains fatigue. dtr reports the A-fib is new for the pt, does not see cardio. Pt is currently not receiving treatment for A-fib. Dtr prefers pt not receive any more meds unless she absolutely needs it. Pt is not a good candidate for anticoagulations due to frequent falls. Dtr will request that facility check pt's V/S's 1 - 2 times a day. Pt will f/u with Dr. Analy Haji since PCP is off on     Future Appointments      Provider Shelby Karimi   7/13/2017 3:00 PM Stephanie Amado MD [facility]         This note will not be viewable in [address]

## 2017-07-13 ENCOUNTER — OFFICE VISIT (OUTPATIENT)
Dept: INTERNAL MEDICINE CLINIC | Age: 82
End: 2017-07-13

## 2017-07-13 VITALS
HEIGHT: 67 IN | RESPIRATION RATE: 14 BRPM | HEART RATE: 72 BPM | TEMPERATURE: 98.5 F | SYSTOLIC BLOOD PRESSURE: 113 MMHG | OXYGEN SATURATION: 96 % | DIASTOLIC BLOOD PRESSURE: 63 MMHG

## 2017-07-13 DIAGNOSIS — I48.91 ATRIAL FIBRILLATION, UNSPECIFIED TYPE (HCC): ICD-10-CM

## 2017-07-13 DIAGNOSIS — I10 ESSENTIAL HYPERTENSION: Chronic | ICD-10-CM

## 2017-07-13 DIAGNOSIS — H91.90 HEARING LOSS, UNSPECIFIED HEARING LOSS TYPE, UNSPECIFIED LATERALITY: ICD-10-CM

## 2017-07-13 DIAGNOSIS — F34.1 DYSTHYMIA: Chronic | ICD-10-CM

## 2017-07-13 DIAGNOSIS — M25.511 ACUTE PAIN OF RIGHT SHOULDER: Primary | ICD-10-CM

## 2017-07-13 NOTE — PROGRESS NOTES
HISTORY OF PRESENT ILLNESS  Oral Ernesto is a 80 y.o. female. HPI   Presents today for hospital follow up. Presents today with daughter. Patient was admitted to ER on 7/8/2017 for right shoulder pain and SOB. Patient had CT scan and chest XR. Patient had irregular rhythm on EKG that shortly resolved. ER reports she has AFIB. ER concerned about possible clots. Patient reports her shoulder has not hurt since Saturday. Hypertension ROS: taking medications as instructed, no medication side effects noted, no TIA's, no chest pain on exertion, no dyspnea on exertion, no swelling of ankles. New concerns:  Patient's BP in office today is 113/63. Patient has history of AFIB in her family. Patient states her mood is doing okay. Patient has hearing aids. Her daughter states there was some blockage in her left ear that did not allow the hearing aid to be placed. Review of Systems   All other systems reviewed and are negative. Physical Exam   Constitutional: She appears well-developed and well-nourished. HENT:   Head: Normocephalic and atraumatic. Right Ear: External ear normal.   Left Ear: External ear normal.   Nose: Nose normal.   Mouth/Throat: Oropharynx is clear and moist.   Ear canal not clear. Blockage in left ear. Eyes: Conjunctivae and EOM are normal.   Neck: Normal range of motion. Neck supple. Carotid bruit is not present. No thyroid mass and no thyromegaly present. Cardiovascular: Normal rate, regular rhythm, S1 normal, S2 normal, normal heart sounds and intact distal pulses. Patient heart beat regular in office today. Pulmonary/Chest: Effort normal and breath sounds normal.   Abdominal: Soft. Normal appearance and bowel sounds are normal. There is no hepatosplenomegaly. There is no tenderness. Musculoskeletal: Normal range of motion. Neurological: She has normal strength. No cranial nerve deficit or sensory deficit. Coordination normal.   Skin: Skin is warm, dry and intact.  No abrasion and no rash noted. Psychiatric: She has a normal mood and affect. Her behavior is normal. Judgment and thought content normal.   Nursing note and vitals reviewed. ASSESSMENT and PLAN  Javier Wilks was seen today for hospital follow up. Diagnoses and all orders for this visit:    Acute pain of right shoulder  Right shoulder pain has resolved. Essential hypertension  BP is at goal. I do not recommend any change in medications. Dysthymia  Mood stable. I do not recommend any devries in medication. Atrial fibrillation, unspecified type (HCC)Patient had irregular heart rhythm in ER. Patient heart rate normal to day in office. Referral to cardiology. She is a huge fall risk so I worry about anticoagulation for her but we will see whether the cardiology feels as if needs anything   -     REFERRAL TO CARDIOLOGY    Hearing loss, unspecified hearing loss type, unspecified laterality  Patient will follow up with Dr. Rahat Reyes to clean left ear. There is some blockage or irritation that is preventing the installation of hearing aid. lab results and schedule of future lab studies reviewed with patient  reviewed diet, exercise and weight control    Written by Evelina Munroe, as dictated by Sully Kelley MD.     Current diagnosis and concerns discussed with pt at length. Understands risks and benefits or current treatment plan and medications and accepts the treatment and medication with any possible risks. Pt asks appropriate questions which were answered. Pt instructed to call with any concerns or problems.

## 2017-08-07 ENCOUNTER — TELEPHONE (OUTPATIENT)
Dept: INTERNAL MEDICINE CLINIC | Age: 82
End: 2017-08-07

## 2017-08-07 NOTE — TELEPHONE ENCOUNTER
Juan José Freitas called regarding paperwork for the patient. When I asked if the nurse could return their call, I was scolded and told that the nurse just needs to complete and fax back the orders. I suggested that maybe the nurse did not have the paperwork yet and again, I was given an attitude and told that it was faxed twice today and a few times last week. Please fax back to [phone].  Also, their phone number is [phone]

## 2017-08-15 ENCOUNTER — OFFICE VISIT (OUTPATIENT)
Dept: CARDIOLOGY CLINIC | Age: 82
End: 2017-08-15

## 2017-08-15 VITALS
HEART RATE: 64 BPM | BODY MASS INDEX: 25.84 KG/M2 | OXYGEN SATURATION: 93 % | RESPIRATION RATE: 16 BRPM | WEIGHT: 165 LBS | SYSTOLIC BLOOD PRESSURE: 118 MMHG | DIASTOLIC BLOOD PRESSURE: 64 MMHG

## 2017-08-15 DIAGNOSIS — I10 ESSENTIAL HYPERTENSION: Primary | Chronic | ICD-10-CM

## 2017-08-15 DIAGNOSIS — R94.31 ABNORMAL EKG: ICD-10-CM

## 2017-08-15 DIAGNOSIS — R06.02 SOB (SHORTNESS OF BREATH): ICD-10-CM

## 2017-08-15 DIAGNOSIS — E78.00 HIGH CHOLESTEROL: ICD-10-CM

## 2017-08-15 NOTE — PROGRESS NOTES
Valdemar VERA [address]  [address],  [address]  [place], [address] [facility]    Office [phone]  Fax [phone]  Cell [phone]        Donovan Landeros is a 80 y.o. female referred for evaluation of new onset AF. Consult requested by Mayra Contreras MD.      Assessment  Encounter Diagnoses   Name Primary?  Essential hypertension Yes    High cholesterol     SOB (shortness of breath)     Abnormal EKG        Recommendations:    I am not convinced that Mrs. Kamron Rowley had an episode of AF during her recent ED visit although she is certainly at risk for such due to her advanced age. Her rhythm seems regular with intermittent atrial ectopy, although P waves are not prominent. Her presentation with dyspnea and shoulder pain was unlikely ACS. She has had no recurrent episodes. She does have underlying airway disease/ chronic bronchitis but is stable in this regard. Her exam does suggest mild wheezing but she is asx. Given her advanced age and poor functional status, I would not pursue further cardiac testing at this time. Even if she had AF, I would not pursue anticoagulation. She and her daughter agree with this strategy. She and her daughter are happy to follow up as needed. Subjective:    Patient presented to ED 1 month ago with SOB and shoulder discomfort. She states she has felt fine ever since but continues to have intermittent shoulder pain. Patient lives at [address] [place] [address]. For the past 5 months she has been wheelchair bound after a big fall. She denies any sensation of palpitations. Patient has standby help when using the restroom primarily for fall prevention per daughter. Patient formerly smoked but quit years ago. Patient has recurrent bronchitis for which she has an emergency inhaler at home. Daughter states pt typically does not have SOB and she believes that during most recent ED visit, SOB was secondary to the shoulder pain. The patient states her breathing is currently comfortable. Patients brother had late onset AF. Yane also notes that the patient has been more alert since ED visit. Patient denies any exertional chest pain,, syncope, orthopnea, edema or paroxysmal nocturnal dyspnea. No previous hx of stroke, MI or HF    Patient is accompanied by her daughter. Cardiac risk factors   HTN yes  DM no  Smoking yes, former smoker  Family hx of CAD yes    Cardiac testing  No specialty comments available. Past Medical History:   Diagnosis Date    Arthritis     Dr. Betsy Ramachandran [facility])     colon CA in situ    Depression     Hematoma (nontraumatic) of breast     Dr. Malagon January freeman breast surgeon    High cholesterol     Hypertension     Osteoporosis     Dr. Juan Mosqueda fall fractured vertebrae    Psychiatric disorder     depression    Umbilical hernia         Current Outpatient Prescriptions   Medication Sig Dispense Refill    albuterol (PROVENTIL VENTOLIN) 2.5 mg /3 mL (0.083 %) nebulizer solution 3 mL by Nebulization route two (2) times a day. 24 Each 3    guaiFENesin ER (MUCINEX) 600 mg ER tablet Take 1 Tab by mouth two (2) times a day. 60 Tab 1    fluticasone (FLONASE) 50 mcg/actuation nasal spray 2 Sprays by Both Nostrils route daily. 1 Bottle 3    traMADol (ULTRAM) 50 mg tablet Take 1 Tab by mouth every six (6) hours as needed for Pain. Max Daily Amount: 200 mg. 30 Tab 0    therapeutic multivitamin (THERAGRAN) tablet Take 1 Tab by mouth daily.  acetaminophen (TYLENOL) 500 mg tablet Take 1,000 mg by mouth every four (4) hours as needed for Pain.  albuterol (PROVENTIL, VENTOLIN) 2 mg/5 mL syrup Take 5 mL by mouth four (4) times daily as needed. Please give pt if she is coughing. If not improving need rx and nebulizer. 473 mL 2    escitalopram oxalate (LEXAPRO) 10 mg tablet Take 1 Tab by mouth daily.  90 Tab 1    Cholecalciferol, Vitamin D3, 1,000 unit cap Take 1 capsule by mouth daily 90 Cap 1    bismuth subsalicylate (BISMATROL) 262 mg/15 mL suspension Take 30 mL by mouth as needed (After each loose stool). Allergies   Allergen Reactions    Latex Unknown (comments)     Pt unable to report      Codeine Unknown (comments)    Sulfa Dyne Other (comments)     \"Crying jag\"          Review of Systems  Constitutional: Negative for fever, chills, malaise/fatigue and diaphoresis. Respiratory: Negative for cough, hemoptysis, sputum production, shortness of breath and wheezing. Cardiovascular: Negative for chest pain, palpitations, orthopnea, claudication, leg swelling and PND. Gastrointestinal: Negative for heartburn, nausea, vomiting, blood in stool and melena. Genitourinary: Negative for dysuria and flank pain. Musculoskeletal: Negative for back pain. Positive for intermittent shoulder pain. Skin: Negative for rash. Neurological: Negative for focal weakness, seizures, loss of consciousness, weakness and headaches. Endo/Heme/Allergies: Does not bruise/bleed easily. Psychiatric/Behavioral: Negative for memory loss. The patient does not have insomnia. Physical Exam    Visit Vitals    /64    Pulse 64    Resp 16    Wt 165 lb (74.8 kg)    SpO2 93%    BMI 25.84 kg/m2     Wt Readings from Last 3 Encounters:   08/15/17 165 lb (74.8 kg)   07/08/17 155 lb (70.3 kg)   04/25/17 159 lb 2.8 oz (72.2 kg)      General - well developed well nourished  Neck - JVP normal, thyroid nl  Cardiac - normal S1, S2, no murmurs, rubs or gallops. No clicks  Vascular - carotids without bruits, radials, femorals and pedal pulses equal bilateral  Lungs - clear to auscultation bilaterals, no rales or rhonchi. Wheezes bilateral bases.    Abd - soft nontender, no HSM, no abd bruits  Extremities - no edema  Skin - no rash  Neuro - nonfocal  Psych - normal mood and affect      Cardiographics  Echo 5/8/14 LVEF 60%, normal atrial dimensions  EKG 10/31/16 - SB 55, nonspecific T wave abnormalities  EKG 7/8/17- regular rhythm with intermittent PACs, unclear if sinus/ectopic atrial rhythm, doubt AF    Written by Anu Vega, dictated by Elliot Melissa MD.  Elliot Melissa MD

## 2017-08-15 NOTE — MR AVS SNAPSHOT
Visit Information Date & Time Provider Department Dept. Phone Encounter #  
 8/15/2017  1:40 PM Zoe Mendez MD [facility] Tevin Vergara [phone] [id number] Upcoming Health Maintenance Date Due ZOSTER VACCINE AGE 60> 9/18/1984 GLAUCOMA SCREENING Q2Y 11/18/1989 MEDICARE YEARLY EXAM 7/15/2017 INFLUENZA AGE 9 TO ADULT 8/1/2017 Pneumococcal 65+ High/Highest Risk (2 of 2 - PPSV23) 11/1/2017 DTaP/Tdap/Td series (2 - Td) 4/28/2024 Allergies as of 8/15/2017  Review Complete On: 7/13/2017 By: Vinay Mix LPN Severity Noted Reaction Type Reactions Latex  08/26/2012    Unknown (comments) Pt unable to report Codeine  08/26/2012    Unknown (comments) Sulfa Dyne  08/26/2012    Other (comments) \"Crying jag\" Current Immunizations  Reviewed on 10/14/2016 Name Date Influenza High Dose Vaccine PF 10/4/2016 Influenza Vaccine 10/1/2014, 11/1/2012 Pneumococcal Vaccine (Unspecified Type) 11/1/2012 Tdap 4/28/2014  2:44 AM  
  
 Not reviewed this visit You Were Diagnosed With   
  
 Codes Comments Essential hypertension    -  Primary ICD-10-CM: I10 
ICD-9-CM: 401.9 High cholesterol     ICD-10-CM: E78.00 ICD-9-CM: 272.0 Vitals BP Pulse Resp Weight(growth percentile) SpO2 BMI  
 118/64 64 16 165 lb (74.8 kg) 93% 25.84 kg/m2 OB Status Smoking Status Postmenopausal Former Smoker BMI and BSA Data Body Mass Index Body Surface Area  
 25.84 kg/m 2 1.88 m 2 Preferred Pharmacy Pharmacy Name Phone [facility] [phone] Your Updated Medication List  
  
   
This list is accurate as of: 8/15/17  1:53 PM.  Always use your most recent med list.  
  
  
  
  
 acetaminophen 500 mg tablet Commonly known as:  TYLENOL Take 1,000 mg by mouth every four (4) hours as needed for Pain. * albuterol 2 mg/5 mL syrup Commonly known as:  Vernal Sinner Take 5 mL by mouth four (4) times daily as needed. Please give pt if she is coughing. If not improving need rx and nebulizer. * albuterol 2.5 mg /3 mL (0.083 %) nebulizer solution Commonly known as:  PROVENTIL VENTOLIN  
3 mL by Nebulization route two (2) times a day. BISMATROL 262 mg/15 mL suspension Generic drug:  bismuth subsalicylate Take 30 mL by mouth as needed (After each loose stool). cholecalciferol 1,000 unit Cap Commonly known as:  VITAMIN D3 Take 1 capsule by mouth daily  
  
 escitalopram oxalate 10 mg tablet Commonly known as:  Vallorie Primmer Take 1 Tab by mouth daily. fluticasone 50 mcg/actuation nasal spray Commonly known as:  Ann Leong 2 Sprays by Both Nostrils route daily. guaiFENesin  mg ER tablet Commonly known as:  Samy & Samy Take 1 Tab by mouth two (2) times a day. therapeutic multivitamin tablet Commonly known as:  [facility] Take 1 Tab by mouth daily. traMADol 50 mg tablet Commonly known as:  ULTRAM  
Take 1 Tab by mouth every six (6) hours as needed for Pain. Max Daily Amount: 200 mg.  
  
 * Notice: This list has 2 medication(s) that are the same as other medications prescribed for you. Read the directions carefully, and ask your doctor or other care provider to review them with you. Introducing [facility] SERVICES! Jaja Bro introduces Seat[employer] patient portal. Now you can access parts of your medical record, email your doctor's office, and request medication refills online. 1. In your internet browser, go to [email or web address] 2. Click on the First Time User? Click Here link in the Sign In box. You will see the New Member Sign Up page. 3. Enter your SeatID Access Code exactly as it appears below. You will not need to use this code after youve completed the sign-up process.  If you do not sign up before the expiration date, you must request a new code. 
 
· Play for [employer] Access Code: [email or web address] Expires: 9/13/2017 12:14 PM 
 
4. Enter the last four digits of your Social Security Number (xxxx) and Date of Birth (mm/dd/yyyy) as indicated and click Submit. You will be taken to the next sign-up page. 5. Create a Play for Job ID. This will be your Play for Job login ID and cannot be changed, so think of one that is secure and easy to remember. 6. Create a Play for [employer] password. You can change your password at any time. 7. Enter your Password Reset Question and Answer. This can be used at a later time if you forget your password. 8. Enter your e-mail address. You will receive e-mail notification when new information is available in [address] 9. Click Sign Up. You can now view and download portions of your medical record. 10. Click the Download Summary menu link to download a portable copy of your medical information. If you have questions, please visit the Frequently Asked Questions section of the Play for [employer] website. Remember, Play for [employer] is NOT to be used for urgent needs. For medical emergencies, dial 911. Now available from your iPhone and Android! Please provide this summary of care documentation to your next provider. Your primary care clinician is listed as Suma Gordillo. If you have any questions after today's visit, please call [phone].

## 2017-09-18 ENCOUNTER — TELEPHONE (OUTPATIENT)
Dept: INTERNAL MEDICINE CLINIC | Age: 82
End: 2017-09-18

## 2017-09-18 ENCOUNTER — OFFICE VISIT (OUTPATIENT)
Dept: INTERNAL MEDICINE CLINIC | Age: 82
End: 2017-09-18

## 2017-09-18 VITALS
SYSTOLIC BLOOD PRESSURE: 131 MMHG | TEMPERATURE: 98.3 F | OXYGEN SATURATION: 94 % | HEIGHT: 67 IN | RESPIRATION RATE: 16 BRPM | HEART RATE: 79 BPM | DIASTOLIC BLOOD PRESSURE: 72 MMHG

## 2017-09-18 DIAGNOSIS — L74.0 HEAT RASH: Primary | ICD-10-CM

## 2017-09-18 NOTE — PATIENT INSTRUCTIONS
Gas and Bloating: Care Instructions  Your Care Instructions  Gas and bloating can be uncomfortable and embarrassing problems. All people pass gas, but some people produce more gas than others, sometimes enough to cause distress. It is normal to pass gas from 6 to 20 times per day. Excess gas usually is not caused by a serious health problem. Gas and bloating usually are caused by something you eat or drink, including some food supplements and medicines. Gas and bloating are usually harmless and go away without treatment. However, changing your diet can help end the problem. Some over-the-counter medicines can help prevent gas and relieve bloating. Follow-up care is a key part of your treatment and safety. Be sure to make and go to all appointments, and call your doctor if you are having problems. It's also a good idea to know your test results and keep a list of the medicines you take. How can you care for yourself at home? · Keep a food diary if you think a food gives you gas. Write down what you eat or drink. Also record when you get gas. If you notice that a food seems to cause your gas each time, avoid it and see if the gas goes away. Examples of foods that cause gas include:  ¨ Fried and fatty foods. ¨ Beans. ¨ Vegetables such as artichokes, asparagus, broccoli, brussels sprouts, cabbage, cauliflower, cucumbers, green peppers, onions, peas, radishes, and raw potatoes. ¨ Fruits such as apricots, bananas, melons, peaches, pears, prunes, and raw apples. ¨ Wheat and wheat bran. · Soak dry beans in water overnight, then dump the water and cook the soaked beans in new water. This can help prevent gas and bloating. · If you have problems with lactose, avoid dairy products such as milk and cheese. · Try not to swallow air. Do not drink through a straw, gulp your food, or chew gum. · Take an over-the-counter medicine. Read and follow all instructions on the label.   ¨ Food enzymes, such as Beano, can be added to gas-producing foods to prevent gas. ¨ Antacids, such as Maalox Anti-Gas and Mylanta Gas, can relieve bloating by making you burp. Be careful when you take over-the-counter antacid medicines. Many of these medicines have aspirin in them. Read the label to make sure that you are not taking more than the recommended dose. Too much aspirin can be harmful. ¨ Activated charcoal tablets, such as CharcoCaps, may decrease odor from gas you pass. ¨ If you have problems with lactose, you can take medicines such as Dairy Ease and Lactaid with dairy products to prevent gas and bloating. · Get some exercise regularly. When should you call for help? Call 911 anytime you think you may need emergency care. For example, call if:  · You have gas and signs of a heart attack, such as:  ¨ Chest pain or pressure. ¨ Sweating. ¨ Shortness of breath. ¨ Nausea or vomiting. ¨ Pain that spreads from the chest to the neck, jaw, or one or both shoulders or arms. ¨ Dizziness or lightheadedness. ¨ A fast or uneven pulse. After calling 911, chew 1 adult-strength aspirin. Wait for an ambulance. Do not try to drive yourself. Call your doctor now or seek immediate medical care if:  · You have severe belly pain. · You have blood in your stool. Watch closely for changes in your health, and be sure to contact your doctor if:  · You have blood or pus in your urine. · Your urine is cloudy or smells bad. · You are burping and have trouble swallowing. · You feel bloated and have swelling in your belly. · You do not get better as expected. Where can you learn more? Go to http://suzanne-abdi.info/. Enter O781 in the search box to learn more about \"Gas and Bloating: Care Instructions. \"  Current as of: March 20, 2017  Content Version: 11.3  © 8009-3378 [employer]. Care instructions adapted under license by [employer] (which disclaims liability or warranty for this information).  If you have questions about a medical condition or this instruction, always ask your healthcare professional. [place] [address] any warranty or liability for your use of this information.

## 2017-09-18 NOTE — TELEPHONE ENCOUNTER
Spoke to pt's daughter Julio Bueno), advised per PCP, will need an appt to evaluate, daughter verbalized understanding and will bring her mom in for an appt.

## 2017-09-18 NOTE — TELEPHONE ENCOUNTER
Faxed sent from [facility] over weekend re pt with rash and red bumps all around waist line of pants abdominal area. ..would like to know if need an apt or if something like cortizone cream sent in please call rosario her number for Sudheer Alcaraz her daughter [phone]

## 2017-09-18 NOTE — MR AVS SNAPSHOT
Visit Information Date & Time Provider Department Dept. Phone Encounter #  
 9/18/2017 12:30 PM Kaycee Monique MD [facility] [address] [id number] Upcoming Health Maintenance Date Due ZOSTER VACCINE AGE 60> 9/18/1984 GLAUCOMA SCREENING Q2Y 11/18/1989 MEDICARE YEARLY EXAM 7/15/2017 INFLUENZA AGE 9 TO ADULT 8/1/2017 Pneumococcal 65+ High/Highest Risk (2 of 2 - PPSV23) 11/1/2017 DTaP/Tdap/Td series (2 - Td) 4/28/2024 Allergies as of 9/18/2017  Review Complete On: 9/18/2017 By: Kaycee Monique MD  
  
 Severity Noted Reaction Type Reactions Latex  08/26/2012    Unknown (comments) Pt unable to report Codeine  08/26/2012    Unknown (comments) Sulfa Dyne  08/26/2012    Other (comments) \"Crying jag\" Current Immunizations  Reviewed on 10/14/2016 Name Date Influenza High Dose Vaccine PF 10/4/2016 Influenza Vaccine 10/1/2014, 11/1/2012 Pneumococcal Vaccine (Unspecified Type) 11/1/2012 Tdap 4/28/2014  2:44 AM  
  
 Not reviewed this visit Vitals BP Pulse Temp Resp Height(growth percentile) SpO2  
 131/72 (BP 1 Location: Left arm, BP Patient Position: Sitting) 79 98.3 °F (36.8 °C) (Oral) 16 5' 7\" (1.702 m) 94% OB Status Smoking Status Postmenopausal Former Smoker Preferred Pharmacy Pharmacy Name Phone [facility] [phone] Your Updated Medication List  
  
   
This list is accurate as of: 9/18/17  1:05 PM.  Always use your most recent med list.  
  
  
  
  
 acetaminophen 500 mg tablet Commonly known as:  TYLENOL Take 1,000 mg by mouth every four (4) hours as needed for Pain. * albuterol 2 mg/5 mL syrup Commonly known as:  Bedelia Julisa Take 5 mL by mouth four (4) times daily as needed. Please give pt if she is coughing. If not improving need rx and nebulizer. * albuterol 2.5 mg /3 mL (0.083 %) nebulizer solution Commonly known as:  PROVENTIL VENTOLIN  
3 mL by Nebulization route two (2) times a day. BISMATROL 262 mg/15 mL suspension Generic drug:  bismuth subsalicylate Take 30 mL by mouth as needed (After each loose stool). cholecalciferol 1,000 unit Cap Commonly known as:  VITAMIN D3 Take 1 capsule by mouth daily  
  
 escitalopram oxalate 10 mg tablet Commonly known as:  Alejandro Snow Take 1 Tab by mouth daily. fluticasone 50 mcg/actuation nasal spray Commonly known as:  Marine Knack 2 Sprays by Both Nostrils route daily. guaiFENesin  mg ER tablet Commonly known as:  Voicendo Take 1 Tab by mouth two (2) times a day. therapeutic multivitamin tablet Commonly known as:  [facility] Take 1 Tab by mouth daily. traMADol 50 mg tablet Commonly known as:  ULTRAM  
Take 1 Tab by mouth every six (6) hours as needed for Pain. Max Daily Amount: 200 mg.  
  
 * Notice: This list has 2 medication(s) that are the same as other medications prescribed for you. Read the directions carefully, and ask your doctor or other care provider to review them with you. Patient Instructions Gas and Bloating: Care Instructions Your Care Instructions Gas and bloating can be uncomfortable and embarrassing problems. All people pass gas, but some people produce more gas than others, sometimes enough to cause distress. It is normal to pass gas from 6 to 20 times per day. Excess gas usually is not caused by a serious health problem. Gas and bloating usually are caused by something you eat or drink, including some food supplements and medicines. Gas and bloating are usually harmless and go away without treatment. However, changing your diet can help end the problem. Some over-the-counter medicines can help prevent gas and relieve bloating. Follow-up care is a key part of your treatment and safety.  Be sure to make and go to all appointments, and call your doctor if you are having problems. It's also a good idea to know your test results and keep a list of the medicines you take. How can you care for yourself at home? · Keep a food diary if you think a food gives you gas. Write down what you eat or drink. Also record when you get gas. If you notice that a food seems to cause your gas each time, avoid it and see if the gas goes away. Examples of foods that cause gas include: ¨ Fried and fatty foods. ¨ Beans. ¨ Vegetables such as artichokes, asparagus, broccoli, brussels sprouts, cabbage, cauliflower, cucumbers, green peppers, onions, peas, radishes, and raw potatoes. ¨ Fruits such as apricots, bananas, melons, peaches, pears, prunes, and raw apples. ¨ Wheat and wheat bran. · Soak dry beans in water overnight, then dump the water and cook the soaked beans in new water. This can help prevent gas and bloating. · If you have problems with lactose, avoid dairy products such as milk and cheese. · Try not to swallow air. Do not drink through a straw, gulp your food, or chew gum. · Take an over-the-counter medicine. Read and follow all instructions on the label. ¨ Food enzymes, such as Beano, can be added to gas-producing foods to prevent gas. ¨ Antacids, such as Maalox Anti-Gas and Mylanta Gas, can relieve bloating by making you burp. Be careful when you take over-the-counter antacid medicines. Many of these medicines have aspirin in them. Read the label to make sure that you are not taking more than the recommended dose. Too much aspirin can be harmful. ¨ Activated charcoal tablets, such as CharcoCaps, may decrease odor from gas you pass. ¨ If you have problems with lactose, you can take medicines such as Dairy Ease and Lactaid with dairy products to prevent gas and bloating. · Get some exercise regularly. When should you call for help? Call 911 anytime you think you may need emergency care. For example, call if: · You have gas and signs of a heart attack, such as: ¨ Chest pain or pressure. ¨ Sweating. ¨ Shortness of breath. ¨ Nausea or vomiting. ¨ Pain that spreads from the chest to the neck, jaw, or one or both shoulders or arms. ¨ Dizziness or lightheadedness. ¨ A fast or uneven pulse. After calling 911, chew 1 adult-strength aspirin. Wait for an ambulance. Do not try to drive yourself. Call your doctor now or seek immediate medical care if: 
· You have severe belly pain. · You have blood in your stool. Watch closely for changes in your health, and be sure to contact your doctor if: 
· You have blood or pus in your urine. · Your urine is cloudy or smells bad. · You are burping and have trouble swallowing. · You feel bloated and have swelling in your belly. · You do not get better as expected. Where can you learn more? Go to http://suzanne-abdi.info/. Enter M190 in the search box to learn more about \"Gas and Bloating: Care Instructions. \" Current as of: March 20, 2017 Content Version: 11.3 © 4400-3001 [employer]. Care instructions adapted under license by [employer] (which disclaims liability or warranty for this information). If you have questions about a medical condition or this instruction, always ask your healthcare professional. [address] any warranty or liability for your use of this information. Introducing [facility]! [employer] introduces [employer] patient portal. Now you can access parts of your medical record, email your doctor's office, and request medication refills online. 1. In your internet browser, go to [email or web address] [email or web address]. [employer] Box [employer]uli 2. Click on the First Time User? Click Here link in the Sign In box. You will see the New Member Sign Up page. 3. Enter your [employer] Access Code exactly as it appears below. You will not need to use this code after youve completed the sign-up process.  If you do not sign up before the expiration date, you must request a new code. · [employer] Access Code: [email or web address] Expires: 12/17/2017  1:04 PM 
 
4. Enter the last four digits of your Social Security Number (xxxx) and Date of Birth (mm/dd/yyyy) as indicated and click Submit. You will be taken to the next sign-up page. 5. Create a [employer] ID. This will be your [employer] login ID and cannot be changed, so think of one that is secure and easy to remember. 6. Create a [employer] password. You can change your password at any time. 7. Enter your Password Reset Question and Answer. This can be used at a later time if you forget your password. 8. Enter your e-mail address. You will receive e-mail notification when new information is available in [address] 9. Click Sign Up. You can now view and download portions of your medical record. 10. Click the Download Summary menu link to download a portable copy of your medical information. If you have questions, please visit the Frequently Asked Questions section of the [employer] website. Remember, [employer] is NOT to be used for urgent needs. For medical emergencies, dial 911. Now available from your iPhone and Android! Please provide this summary of care documentation to your next provider. Your primary care clinician is listed as Gia Buck. If you have any questions after today's visit, please call [phone].

## 2017-09-18 NOTE — PROGRESS NOTES
Chief Complaint   Patient presents with    Rash       Rash  Pt presents with daughter, Sundar Verdugo. Sidonie Single is not here. Mildred noticed pt has been itching back of legs and sides. Pt reports sx for >2 weeks. She has not been sitting in the sun. She reports on back of glute area and back of top part of legs. She has not tried anything otc because the facility needs to have doctor's order prior to trying. Asthma  Stable  Mildred had questions re: her albuterol syrup.   Pt was given prn    Past Medical History:   Diagnosis Date    Arthritis     Dr. Brenden Joiner [facility])     colon CA in situ    Depression     Hematoma (nontraumatic) of breast     Dr. Darrell freeman breast surgeon    High cholesterol     Hypertension     Osteoporosis     Dr. Blu Padilla fall fractured vertebrae    Psychiatric disorder     depression    Umbilical hernia      Past Surgical History:   Procedure Laterality Date    HX COLECTOMY  1985    HX HEENT      cateracts bilateral    HX HIP REPLACEMENT  2009    Right    HX ORTHOPAEDIC  2012    T11 & L1 vertebrae fx    HX PELVIC FRACTURE [place]  2010     Social History     Social History    Marital status:      Spouse name: N/A    Number of children: N/A    Years of education: N/A     Social History Main Topics    Smoking status: Former Smoker     Packs/day: 0.50     Quit date: 1/1/1970    Smokeless tobacco: Never Used    Alcohol use Yes      Comment: occasional    Drug use: No    Sexual activity: No     Other Topics Concern    None     Social History Narrative    [employer], [employer]    Can cook if she wants        3 children    2 daughters and 1 son        No smoke        Drink- wine, or bourbon or vodka 1 ounce     Family History   Problem Relation Age of Onset    Heart Disease Mother      Current Outpatient Prescriptions   Medication Sig Dispense Refill    albuterol (PROVENTIL VENTOLIN) 2.5 mg /3 mL (0.083 %) nebulizer solution 3 mL by Nebulization route two (2) times a day. 24 Each 3    guaiFENesin ER (MUCINEX) 600 mg ER tablet Take 1 Tab by mouth two (2) times a day. 60 Tab 1    fluticasone (FLONASE) 50 mcg/actuation nasal spray 2 Sprays by Both Nostrils route daily. 1 Bottle 3    traMADol (ULTRAM) 50 mg tablet Take 1 Tab by mouth every six (6) hours as needed for Pain. Max Daily Amount: 200 mg. 30 Tab 0    therapeutic multivitamin (THERAGRAN) tablet Take 1 Tab by mouth daily.  acetaminophen (TYLENOL) 500 mg tablet Take 1,000 mg by mouth every four (4) hours as needed for Pain.  bismuth subsalicylate (BISMATROL) 262 mg/15 mL suspension Take 30 mL by mouth as needed (After each loose stool).  albuterol (PROVENTIL, VENTOLIN) 2 mg/5 mL syrup Take 5 mL by mouth four (4) times daily as needed. Please give pt if she is coughing. If not improving need rx and nebulizer. 473 mL 2    escitalopram oxalate (LEXAPRO) 10 mg tablet Take 1 Tab by mouth daily. 90 Tab 1    Cholecalciferol, Vitamin D3, 1,000 unit cap Take 1 capsule by mouth daily 90 Cap 1     Allergies   Allergen Reactions    Latex Unknown (comments)     Pt unable to report      Codeine Unknown (comments)    Sulfa Dyne Other (comments)     \"Crying jag\"       Review of Systems - General ROS: negative for - chills, fatigue, fever, hot flashes, malaise or night sweats  Cardiovascular ROS: no chest pain or dyspnea on exertion  Respiratory ROS: no cough, shortness of breath, or wheezing    Visit Vitals    /72 (BP 1 Location: Left arm, BP Patient Position: Sitting)    Pulse 79    Temp 98.3 °F (36.8 °C) (Oral)    Resp 16    Ht 5' 7\" (1.702 m)    SpO2 94%     General Appearance:  Well developed, well nourished,alert and oriented x 3, and individual in no acute distress. Ears/Nose/Mouth/Throat:   Hearing grossly normal.         Neck: Supple, no lad, no bruits   Chest:   Lungs clear to auscultation bilaterally. Cardiovascular:  Regular rate and rhythm, S1, S2 normal, no murmur.    Abdomen: Soft, non-tender, bowel sounds are active. Extremities: No edema bilaterally. Skin: Warm and dry, no suspicious lesions, I was not able to appreciate erythema on back or glute or chest. + seen on posterior leg when standing. fine papules on palpation. No decubiti noted                 Diagnoses and all orders for this visit:    1. Heat rash  I did not appreciate any blisters, erythema. I discussed with geno to try cornstarch baby powder. I do not think she should use topical steroids or benedryl due to se profile and I think we need to get to underlying problem. She will try cotton clothing as well. Asthma  stable  I spent 15 min with this patient and >50% of the time was spent on counseling and management of rash related to heat and pressure. Advised conservative care and changing positions. This note will not be viewable in [address]

## 2017-10-02 ENCOUNTER — TELEPHONE (OUTPATIENT)
Dept: INTERNAL MEDICINE CLINIC | Age: 82
End: 2017-10-02

## 2017-10-02 NOTE — TELEPHONE ENCOUNTER
Per daughter she received a call from the nursing home and saying that the Rash is not any better. Needs some thing stronger and the Daughter can't bring her in now she is in [facility]). Her no is [phone]  [facility] home.  Daughter will call back later to check to make sure it was done

## 2017-10-02 NOTE — TELEPHONE ENCOUNTER
Spoke to pt's daughter Bekah Cain), she said she is going to bring her mom in for an appt to reassess rash as nursing home staff thinks rash is spreading. Advised this would be ok. Appt scheduled.

## 2017-10-05 ENCOUNTER — OFFICE VISIT (OUTPATIENT)
Dept: INTERNAL MEDICINE CLINIC | Age: 82
End: 2017-10-05

## 2017-10-05 VITALS
SYSTOLIC BLOOD PRESSURE: 152 MMHG | DIASTOLIC BLOOD PRESSURE: 73 MMHG | OXYGEN SATURATION: 95 % | TEMPERATURE: 98.2 F | HEIGHT: 67 IN | HEART RATE: 74 BPM | RESPIRATION RATE: 20 BRPM

## 2017-10-05 DIAGNOSIS — R21 RASH: Primary | ICD-10-CM

## 2017-10-05 DIAGNOSIS — Z23 ENCOUNTER FOR IMMUNIZATION: ICD-10-CM

## 2017-10-05 DIAGNOSIS — I10 ESSENTIAL HYPERTENSION: Chronic | ICD-10-CM

## 2017-10-05 DIAGNOSIS — J45.20 MILD INTERMITTENT ASTHMA WITHOUT COMPLICATION: ICD-10-CM

## 2017-10-05 RX ORDER — TRIAMCINOLONE ACETONIDE 1 MG/G
CREAM TOPICAL 2 TIMES DAILY
Qty: 15 G | Refills: 0 | Status: SHIPPED | OUTPATIENT
Start: 2017-10-05 | End: 2017-10-12 | Stop reason: SDUPTHER

## 2017-10-05 NOTE — PROGRESS NOTES
Chief Complaint   Patient presents with    Rash       Rash  Pt presents with Clemetine Grounds. Daughter reports patient is still itching and do not think desitin is being applied. No fevers. She is itching during the day. Pt is using cotton shirts and nylon pants. Asthma  Stable  No wheezing    Subjective:   Luis Herzog is a 80 y.o. female with hypertension. Hypertension ROS: taking medications as instructed, no medication side effects noted, no TIA's, no chest pain on exertion, no dyspnea on exertion, no swelling of ankles. New concerns: none.          Past Medical History:   Diagnosis Date    Arthritis     Dr. Mimi Huang [facility])     colon CA in situ    Depression     Hematoma (nontraumatic) of breast     Dr. Zoë freeman breast surgeon    High cholesterol     Hypertension     Osteoporosis     Dr. Grisel Trent fall fractured vertebrae    Psychiatric disorder     depression    Umbilical hernia      Past Surgical History:   Procedure Laterality Date    HX COLECTOMY  1985    HX HEENT      cateracts bilateral    HX HIP REPLACEMENT  2009    Right    HX ORTHOPAEDIC  2012    T11 & L1 vertebrae fx    HX PELVIC FRACTURE [place]  2010     Social History     Social History    Marital status:      Spouse name: N/A    Number of children: N/A    Years of education: N/A     Social History Main Topics    Smoking status: Former Smoker     Packs/day: 0.50     Quit date: 1/1/1970    Smokeless tobacco: Never Used    Alcohol use Yes      Comment: occasional    Drug use: No    Sexual activity: No     Other Topics Concern    None     Social History Narrative    [employer], [employer]    Can cook if she wants        3 children    2 daughters and 1 son        No smoke        Drink- wine, or bourbon or vodka 1 ounce     Family History   Problem Relation Age of Onset    Heart Disease Mother      Current Outpatient Prescriptions   Medication Sig Dispense Refill    albuterol (PROVENTIL VENTOLIN) 2.5 mg /3 mL (0.083 %) nebulizer solution 3 mL by Nebulization route two (2) times a day. 24 Each 3    fluticasone (FLONASE) 50 mcg/actuation nasal spray 2 Sprays by Both Nostrils route daily. 1 Bottle 3    therapeutic multivitamin (THERAGRAN) tablet Take 1 Tab by mouth daily.  acetaminophen (TYLENOL) 500 mg tablet Take 1,000 mg by mouth every four (4) hours as needed for Pain.  bismuth subsalicylate (BISMATROL) 262 mg/15 mL suspension Take 30 mL by mouth as needed (After each loose stool).  albuterol (PROVENTIL, VENTOLIN) 2 mg/5 mL syrup Take 5 mL by mouth four (4) times daily as needed. Please give pt if she is coughing. If not improving need rx and nebulizer. 473 mL 2    escitalopram oxalate (LEXAPRO) 10 mg tablet Take 1 Tab by mouth daily. 90 Tab 1    Cholecalciferol, Vitamin D3, 1,000 unit cap Take 1 capsule by mouth daily 90 Cap 1    guaiFENesin ER (MUCINEX) 600 mg ER tablet Take 1 Tab by mouth two (2) times a day. 60 Tab 1    traMADol (ULTRAM) 50 mg tablet Take 1 Tab by mouth every six (6) hours as needed for Pain. Max Daily Amount: 200 mg. 30 Tab 0     Allergies   Allergen Reactions    Latex Unknown (comments)     Pt unable to report      Codeine Unknown (comments)    Sulfa Dyne Other (comments)     \"Crying jag\"       Review of Systems - General ROS: negative for - chills, fatigue, fever, hot flashes, malaise or night sweats  Cardiovascular ROS: no chest pain or dyspnea on exertion  Respiratory ROS: no cough, shortness of breath, or wheezing    Visit Vitals    /73 (BP 1 Location: Left arm, BP Patient Position: Sitting)    Pulse 74    Temp 98.2 °F (36.8 °C) (Oral)    Resp 20    Ht 5' 7\" (1.702 m)    SpO2 95%     General Appearance:  Well developed, well nourished,alert and oriented x 3, and individual in no acute distress.    Ears/Nose/Mouth/Throat:   Hearing grossly normal.         Neck: Supple, no lad, no bruits   Chest:   Lungs clear to auscultation bilaterally. Cardiovascular:  Regular rate and rhythm, S1, S2 normal, no murmur. Abdomen:   Soft, non-tender, bowel sounds are active. Extremities: No edema bilaterally. Skin: Warm and dry, no suspicious lesions, dry papules, no clear blisters, erythema over left abdomen with scratch marks, did not appreciate at back, upper chest or arms           Diagnoses and all orders for this visit:    1. Rash  Appears to be contact dermatitis/heat rash  Not improving will change to cotton clothes  Will try benedryl at night 25 mg with caution re AMS /falls  wll use gloves  Will use triamcin cream bid  Edgar Olson will let me know    2. Essential hypertension  Recheck bp 132/78  Cont bp meds    3. Encounter for immunization  -     INFLUENZA VIRUS VACCINE, HIGH DOSE SEASONAL, PRESERVATIVE FREE    4. Mild intermittent asthma without complication  Needs flu vaccine  Cont meds  stable    Other orders  -     triamcinolone acetonide (KENALOG) 0.1 % topical cream; Apply  to affected area two (2) times a day. use thin layer      This note will not be viewable in MyChart.

## 2017-10-05 NOTE — MR AVS SNAPSHOT
Visit Information Date & Time Provider Department Dept. Phone Encounter #  
 10/5/2017  3:00 PM Blanca Dewitt MD [facility] [address] [id number] Upcoming Health Maintenance Date Due ZOSTER VACCINE AGE 60> 9/18/1984 GLAUCOMA SCREENING Q2Y 11/18/1989 MEDICARE YEARLY EXAM 7/15/2017 INFLUENZA AGE 9 TO ADULT 8/1/2017 Pneumococcal 65+ High/Highest Risk (2 of 2 - PPSV23) 11/1/2017 DTaP/Tdap/Td series (2 - Td) 4/28/2024 Allergies as of 10/5/2017  Review Complete On: 10/5/2017 By: Blanca Dewitt MD  
  
 Severity Noted Reaction Type Reactions Latex  08/26/2012    Unknown (comments) Pt unable to report Codeine  08/26/2012    Unknown (comments) Sulfa Dyne  08/26/2012    Other (comments) \"Crying jag\" Current Immunizations  Reviewed on 10/14/2016 Name Date Influenza High Dose Vaccine PF 10/4/2016 Influenza Vaccine 10/1/2014, 11/1/2012 Pneumococcal Vaccine (Unspecified Type) 11/1/2012 Tdap 4/28/2014  2:44 AM  
  
 Not reviewed this visit You Were Diagnosed With   
  
 Codes Comments Rash    -  Primary ICD-10-CM: R21 
ICD-9-CM: 782.1 Essential hypertension     ICD-10-CM: I10 
ICD-9-CM: 401.9 Vitals BP Pulse Temp Resp Height(growth percentile) SpO2  
 152/73 (BP 1 Location: Left arm, BP Patient Position: Sitting) 74 98.2 °F (36.8 °C) (Oral) 20 5' 7\" (1.702 m) 95% OB Status Smoking Status Postmenopausal Former Smoker Vitals History Preferred Pharmacy Pharmacy Name Phone [facility] [phone] Your Updated Medication List  
  
   
This list is accurate as of: 10/5/17  3:51 PM.  Always use your most recent med list.  
  
  
  
  
 acetaminophen 500 mg tablet Commonly known as:  TYLENOL Take 1,000 mg by mouth every four (4) hours as needed for Pain. * albuterol 2 mg/5 mL syrup Commonly known as:  Skinny Dec Take 5 mL by mouth four (4) times daily as needed. Please give pt if she is coughing. If not improving need rx and nebulizer. * albuterol 2.5 mg /3 mL (0.083 %) nebulizer solution Commonly known as:  PROVENTIL VENTOLIN  
3 mL by Nebulization route two (2) times a day. BISMATROL 262 mg/15 mL suspension Generic drug:  bismuth subsalicylate Take 30 mL by mouth as needed (After each loose stool). cholecalciferol 1,000 unit Cap Commonly known as:  VITAMIN D3 Take 1 capsule by mouth daily  
  
 escitalopram oxalate 10 mg tablet Commonly known as:  Boo Dub Take 1 Tab by mouth daily. fluticasone 50 mcg/actuation nasal spray Commonly known as:  Alric Eaves 2 Sprays by Both Nostrils route daily. guaiFENesin  mg ER tablet Commonly known as:  Samy & Samy Take 1 Tab by mouth two (2) times a day. therapeutic multivitamin tablet Commonly known as:  [facility] Take 1 Tab by mouth daily. traMADol 50 mg tablet Commonly known as:  ULTRAM  
Take 1 Tab by mouth every six (6) hours as needed for Pain. Max Daily Amount: 200 mg.  
  
 triamcinolone acetonide 0.1 % topical cream  
Commonly known as:  KENALOG Apply  to affected area two (2) times a day. use thin layer * Notice: This list has 2 medication(s) that are the same as other medications prescribed for you. Read the directions carefully, and ask your doctor or other care provider to review them with you. Prescriptions Printed Refills  
 triamcinolone acetonide (KENALOG) 0.1 % topical cream 0 Sig: Apply  to affected area two (2) times a day. use thin layer Class: Print Route: Topical  
  
Introducing [facility]! Alanna Natarajan introduces [employer] patient portal. Now you can access parts of your medical record, email your doctor's office, and request medication refills online. 1. In your internet browser, go to [email or web address] 2. Click on the First Time User? Click Here link in the Sign In box. You will see the New Member Sign Up page. 3. Enter your [employer] Access Code exactly as it appears below. You will not need to use this code after youve completed the sign-up process. If you do not sign up before the expiration date, you must request a new code. · [employer] Access Code: [email or web address] Expires: 12/17/2017  1:04 PM 
 
4. Enter the last four digits of your Social Security Number (xxxx) and Date of Birth (mm/dd/yyyy) as indicated and click Submit. You will be taken to the next sign-up page. 5. Create a ClearAppt ID. This will be your S[employer]ore login ID and cannot be changed, so think of one that is secure and easy to remember. 6. Create a [employer] password. You can change your password at any time. 7. Enter your Password Reset Question and Answer. This can be used at a later time if you forget your password. 8. Enter your e-mail address. You will receive e-mail notification when new information is available in [address] 9. Click Sign Up. You can now view and download portions of your medical record. 10. Click the Download Summary menu link to download a portable copy of your medical information. If you have questions, please visit the Frequently Asked Questions section of the [employer] website. Remember, [employer] is NOT to be used for urgent needs. For medical emergencies, dial 911. Now available from your iPhone and Android! Please provide this summary of care documentation to your next provider. Your primary care clinician is listed as Ally Webb. If you have any questions after today's visit, please call [phone].

## 2017-10-12 ENCOUNTER — TELEPHONE (OUTPATIENT)
Dept: INTERNAL MEDICINE CLINIC | Age: 82
End: 2017-10-12

## 2017-10-12 RX ORDER — TRIAMCINOLONE ACETONIDE 1 MG/G
CREAM TOPICAL 2 TIMES DAILY
Qty: 45 G | Refills: 0 | Status: SHIPPED | OUTPATIENT
Start: 2017-10-12 | End: 2019-01-01

## 2017-10-12 NOTE — TELEPHONE ENCOUNTER
Patient's daughter request a new order for Benadryl going to [address]) [facility] will not administer any thing unless in an order - thank you

## 2017-10-16 RX ORDER — DIPHENHYDRAMINE HCL 12.5MG/5ML
12.5 LIQUID (ML) ORAL
Qty: 118 ML | Refills: 0 | Status: SHIPPED | OUTPATIENT
Start: 2017-10-16 | End: 2017-10-19 | Stop reason: SDUPTHER

## 2017-10-19 ENCOUNTER — TELEPHONE (OUTPATIENT)
Dept: INTERNAL MEDICINE CLINIC | Age: 82
End: 2017-10-19

## 2017-10-19 ENCOUNTER — OFFICE VISIT (OUTPATIENT)
Dept: INTERNAL MEDICINE CLINIC | Age: 82
End: 2017-10-19

## 2017-10-19 VITALS
WEIGHT: 165 LBS | RESPIRATION RATE: 18 BRPM | HEIGHT: 67 IN | DIASTOLIC BLOOD PRESSURE: 65 MMHG | BODY MASS INDEX: 25.9 KG/M2 | SYSTOLIC BLOOD PRESSURE: 127 MMHG | HEART RATE: 79 BPM | TEMPERATURE: 98 F | OXYGEN SATURATION: 96 %

## 2017-10-19 DIAGNOSIS — Z00.00 MEDICARE ANNUAL WELLNESS VISIT, SUBSEQUENT: Primary | ICD-10-CM

## 2017-10-19 DIAGNOSIS — R06.2 WHEEZING: ICD-10-CM

## 2017-10-19 DIAGNOSIS — Z23 IMMUNIZATION DUE: ICD-10-CM

## 2017-10-19 DIAGNOSIS — R21 RASH: ICD-10-CM

## 2017-10-19 RX ORDER — DIPHENHYDRAMINE HCL 12.5MG/5ML
25 LIQUID (ML) ORAL
Qty: 236 ML | Refills: 0 | Status: SHIPPED | OUTPATIENT
Start: 2017-10-19 | End: 2019-01-01

## 2017-10-19 NOTE — PROGRESS NOTES
This is a Subsequent Medicare Annual Wellness Exam (AWV) (Performed 12 months after IPPE or effective date of Medicare Part B enrollment)    I have reviewed the patient's medical history in detail and updated the computerized patient record. History     Past Medical History:   Diagnosis Date    Arthritis     Dr. Antonieta Acosta [facility])     colon CA in situ    Depression     Hematoma (nontraumatic) of breast     Dr. Modesta Claude cox breast surgeon    High cholesterol     Hypertension     Osteoporosis     Dr. Alvaro Middleton fall fractured vertebrae    Psychiatric disorder     depression    Umbilical hernia       Past Surgical History:   Procedure Laterality Date    HX COLECTOMY  1985    HX HEENT      cateracts bilateral    HX HIP REPLACEMENT  2009    Right    HX ORTHOPAEDIC  2012    T11 & L1 vertebrae fx    HX PELVIC FRACTURE TX  2010     Current Outpatient Prescriptions   Medication Sig Dispense Refill    diphenhydrAMINE (BENADRYL ALLERGY) 12.5 mg/5 mL syrup Take 10 mL by mouth nightly as needed. Indications: Urticaria 236 mL 0    pneumococcal 13 romel conj dip (PREVNAR 13, PF,) 0.5 mL syrg injection 0.5 mL by IntraMUSCular route once for 1 dose. 0.5 mL 0    triamcinolone acetonide (KENALOG) 0.1 % topical cream Apply  to affected area two (2) times a day. use thin layer 45 g 0    albuterol (PROVENTIL VENTOLIN) 2.5 mg /3 mL (0.083 %) nebulizer solution 3 mL by Nebulization route two (2) times a day. 24 Each 3    guaiFENesin ER (MUCINEX) 600 mg ER tablet Take 1 Tab by mouth two (2) times a day. 60 Tab 1    fluticasone (FLONASE) 50 mcg/actuation nasal spray 2 Sprays by Both Nostrils route daily. 1 Bottle 3    traMADol (ULTRAM) 50 mg tablet Take 1 Tab by mouth every six (6) hours as needed for Pain. Max Daily Amount: 200 mg. 30 Tab 0    therapeutic multivitamin (THERAGRAN) tablet Take 1 Tab by mouth daily.       acetaminophen (TYLENOL) 500 mg tablet Take 1,000 mg by mouth every four (4) hours as needed for Pain.  bismuth subsalicylate (BISMATROL) 262 mg/15 mL suspension Take 30 mL by mouth as needed (After each loose stool).  albuterol (PROVENTIL, VENTOLIN) 2 mg/5 mL syrup Take 5 mL by mouth four (4) times daily as needed. Please give pt if she is coughing. If not improving need rx and nebulizer. 473 mL 2    escitalopram oxalate (LEXAPRO) 10 mg tablet Take 1 Tab by mouth daily. 90 Tab 1    Cholecalciferol, Vitamin D3, 1,000 unit cap Take 1 capsule by mouth daily 90 Cap 1     Allergies   Allergen Reactions    Latex Unknown (comments)     Pt unable to report      Codeine Unknown (comments)    Sulfa Dyne Other (comments)     \"Crying jag\"     Family History   Problem Relation Age of Onset    Heart Disease Mother      Social History   Substance Use Topics    Smoking status: Former Smoker     Packs/day: 0.50     Quit date: 1/1/1970    Smokeless tobacco: Never Used    Alcohol use Yes      Comment: occasional     Patient Active Problem List   Diagnosis Code    Compression fracture of L1 lumbar vertebra (HCC) S32.010A    Back pain M54.9    Fall at home [address] [place] [place], Y92.099    Arthritis M19.90    Essential hypertension I10    Dysthymia F34.1    Cancer ([facility] [address] C80.1    Psychiatric disorder F99    High cholesterol E78.00    Hypertension I10    Hematoma (nontraumatic) of breast N64.89    Osteoporosis M81.0    Depression F32.9    Advanced care planning/counseling discussion Z71.89    Rectus sheath hematoma S30. 1XXA    Thoracic compression fracture (HCC) S22.000A    SOB (shortness of breath) R06.02    Abnormal EKG R94.31       Depression Risk Factor Screening:     PHQ over the last two weeks 7/14/2016   Little interest or pleasure in doing things Not at all   Feeling down, depressed or hopeless Not at all   Total Score PHQ 2 0     Alcohol Risk Factor Screening: You do not drink alcohol or very rarely.       Functional Ability and Level of Safety:   Hearing Loss  The patient wears hearing aids.    Activities of Daily Living  The home contains: no safety equipment. Patient needs help with:  dressing, bathing, hygiene and walking    Fall Risk  Fall Risk Assessment, last 12 mths 10/19/2017   Able to walk? Yes   Fall in past 12 months? No   Fall with injury? -   Number of falls in past 12 months -   Fall Risk Score -       Abuse Screen  Patient is not abused    Cognitive Screening   Evaluation of Cognitive Function:  Has your family/caregiver stated any concerns about your memory: yes  Normal, Abnormal    Patient Care Team   Patient Care Team:  Taya Paez MD as PCP - General (Internal Medicine)  Dayanna Burroughs RN as Ambulatory Care Navigator (Internal Medicine)    Assessment/Plan   Education and counseling provided:  End-of-Life planning (with patient's consent)  Pneumococcal Vaccine  Influenza Vaccine  Screening Mammography  Bone mass measurement (DEXA)    Diagnoses and all orders for this visit:    1. Medicare annual wellness visit, subsequent  Pt appears in stable condition       2. Immunization due  -     pneumococcal 13 romel conj dip (PREVNAR 13, PF,) 0.5 mL syrg injection; 0.5 mL by IntraMUSCular route once for 1 dose. Chief Complaint   Patient presents with    Rash     Pt presents with [address] Rash  Rash is getting better on triamcinolone and low dose benedryl 12. 5 mg. She does not have se on benedryl. Rash still not resolved on arms. Daughter is trying to buy cotton garments but today in nylon pants still. Dyspnea  Daughter is noting pt is mouth breathing. She is not getting nebulizer. Pt does not have complaints but daughter is concerned. No swelling in the legs.   No CP        Past Medical History:   Diagnosis Date    Arthritis     Dr. Kiera Rocha [facility])     colon CA in situ    Depression     Hematoma (nontraumatic) of breast     Dr. Sabina freeman breast surgeon    High cholesterol     Hypertension     Osteoporosis     Dr. Rowena Bautista fall fractured vertebrae    Psychiatric disorder     depression    Umbilical hernia      Past Surgical History:   Procedure Laterality Date    HX COLECTOMY  1985    HX HEENT      cateracts bilateral    HX HIP REPLACEMENT  2009    Right    HX ORTHOPAEDIC  2012    T11 & L1 vertebrae fx    HX PELVIC FRACTURE TX  2010     Social History     Social History    Marital status:      Spouse name: N/A    Number of children: N/A    Years of education: N/A     Social History Main Topics    Smoking status: Former Smoker     Packs/day: 0.50     Quit date: 1/1/1970    Smokeless tobacco: Never Used    Alcohol use Yes      Comment: occasional    Drug use: No    Sexual activity: No     Other Topics Concern    None     Social History Narrative    [employer] [employer]    Can cook if she wants        3 children    2 daughters and 1 son        No smoke        Drink- wine, or bourbon or vodka 1 ounce     Family History   Problem Relation Age of Onset    Heart Disease Mother      Current Outpatient Prescriptions   Medication Sig Dispense Refill    diphenhydrAMINE (BENADRYL ALLERGY) 12.5 mg/5 mL syrup Take 10 mL by mouth nightly as needed. Indications: Urticaria 236 mL 0    pneumococcal 13 romel conj dip (PREVNAR 13, PF,) 0.5 mL syrg injection 0.5 mL by IntraMUSCular route once for 1 dose. 0.5 mL 0    triamcinolone acetonide (KENALOG) 0.1 % topical cream Apply  to affected area two (2) times a day. use thin layer 45 g 0    albuterol (PROVENTIL VENTOLIN) 2.5 mg /3 mL (0.083 %) nebulizer solution 3 mL by Nebulization route two (2) times a day. 24 Each 3    guaiFENesin ER (MUCINEX) 600 mg ER tablet Take 1 Tab by mouth two (2) times a day. 60 Tab 1    fluticasone (FLONASE) 50 mcg/actuation nasal spray 2 Sprays by Both Nostrils route daily. 1 Bottle 3    traMADol (ULTRAM) 50 mg tablet Take 1 Tab by mouth every six (6) hours as needed for Pain.  Max Daily Amount: 200 mg. 30 Tab 0    therapeutic multivitamin [facility]) tablet Take 1 Tab by mouth daily.  acetaminophen (TYLENOL) 500 mg tablet Take 1,000 mg by mouth every four (4) hours as needed for Pain.  bismuth subsalicylate (BISMATROL) 262 mg/15 mL suspension Take 30 mL by mouth as needed (After each loose stool).  albuterol (PROVENTIL, VENTOLIN) 2 mg/5 mL syrup Take 5 mL by mouth four (4) times daily as needed. Please give pt if she is coughing. If not improving need rx and nebulizer. 473 mL 2    escitalopram oxalate (LEXAPRO) 10 mg tablet Take 1 Tab by mouth daily. 90 Tab 1    Cholecalciferol, Vitamin D3, 1,000 unit cap Take 1 capsule by mouth daily 90 Cap 1     Allergies   Allergen Reactions    Latex Unknown (comments)     Pt unable to report      Codeine Unknown (comments)    Sulfa Dyne Other (comments)     \"Crying jag\"       Review of Systems - General ROS: negative for - chills, fatigue, fever, hot flashes, malaise or night sweats  Cardiovascular ROS: no chest pain or dyspnea on exertion  Respiratory ROS: no cough, shortness of breath, or wheezing    Visit Vitals    /65 (BP 1 Location: Left arm, BP Patient Position: Sitting)    Pulse 79    Temp 98 °F (36.7 °C) (Oral)    Resp 18    Ht 5' 7\" (1.702 m)    Wt 165 lb (74.8 kg)  Comment: stated    SpO2 96%    BMI 25.84 kg/m2     General Appearance:  Well developed, well nourished,alert and oriented x 3, and individual in no acute distress. Ears/Nose/Mouth/Throat:   Hearing grossly normal.         Neck: Supple, no lad, no bruits   Chest:   Lungs clear to auscultation bilaterally. Cardiovascular:  Regular rate and rhythm, S1, S2 normal, no murmur. Abdomen:   Soft, non-tender, bowel sounds are active. Extremities: No edema bilaterally.     Skin: Warm and dry, no suspicious lesions                 Diagnoses and all orders for this visit:      .Rash  Seems to be improving  Pt still c/o itching will increase to 25 mg and montior  Daughter will assess   ini may need low dose prednisone but SED to pt and daughter  -     diphenhydrAMINE (BENADRYL ALLERGY) 12.5 mg/5 mL syrup; Take 10 mL by mouth nightly as needed. Indications: Urticaria    Wheezing  Will give neb lunch and dinner  Daughter will assess  Will monitor  Was seen by cardiology last year and no CHF  Possible low dose prednisone use ini but SED with pt and daubhter    This note will not be viewable in MyChart.

## 2017-10-19 NOTE — TELEPHONE ENCOUNTER
Mart Hendricks from [facility] [facility] [address] would like a callback in regards to an order they received.  [phone]

## 2017-10-19 NOTE — PATIENT INSTRUCTIONS

## 2017-10-19 NOTE — TELEPHONE ENCOUNTER
Spoke to Hilda Brown May, she stated patient currently on neb treatment of 2 times a day in the morning and at night. Per PCP to change to neb tx of after lunch and before dinner. Hilda Brown verbalized understanding.

## 2017-10-19 NOTE — MR AVS SNAPSHOT
Visit Information Date & Time Provider Department Dept. Phone Encounter #  
 10/19/2017 10:00 AM Dennis Landeros MD [facility] [address] [place] [address] [id number] Upcoming Health Maintenance Date Due ZOSTER VACCINE AGE 60> 9/18/1984 GLAUCOMA SCREENING Q2Y 11/18/1989 MEDICARE YEARLY EXAM 7/15/2017 Pneumococcal 65+ High/Highest Risk (2 of 2 - PPSV23) 11/1/2017 DTaP/Tdap/Td series (2 - Td) 4/28/2024 Allergies as of 10/19/2017  Review Complete On: 10/5/2017 By: Dennis Landeros MD  
  
 Severity Noted Reaction Type Reactions Latex  08/26/2012    Unknown (comments) Pt unable to report Codeine  08/26/2012    Unknown (comments) Sulfa Dyne  08/26/2012    Other (comments) \"Crying jag\" Current Immunizations  Reviewed on 10/5/2017 Name Date Influenza High Dose Vaccine PF 10/5/2017, 10/4/2016 Influenza Vaccine 10/1/2014, 11/1/2012 Pneumococcal Vaccine (Unspecified Type) 11/1/2012 Tdap 4/28/2014  2:44 AM  
  
 Not reviewed this visit You Were Diagnosed With   
  
 Codes Comments Rash    -  Primary ICD-10-CM: R21 
ICD-9-CM: 782.1 Wheezing     ICD-10-CM: R06.2 ICD-9-CM: 786.07 Immunization due     ICD-10-CM: Z23 ICD-9-CM: V05.9 Vitals BP Pulse Temp Resp Height(growth percentile) Weight(growth percentile) 127/65 (BP 1 Location: Left arm, BP Patient Position: Sitting) 79 98 °F (36.7 °C) (Oral) 18 5' 7\" (1.702 m) 165 lb (74.8 kg) SpO2 BMI OB Status Smoking Status 96% 25.84 kg/m2 Postmenopausal Former Smoker BMI and BSA Data Body Mass Index Body Surface Area  
 25.84 kg/m 2 1.88 m 2 Preferred Pharmacy Pharmacy Name Phone [facility] [phone] Your Updated Medication List  
  
   
This list is accurate as of: 10/19/17 10:54 AM.  Always use your most recent med list.  
  
  
  
  
 acetaminophen 500 mg tablet Commonly known as:  TYLENOL Take 1,000 mg by mouth every four (4) hours as needed for Pain. * albuterol 2 mg/5 mL syrup Commonly known as:  Amarjit Buchanan Take 5 mL by mouth four (4) times daily as needed. Please give pt if she is coughing. If not improving need rx and nebulizer. * albuterol 2.5 mg /3 mL (0.083 %) nebulizer solution Commonly known as:  PROVENTIL VENTOLIN  
3 mL by Nebulization route two (2) times a day. BISMATROL 262 mg/15 mL suspension Generic drug:  bismuth subsalicylate Take 30 mL by mouth as needed (After each loose stool). cholecalciferol 1,000 unit Cap Commonly known as:  VITAMIN D3 Take 1 capsule by mouth daily diphenhydrAMINE 12.5 mg/5 mL syrup Commonly known as:  BENADRYL ALLERGY Take 10 mL by mouth nightly as needed. Indications: Urticaria  
  
 escitalopram oxalate 10 mg tablet Commonly known as:  Janeann Ramp Take 1 Tab by mouth daily. fluticasone 50 mcg/actuation nasal spray Commonly known as:  Nicole Serene 2 Sprays by Both Nostrils route daily. guaiFENesin  mg ER tablet Commonly known as:  Samy & Samy Take 1 Tab by mouth two (2) times a day. pneumococcal 13 romel conj dip 0.5 mL Syrg injection Commonly known as:  PREVNAR 13 (PF)  
0.5 mL by IntraMUSCular route once for 1 dose. therapeutic multivitamin tablet Commonly known as:  [facility] Take 1 Tab by mouth daily. traMADol 50 mg tablet Commonly known as:  ULTRAM  
Take 1 Tab by mouth every six (6) hours as needed for Pain. Max Daily Amount: 200 mg.  
  
 triamcinolone acetonide 0.1 % topical cream  
Commonly known as:  KENALOG Apply  to affected area two (2) times a day. use thin layer * Notice: This list has 2 medication(s) that are the same as other medications prescribed for you. Read the directions carefully, and ask your doctor or other care provider to review them with you. Prescriptions Printed Refills  
 pneumococcal 13 romel conj dip (PREVNAR 13, PF,) 0.5 mL syrg injection 0 Si.5 mL by IntraMUSCular route once for 1 dose. Class: Print Route: IntraMUSCular Prescriptions Sent to Pharmacy Refills diphenhydrAMINE (BENADRYL ALLERGY) 12.5 mg/5 mL syrup 0 Sig: Take 10 mL by mouth nightly as needed. Indications: Urticaria Class: Normal  
 Pharmacy: [address] [place] [address] #: [phone] Route: Oral  
  
Patient Instructions Medicare Wellness Visit, Female The best way to live healthy is to have a healthy lifestyle by eating a well-balanced diet, exercising regularly, limiting alcohol and stopping smoking. Regular physical exams and screening tests are another way to keep healthy. Preventive exams provided by your health care provider can find health problems before they become diseases or illnesses. Preventive services including immunizations, screening tests, monitoring and exams can help you take care of your own health. All people over age 72 should have a pneumovax  and and a prevnar shot to prevent pneumonia. These are once in a lifetime unless you and your provider decide differently. All people over 65 should have a yearly flu shot and a tetanus vaccine every 10 years. A bone mass density to screen for osteoporosis or thinning of the bones should be done every 2 years after 65. Screening for diabetes mellitus with a blood sugar test should be done every year. Glaucoma is a disease of the eye due to increased ocular pressure that can lead to blindness and it should be done every year by an eye professional. 
 
Cardiovascular screening tests that check for elevated lipids (fatty part of blood) which can lead to heart disease and strokes should be done every 5 years.  
 
Colorectal screening that evaluates for blood or polyps in your colon should be done yearly as a stool test or every five years as a flexible sigmoidoscope or every 10 years as a colonoscopy up to age 76. Breast cancer screening with a mammogram is recommended biennially  for women age 54-69. Screening for cervical cancer with a pap smear and pelvic exam is recommended for women after age 72 years every 2 years up to age 79 or when the provider and patient decide to stop. If there is a history of cervical abnormalities or other increased risk for cancer then the test is recommended yearly. Hepatitis C screening is also recommended for anyone born between 80 through [address]. A shingles vaccine is also recommended once in a lifetime after age 61. Your Medicare Wellness Exam is recommended annually. Here is a list of your current Health Maintenance items with a due date: 
Health Maintenance Due Topic Date Due  Shingles Vaccine  09/18/1984  Glaucoma Screening   11/18/1989 24 \[facility] [place] [facility]\"" Annual Well Visit  07/15/2017 Introducing [facility]! Alanna Natarajan introduces [employer] patient portal. Now you can access parts of your medical record, email your doctor's office, and request medication refills online. 1. In your internet browser, go to [email or web address]. [employer] 2. Click on the First Time User? Click Here link in the Sign In box. You will see the New Member Sign Up page. 3. Enter your [employer] Access Code exactly as it appears below. You will not need to use this code after youve completed the sign-up process. If you do not sign up before the expiration date, you must request a new code. · [employer] Access Code: [email or web address] Expires: 12/17/2017  1:04 PM 
 
4. Enter the last four digits of your Social Security Number (xxxx) and Date of Birth (mm/dd/yyyy) as indicated and click Submit. You will be taken to the next sign-up page. 5. Create a [employer] ID.  This will be your [employer] login ID and cannot be changed, so think of one that is secure and easy to remember. 6. Create a [employer] password. You can change your password at any time. 7. Enter your Password Reset Question and Answer. This can be used at a later time if you forget your password. 8. Enter your e-mail address. You will receive e-mail notification when new information is available in [address] 9. Click Sign Up. You can now view and download portions of your medical record. 10. Click the Download Summary menu link to download a portable copy of your medical information. If you have questions, please visit the Frequently Asked Questions section of the [employer] website. Remember, [employer] is NOT to be used for urgent needs. For medical emergencies, dial 911. Now available from your iPhone and Android! Please provide this summary of care documentation to your next provider. Your primary care clinician is listed as [facility]. If you have any questions after today's visit, please call [phone].

## 2017-11-04 ENCOUNTER — APPOINTMENT (OUTPATIENT)
Dept: CT IMAGING | Age: 82
End: 2017-11-04
Attending: STUDENT IN AN ORGANIZED HEALTH CARE EDUCATION/TRAINING PROGRAM
Payer: MEDICARE

## 2017-11-04 ENCOUNTER — HOSPITAL ENCOUNTER (EMERGENCY)
Age: 82
Discharge: HOME | End: 2017-11-04
Attending: STUDENT IN AN ORGANIZED HEALTH CARE EDUCATION/TRAINING PROGRAM
Payer: MEDICARE

## 2017-11-04 VITALS
HEIGHT: 67 IN | DIASTOLIC BLOOD PRESSURE: 51 MMHG | BODY MASS INDEX: 26.37 KG/M2 | WEIGHT: 168 LBS | TEMPERATURE: 97.6 F | OXYGEN SATURATION: 96 % | RESPIRATION RATE: 16 BRPM | HEART RATE: 76 BPM | SYSTOLIC BLOOD PRESSURE: 115 MMHG
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
## 2017-11-04 PROCEDURE — 99284 EMERGENCY DEPT VISIT MOD MDM: CPT

## 2017-11-04 PROCEDURE — 74011000250 HC RX REV CODE- 250: Performed by: STUDENT IN AN ORGANIZED HEALTH CARE EDUCATION/TRAINING PROGRAM

## 2017-11-04 PROCEDURE — 94640 AIRWAY INHALATION TREATMENT: CPT

## 2017-11-04 PROCEDURE — 77030029684 HC NEB SM VOL KT MONA -A

## 2017-11-04 PROCEDURE — 70450 CT HEAD/BRAIN W/O DYE: CPT

## 2017-11-04 PROCEDURE — 72125 CT NECK SPINE W/O DYE: CPT

## 2017-11-04 RX ORDER — IBUPROFEN 600 MG/1
600 TABLET ORAL
Qty: 20 TAB | Refills: 0 | Status: SHIPPED | OUTPATIENT
Start: 2017-11-04 | End: 2017-11-09

## 2017-11-04 RX ADMIN — ALBUTEROL SULFATE 1 DOSE: 2.5 SOLUTION RESPIRATORY (INHALATION) at 22:54

## 2017-11-05 NOTE — ED TRIAGE NOTES
TRIAGE: Pt arrives via EMS from [facility] [facility] [address] assisted living after having GLF while getting off of toilet. Pt denies dizziness beforehand but had some after from hitting head. + hematoma to R head. Denies LOC. Denies any other pain or injury. A&X3 at baseline.  No blood thinners

## 2017-11-05 NOTE — ED PROVIDER NOTES
HPI Comments: 80 y.o. female with past medical history significant for colon cancer, arthritis, hypercholesterolemia, HTN, umbilical hernia, and dementia who presents from [facility] via EMS with chief complaint of a fall. Pt's daughter reports Pt was standing from her toilet with help from her aide when she fell forward, hitting her forehead on the \"low\" base of her shower 1-2 hours ago. Daughter states Pt is on Prolia for an old C4 spine fracture. In addition, she takes Children's Benadryl for a systemic itch and as a sleep aid. Pt's daughter denies LOC. There are no other acute medical concerns at this time. Full history, physical exam, and ROS unable to be obtained due to:  dementia. PCP: Felix Lopez MD    Note written by Anahi Good, as dictated by Param Engle MD 8:26 PM    The history is provided by the patient. Past Medical History:   Diagnosis Date    Arthritis     Dr. Aggarwal [facility])     colon CA in situ    Dementia     Depression     Hematoma (nontraumatic) of breast     Dr. Francois freeman breast surgeon    High cholesterol     Hypertension     Osteoporosis     Dr. Nicolasa Aragon fall fractured vertebrae    Psychiatric disorder     depression    Umbilical hernia        Past Surgical History:   Procedure Laterality Date    HX COLECTOMY  1985    HX HEENT      cateracts bilateral    HX HIP REPLACEMENT  2009    Right    HX ORTHOPAEDIC  2012    T11 & L1 vertebrae fx    HX PELVIC FRACTURE [place]  2010         Family History:   Problem Relation Age of Onset    Heart Disease Mother        Social History     Social History    Marital status:      Spouse name: N/A    Number of children: N/A    Years of education: N/A     Occupational History    Not on file.      Social History Main Topics    Smoking status: Former Smoker     Packs/day: 0.50     Quit date: 1/1/1970    Smokeless tobacco: Never Used    Alcohol use Yes      Comment: occasional    Drug use: No    Sexual activity: No     Other Topics Concern    Not on file     Social History Narrative    [facility]    Can cook if she wants        3 children    2 daughters and 1 son        No smoke        Drink- wine, or bourbon or vodka 1 ounce         ALLERGIES: Latex; Codeine; and Sulfa dyne    Review of Systems   Unable to perform ROS: Dementia       Vitals:    11/04/17 2003 11/04/17 2015 11/04/17 2100   BP: 99/42 107/54 116/54   Pulse: 72     Resp: 16     Temp: 97.6 °F (36.4 °C)     SpO2: 97% 99%             Physical Exam   Constitutional: She is oriented to person, place, and time. She appears well-developed and well-nourished. No distress. HENT:   Head: Normocephalic. Nose: Nose normal.   Mouth/Throat: Oropharynx is clear and moist. No oropharyngeal exudate. Hematoma over R forehead. Eyes: Conjunctivae and EOM are normal. Right eye exhibits no discharge. Left eye exhibits no discharge. No scleral icterus. Neck: Normal range of motion. Neck supple. No JVD present. No tracheal deviation present. No thyromegaly present. No C-spine tenderness. Cardiovascular: Normal rate, regular rhythm, normal heart sounds and intact distal pulses. Exam reveals no gallop and no friction rub. No murmur heard. Pulmonary/Chest: Effort normal and breath sounds normal. No stridor. No respiratory distress. She has no wheezes. She has no rales. She exhibits no tenderness. Abdominal: Bowel sounds are normal. She exhibits no distension and no mass. There is no tenderness. There is no rebound. Musculoskeletal: Normal range of motion. She exhibits no edema or tenderness. Lymphadenopathy:     She has no cervical adenopathy. Neurological: She is alert and oriented to person, place, and time. No cranial nerve deficit. Coordination normal.   Skin: Skin is warm and dry. No rash noted. She is not diaphoretic. No erythema. No pallor. Psychiatric: She has a normal mood and affect.  Her behavior is normal. Judgment and thought content normal.   Note written by Anahi Floyd, as dictated by Savage Mosqueda MD 8:26 PM    MDM  Number of Diagnoses or Management Options  Fall, initial encounter:      Amount and/or Complexity of Data Reviewed  Tests in the radiology section of CPT®: ordered and reviewed  Obtain history from someone other than the patient: yes  Review and summarize past medical records: yes    Risk of Complications, Morbidity, and/or Mortality  Presenting problems: moderate  Diagnostic procedures: moderate  Management options: moderate    Patient Progress  Patient progress: stable    ED Course       Procedures

## 2017-11-05 NOTE — DISCHARGE INSTRUCTIONS

## 2017-11-05 NOTE — ED NOTES
Discharge instructions given to patient by MD and nurse. Pt has been given counseling on medication use and verbalizes understanding. Transport set up with assisted living set up with [facility] with ETA 2300      10:53 PM  Transport here to take pt home. Daughter at bedside aware of plan of care. Pt in no signs of distress but receiving neb treatment prior to arrival for minor wheezing.  Vital signs stable and pt transported off of unit in no signs of distress

## 2017-11-09 ENCOUNTER — OFFICE VISIT (OUTPATIENT)
Dept: INTERNAL MEDICINE CLINIC | Age: 82
End: 2017-11-09

## 2017-11-09 VITALS
SYSTOLIC BLOOD PRESSURE: 131 MMHG | HEART RATE: 75 BPM | RESPIRATION RATE: 16 BRPM | HEIGHT: 67 IN | TEMPERATURE: 98.1 F | DIASTOLIC BLOOD PRESSURE: 69 MMHG | OXYGEN SATURATION: 94 %

## 2017-11-09 DIAGNOSIS — S00.93XA TRAUMATIC HEMATOMA OF HEAD, INITIAL ENCOUNTER: ICD-10-CM

## 2017-11-09 DIAGNOSIS — R05.9 COUGH: Primary | ICD-10-CM

## 2017-11-09 DIAGNOSIS — L29.9 ITCHING: ICD-10-CM

## 2017-11-09 RX ORDER — GUAIFENESIN 600 MG/1
600 TABLET, EXTENDED RELEASE ORAL 2 TIMES DAILY
Qty: 60 TAB | Refills: 1 | Status: SHIPPED | OUTPATIENT
Start: 2017-11-09 | End: 2019-01-01

## 2017-11-09 RX ORDER — AZITHROMYCIN 250 MG/1
250 TABLET, FILM COATED ORAL SEE ADMIN INSTRUCTIONS
Qty: 6 TAB | Refills: 0 | Status: SHIPPED | OUTPATIENT
Start: 2017-11-09 | End: 2017-11-14

## 2017-11-09 RX ORDER — PREDNISONE 10 MG/1
TABLET ORAL
Qty: 30 TAB | Refills: 0 | Status: SHIPPED | OUTPATIENT
Start: 2017-11-09 | End: 2018-01-01 | Stop reason: ALTCHOICE

## 2017-11-09 NOTE — PROGRESS NOTES
Chief Complaint   Patient presents with    Wheezing     Getting worse     Asthma  Current control: Fair   Current level: mild intermittent  Current symptoms: wheezing  Current controller: none, pt can not optimally manipulate with hands  Last flare up: current. Number of flare ups in past year:> 2 years ago  Current symptom relief med: albuterol nebulizer    Itching  Pt is still itching. She is given the benedryl with mild improvement      Subjective:   Carmelo Recio is a 80 y.o. female with hypertension. Hypertension ROS: taking medications as instructed, no medication side effects noted, no TIA's, no chest pain on exertion, no dyspnea on exertion, no swelling of ankles. New concerns: none. Fall  Pt reports she fell when she was in the bathroom and hit her right side of her head. She did not lose consciousness. She also fell when she was using her wheelchair as a walker and [facility] into [facility] giving medications and pt fell on her bottom.             Past Medical History:   Diagnosis Date    Arthritis     Dr. Geraldine Sahu [facility])     colon CA in situ    Dementia     Depression     Hematoma (nontraumatic) of breast     Dr. Jennifer freeman breast surgeon    High cholesterol     Hypertension     Osteoporosis     Dr. Sosa Kruger fall fractured vertebrae    Psychiatric disorder     depression    Umbilical hernia      Past Surgical History:   Procedure Laterality Date    HX COLECTOMY  1985    HX HEENT      cateracts bilateral    HX HIP REPLACEMENT  2009    Right    HX ORTHOPAEDIC  2012    T11 & L1 vertebrae fx    HX PELVIC FRACTURE [place]  2010     Social History     Social History    Marital status:      Spouse name: N/A    Number of children: N/A    Years of education: N/A     Social History Main Topics    Smoking status: Former Smoker     Packs/day: 0.50     Quit date: 1/1/1970    Smokeless tobacco: Never Used    Alcohol use Yes      Comment: occasional    Drug use: No    Sexual activity: No     Other Topics Concern    None     Social History Narrative    [facility]    Can cook if she wants        3 children    2 daughters and 1 son        No smoke        Drink- wine, or bourbon or vodka 1 ounce     Family History   Problem Relation Age of Onset    Heart Disease Mother      Current Outpatient Prescriptions   Medication Sig Dispense Refill    denosumab (PROLIA) 60 mg/mL injection 60 mg by SubCUTAneous route.  guaiFENesin ER (MUCINEX) 600 mg ER tablet Take 1 Tab by mouth two (2) times a day. Follow with 6 oz of water 60 Tab 1    predniSONE (DELTASONE) 10 mg tablet Take 4 tab for 3 days then 3 tabs for 3 days then 2 tabs for 2 days then 1 tab 30 Tab 0    azithromycin (ZITHROMAX) 250 mg tablet Take 1 Tab by mouth See Admin Instructions for 5 days. Do not give at the same time as lexapro 6 Tab 0    diphenhydrAMINE (BENADRYL ALLERGY) 12.5 mg/5 mL syrup Take 10 mL by mouth nightly as needed. Indications: Urticaria 236 mL 0    triamcinolone acetonide (KENALOG) 0.1 % topical cream Apply  to affected area two (2) times a day. use thin layer 45 g 0    albuterol (PROVENTIL VENTOLIN) 2.5 mg /3 mL (0.083 %) nebulizer solution 3 mL by Nebulization route two (2) times a day. 24 Each 3    fluticasone (FLONASE) 50 mcg/actuation nasal spray 2 Sprays by Both Nostrils route daily. 1 Bottle 3    traMADol (ULTRAM) 50 mg tablet Take 1 Tab by mouth every six (6) hours as needed for Pain. Max Daily Amount: 200 mg. 30 Tab 0    therapeutic multivitamin (THERAGRAN) tablet Take 1 Tab by mouth daily.  albuterol (PROVENTIL, VENTOLIN) 2 mg/5 mL syrup Take 5 mL by mouth four (4) times daily as needed. Please give pt if she is coughing. If not improving need rx and nebulizer. 473 mL 2    escitalopram oxalate (LEXAPRO) 10 mg tablet Take 1 Tab by mouth daily.  90 Tab 1    Cholecalciferol, Vitamin D3, 1,000 unit cap Take 1 capsule by mouth daily 90 Cap 1    ibuprofen (MOTRIN) 600 mg tablet Take 1 Tab by mouth every six (6) hours as needed for Pain for up to 5 days. 20 Tab 0    acetaminophen (TYLENOL) 500 mg tablet Take 1,000 mg by mouth every four (4) hours as needed for Pain.  bismuth subsalicylate (BISMATROL) 262 mg/15 mL suspension Take 30 mL by mouth as needed (After each loose stool). Allergies   Allergen Reactions    Latex Unknown (comments)     Pt unable to report      Codeine Unknown (comments)    Neosporin [Benzalkonium Chloride] Rash    Sulfa Dyne Other (comments)     \"Crying jag\"       Review of Systems - General ROS: negative for - chills, fatigue, fever, hot flashes, malaise or night sweats  Cardiovascular ROS: no chest pain or dyspnea on exertion  Respiratory ROS: no cough, shortness of breath, or wheezing    Visit Vitals    /69 (BP 1 Location: Left arm, BP Patient Position: Sitting)    Pulse 75    Temp 98.1 °F (36.7 °C) (Oral)    Resp 16    Ht 5' 7\" (1.702 m)    SpO2 94%     General Appearance:  Well developed, well nourished,alert and oriented x 3, and individual in no acute distress. she has  expiratory wheezing while sitting in wheelchair   Ears/Nose/Mouth/Throat:   Hearing grossly normal.  Right scalp hematoma pain on palpation no cranial depression on palpation         Neck: Supple, no lad, no bruits   Chest:   Lungs clear to auscultation bilaterally. Cardiovascular:  Regular rate and rhythm, S1, S2 normal, no murmur. Abdomen:   Soft, non-tender, bowel sounds are active. Extremities: No edema bilaterally. Skin: Warm and dry, no suspicious lesions                 Diagnoses and all orders for this visit:    1. Cough/RAD/Asthma  Will treat with prednisone and zpack for early infection and rad  I asked Reid Schultz to reach out to let me know how mother/pt is doing  -     guaiFENesin ER (MUCINEX) 600 mg ER tablet; Take 1 Tab by mouth two (2) times a day. Follow with 6 oz of water  -     predniSONE (DELTASONE) 10 mg tablet;  Take 4 tab for 3 days then 3 tabs for 3 days then 2 tabs for 2 days then 1 tab  -     azithromycin (ZITHROMAX) 250 mg tablet; Take 1 Tab by mouth See Admin Instructions for 5 days. Do not give at the same time as lexapro    2. Traumatic hematoma of head, initial encounter  Continue to monitor mental status  Discussion with Meli De La Rosa re: potentially get bed rails to prevent her from climbing out of bed but she may be more at risk if she climbs over the rails. Daughter will check in to. Discussed use of signs and moving wheelchair to decrease incentive to use wheelchair to move around    3. Itching  Hold benedryl for now  On prednisone    This [facility]able [facility] I spent 25 min with this patient and >50% of the time was spent on counseling and management of RAD, SE of prednisone, fall risk and potential solutions. Will have OT lgauate as well. This note will not be viewable in [address]

## 2017-11-09 NOTE — MR AVS SNAPSHOT
Visit Information Date & Time Provider Department Dept. Phone Encounter #  
 11/9/2017 11:40 AM Michelle Germain MD [facility] [id number] Upcoming Health Maintenance Date Due ZOSTER VACCINE AGE 60> 9/18/1984 GLAUCOMA SCREENING Q2Y 11/18/1989 Pneumococcal 65+ High/Highest Risk (2 of 2 - PPSV23) 11/1/2017 MEDICARE YEARLY EXAM 10/20/2018 DTaP/Tdap/Td series (2 - Td) 4/28/2024 Allergies as of 11/9/2017  Review Complete On: 11/9/2017 By: Michelle Germain MD  
  
 Severity Noted Reaction Type Reactions Latex  08/26/2012    Unknown (comments) Pt unable to report Codeine  08/26/2012    Unknown (comments) Neosporin [Benzalkonium Chloride]  11/09/2017    Rash  
 Sulfa Dyne  08/26/2012    Other (comments) \"Crying jag\" Current Immunizations  Reviewed on 10/5/2017 Name Date Influenza High Dose Vaccine PF 10/5/2017, 10/4/2016 Influenza Vaccine 10/1/2014, 11/1/2012 Pneumococcal Vaccine (Unspecified Type) 11/1/2012 Tdap 4/28/2014  2:44 AM  
  
 Not reviewed this visit You Were Diagnosed With   
  
 Codes Comments Cough     ICD-10-CM: R05 ICD-9-CM: 558. 2 Vitals BP Pulse Temp Resp Height(growth percentile) SpO2  
 131/69 (BP 1 Location: Left arm, BP Patient Position: Sitting) 75 98.1 °F (36.7 °C) (Oral) 16 5' 7\" (1.702 m) 94% OB Status Smoking Status Postmenopausal Former Smoker Preferred Pharmacy Pharmacy Name Phone [facility] [phone] Your Updated Medication List  
  
   
This list is accurate as of: 11/9/17  1:04 PM.  Always use your most recent med list.  
  
  
  
  
 acetaminophen 500 mg tablet Commonly known as:  TYLENOL Take 1,000 mg by mouth every four (4) hours as needed for Pain. * albuterol 2 mg/5 mL syrup Commonly known as:  Okeefe Greaser  
 Take 5 mL by mouth four (4) times daily as needed. Please give pt if she is coughing. If not improving need rx and nebulizer. * albuterol 2.5 mg /3 mL (0.083 %) nebulizer solution Commonly known as:  PROVENTIL VENTOLIN  
3 mL by Nebulization route two (2) times a day. azithromycin 250 mg tablet Commonly known as:  Holloway Atkinson Take 1 Tab by mouth See Admin Instructions for 5 days. Do not give at the same time as lexapro BISMATROL 262 mg/15 mL suspension Generic drug:  bismuth subsalicylate Take 30 mL by mouth as needed (After each loose stool). cholecalciferol 1,000 unit Cap Commonly known as:  VITAMIN D3 Take 1 capsule by mouth daily diphenhydrAMINE 12.5 mg/5 mL syrup Commonly known as:  BENADRYL ALLERGY Take 10 mL by mouth nightly as needed. Indications: Urticaria  
  
 escitalopram oxalate 10 mg tablet Commonly known as:  CherEvaneos Jews Take 1 Tab by mouth daily. fluticasone 50 mcg/actuation nasal spray Commonly known as:  Miguel Riser 2 Sprays by Both Nostrils route daily. guaiFENesin  mg ER tablet Commonly known as:  Samy & Samy Take 1 Tab by mouth two (2) times a day. Follow with 6 oz of water  
  
 ibuprofen 600 mg tablet Commonly known as:  MOTRIN Take 1 Tab by mouth every six (6) hours as needed for Pain for up to 5 days. predniSONE 10 mg tablet Commonly known as:  Anmol Esters Take 4 tab for 3 days then 3 tabs for 3 days then 2 tabs for 2 days then 1 tab PROLIA 60 mg/mL injection Generic drug:  denosumab 60 mg by SubCUTAneous route. therapeutic multivitamin tablet Commonly known as:  [facility] Take 1 Tab by mouth daily. traMADol 50 mg tablet Commonly known as:  ULTRAM  
Take 1 Tab by mouth every six (6) hours as needed for Pain. Max Daily Amount: 200 mg.  
  
 triamcinolone acetonide 0.1 % topical cream  
Commonly known as:  KENALOG Apply  to affected area two (2) times a day. use thin layer * Notice: This list has 2 medication(s) that are the same as other medications prescribed for you. Read the directions carefully, and ask your doctor or other care provider to review them with you. Prescriptions Sent to Pharmacy Refills  
 guaiFENesin ER (MUCINEX) 600 mg ER tablet 1 Sig: Take 1 Tab by mouth two (2) times a day. Follow with 6 oz of water Class: Normal  
 Pharmacy: [address] [place] [address] Ph #: [phone] Route: Oral  
 predniSONE (DELTASONE) 10 mg tablet 0 Sig: Take 4 tab for 3 days then 3 tabs for 3 days then 2 tabs for 2 days then 1 tab Class: Normal  
 Pharmacy: [address] [place] [address] Ph #: [phone]  
 azithromycin (ZITHROMAX) 250 mg tablet 0 Sig: Take 1 Tab by mouth See Admin Instructions for 5 days. Do not give at the same time as lexapro Class: Normal  
 Pharmacy: [address] [place] [address] Ph #: [phone] Route: Oral  
  
Introducing [facility]! Dear Sukhjinder Frank: Thank you for requesting a [employer] account. Our records indicate that you already have an active [employer] account. You can access your account anytime at [email or web address] Did you know that you can access your hospital and ER discharge instructions at any time in [employer]? You can also review all of your test results from your hospital stay or ER visit. Additional Information If you have questions, please visit the Frequently Asked Questions section of the [employer] website at [email or web address] Remember, Ot[employer]ami is NOT to be used for urgent needs. For medical emergencies, dial 911. Now available from your iPhone and Android! Please provide this summary of care documentation to your next provider. Your primary care clinician is listed as Ann Barlow.  If you have any questions after today's visit, please call [phone].

## 2018-01-01 ENCOUNTER — TELEPHONE (OUTPATIENT)
Dept: INTERNAL MEDICINE CLINIC | Age: 83
End: 2018-01-01

## 2018-01-01 ENCOUNTER — APPOINTMENT (OUTPATIENT)
Dept: CT IMAGING | Age: 83
End: 2018-01-01
Attending: EMERGENCY MEDICINE
Payer: MEDICARE

## 2018-01-01 ENCOUNTER — APPOINTMENT (OUTPATIENT)
Dept: GENERAL RADIOLOGY | Age: 83
End: 2018-01-01
Attending: EMERGENCY MEDICINE
Payer: MEDICARE

## 2018-01-01 ENCOUNTER — PATIENT OUTREACH (OUTPATIENT)
Dept: INTERNAL MEDICINE CLINIC | Age: 83
End: 2018-01-01

## 2018-01-01 ENCOUNTER — OFFICE VISIT (OUTPATIENT)
Dept: INTERNAL MEDICINE CLINIC | Age: 83
End: 2018-01-01

## 2018-01-01 ENCOUNTER — HOSPITAL ENCOUNTER (EMERGENCY)
Age: 83
Discharge: HOME | End: 2018-08-22
Attending: EMERGENCY MEDICINE
Payer: MEDICARE

## 2018-01-01 ENCOUNTER — HOSPITAL ENCOUNTER (EMERGENCY)
Age: 83
Discharge: HOME | End: 2018-09-07
Attending: EMERGENCY MEDICINE
Payer: MEDICARE

## 2018-01-01 ENCOUNTER — HOSPITAL ENCOUNTER (EMERGENCY)
Age: 83
Discharge: HOME | End: 2018-09-01
Attending: EMERGENCY MEDICINE
Payer: MEDICARE

## 2018-01-01 VITALS
TEMPERATURE: 97.7 F | BODY MASS INDEX: 28.32 KG/M2 | HEART RATE: 63 BPM | OXYGEN SATURATION: 94 % | DIASTOLIC BLOOD PRESSURE: 51 MMHG | SYSTOLIC BLOOD PRESSURE: 133 MMHG | RESPIRATION RATE: 16 BRPM | WEIGHT: 165 LBS

## 2018-01-01 VITALS
OXYGEN SATURATION: 96 % | RESPIRATION RATE: 18 BRPM | HEART RATE: 79 BPM | BODY MASS INDEX: 26.52 KG/M2 | WEIGHT: 165 LBS | SYSTOLIC BLOOD PRESSURE: 123 MMHG | TEMPERATURE: 98.2 F | DIASTOLIC BLOOD PRESSURE: 74 MMHG | HEIGHT: 66 IN

## 2018-01-01 VITALS
RESPIRATION RATE: 16 BRPM | HEIGHT: 66 IN | SYSTOLIC BLOOD PRESSURE: 119 MMHG | OXYGEN SATURATION: 97 % | HEART RATE: 90 BPM | DIASTOLIC BLOOD PRESSURE: 63 MMHG | BODY MASS INDEX: 26.52 KG/M2 | TEMPERATURE: 98.3 F | WEIGHT: 165 LBS

## 2018-01-01 VITALS
HEIGHT: 64 IN | RESPIRATION RATE: 14 BRPM | TEMPERATURE: 98.2 F | WEIGHT: 165 LBS | DIASTOLIC BLOOD PRESSURE: 76 MMHG | BODY MASS INDEX: 28.17 KG/M2 | SYSTOLIC BLOOD PRESSURE: 118 MMHG | OXYGEN SATURATION: 97 % | HEART RATE: 77 BPM

## 2018-01-01 VITALS
WEIGHT: 165 LBS | TEMPERATURE: 97.9 F | HEIGHT: 66 IN | BODY MASS INDEX: 26.52 KG/M2 | DIASTOLIC BLOOD PRESSURE: 62 MMHG | RESPIRATION RATE: 16 BRPM | SYSTOLIC BLOOD PRESSURE: 122 MMHG | HEART RATE: 72 BPM | OXYGEN SATURATION: 98 %

## 2018-01-01 DIAGNOSIS — L03.818 CELLULITIS OF OTHER SPECIFIED SITE: ICD-10-CM

## 2018-01-01 DIAGNOSIS — R06.2 WHEEZING: ICD-10-CM

## 2018-01-01 DIAGNOSIS — S09.90XA INJURY OF HEAD, INITIAL ENCOUNTER: Primary | ICD-10-CM

## 2018-01-01 DIAGNOSIS — S81.812A LACERATION OF LEFT LOWER EXTREMITY, INITIAL ENCOUNTER: ICD-10-CM
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
## 2018-01-01 DIAGNOSIS — L30.9 ECZEMA, UNSPECIFIED TYPE: Primary | ICD-10-CM

## 2018-01-01 DIAGNOSIS — I10 ESSENTIAL HYPERTENSION: Chronic | ICD-10-CM

## 2018-01-01 DIAGNOSIS — S09.90XA CLOSED HEAD INJURY, INITIAL ENCOUNTER: Primary | ICD-10-CM

## 2018-01-01 DIAGNOSIS — S09.90XA INJURY OF HEAD, INITIAL ENCOUNTER: ICD-10-CM
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
## 2018-01-01 DIAGNOSIS — Z91.81 RISK FOR FALLS: ICD-10-CM

## 2018-01-01 DIAGNOSIS — I10 ESSENTIAL HYPERTENSION: ICD-10-CM

## 2018-01-01 LAB
ALBUMIN SERPL-MCNC: 3.1 G/DL (ref 3.5–5)
ALBUMIN/GLOB SERPL: 1 {RATIO} (ref 1.1–2.2)
ALP SERPL-CCNC: 68 U/L (ref 45–117)
ALT SERPL-CCNC: 25 U/L (ref 12–78)
ANION GAP SERPL CALC-SCNC: 5 MMOL/L (ref 5–15)
ANION GAP SERPL CALC-SCNC: 6 MMOL/L (ref 5–15)
APPEARANCE UR: CLEAR
APPEARANCE UR: CLEAR
AST SERPL-CCNC: 14 U/L (ref 15–37)
ATRIAL RATE: 43 BPM
ATRIAL RATE: 69 BPM
BACTERIA URNS QL MICRO: NEGATIVE /HPF
BACTERIA URNS QL MICRO: NEGATIVE /HPF
BASOPHILS # BLD: 0.1 K/UL (ref 0–0.1)
BASOPHILS # BLD: 0.1 K/UL (ref 0–0.1)
BASOPHILS NFR BLD: 1 % (ref 0–1)
BASOPHILS NFR BLD: 1 % (ref 0–1)
BILIRUB SERPL-MCNC: 0.3 MG/DL (ref 0.2–1)
BILIRUB UR QL: NEGATIVE
BILIRUB UR QL: NEGATIVE
BUN SERPL-MCNC: 19 MG/DL (ref 6–20)
BUN SERPL-MCNC: 20 MG/DL (ref 6–20)
BUN/CREAT SERPL: 19 (ref 12–20)
BUN/CREAT SERPL: 22 (ref 12–20)
CALCIUM SERPL-MCNC: 8.8 MG/DL (ref 8.5–10.1)
CALCIUM SERPL-MCNC: 9 MG/DL (ref 8.5–10.1)
CALCULATED P AXIS, ECG09: 37 DEGREES
CALCULATED R AXIS, ECG10: -35 DEGREES
CALCULATED R AXIS, ECG10: -36 DEGREES
CALCULATED T AXIS, ECG11: 22 DEGREES
CALCULATED T AXIS, ECG11: 44 DEGREES
CHLORIDE SERPL-SCNC: 104 MMOL/L (ref 97–108)
CHLORIDE SERPL-SCNC: 106 MMOL/L (ref 97–108)
CO2 SERPL-SCNC: 30 MMOL/L (ref 21–32)
CO2 SERPL-SCNC: 31 MMOL/L (ref 21–32)
COLOR UR: NORMAL
COLOR UR: NORMAL
COMMENT, HOLDF: NORMAL
CREAT SERPL-MCNC: 0.85 MG/DL (ref 0.55–1.02)
CREAT SERPL-MCNC: 1.03 MG/DL (ref 0.55–1.02)
DIAGNOSIS, 93000: NORMAL
DIAGNOSIS, 93000: NORMAL
DIFFERENTIAL METHOD BLD: ABNORMAL
DIFFERENTIAL METHOD BLD: ABNORMAL
EOSINOPHIL # BLD: 1 K/UL (ref 0–0.4)
EOSINOPHIL # BLD: 1.4 K/UL (ref 0–0.4)
EOSINOPHIL NFR BLD: 15 % (ref 0–7)
EOSINOPHIL NFR BLD: 7 % (ref 0–7)
EPITH CASTS URNS QL MICRO: NORMAL /LPF
EPITH CASTS URNS QL MICRO: NORMAL /LPF
ERYTHROCYTE [DISTWIDTH] IN BLOOD BY AUTOMATED COUNT: 14.6 % (ref 11.5–14.5)
ERYTHROCYTE [DISTWIDTH] IN BLOOD BY AUTOMATED COUNT: 14.8 % (ref 11.5–14.5)
GLOBULIN SER CALC-MCNC: 3.1 G/DL (ref 2–4)
GLUCOSE SERPL-MCNC: 86 MG/DL (ref 65–100)
GLUCOSE SERPL-MCNC: 94 MG/DL (ref 65–100)
GLUCOSE UR STRIP.AUTO-MCNC: NEGATIVE MG/DL
GLUCOSE UR STRIP.AUTO-MCNC: NEGATIVE MG/DL
HCT VFR BLD AUTO: 37.6 % (ref 35–47)
HCT VFR BLD AUTO: 43.7 % (ref 35–47)
HGB BLD-MCNC: 11.5 G/DL (ref 11.5–16)
HGB BLD-MCNC: 13.4 G/DL (ref 11.5–16)
HGB UR QL STRIP: NEGATIVE
HGB UR QL STRIP: NEGATIVE
HYALINE CASTS URNS QL MICRO: NORMAL /LPF (ref 0–5)
HYALINE CASTS URNS QL MICRO: NORMAL /LPF (ref 0–5)
IMM GRANULOCYTES # BLD: 0.1 K/UL (ref 0–0.04)
IMM GRANULOCYTES # BLD: 0.3 K/UL (ref 0–0.04)
IMM GRANULOCYTES NFR BLD AUTO: 1 % (ref 0–0.5)
IMM GRANULOCYTES NFR BLD AUTO: 2 % (ref 0–0.5)
KETONES UR QL STRIP.AUTO: NEGATIVE MG/DL
KETONES UR QL STRIP.AUTO: NEGATIVE MG/DL
LEUKOCYTE ESTERASE UR QL STRIP.AUTO: NEGATIVE
LEUKOCYTE ESTERASE UR QL STRIP.AUTO: NEGATIVE
LYMPHOCYTES # BLD: 1.7 K/UL (ref 0.8–3.5)
LYMPHOCYTES # BLD: 2.4 K/UL (ref 0.8–3.5)
LYMPHOCYTES NFR BLD: 17 % (ref 12–49)
LYMPHOCYTES NFR BLD: 18 % (ref 12–49)
MCH RBC QN AUTO: 28.6 PG (ref 26–34)
MCH RBC QN AUTO: 28.9 PG (ref 26–34)
MCHC RBC AUTO-ENTMCNC: 30.6 G/DL (ref 30–36.5)
MCHC RBC AUTO-ENTMCNC: 30.7 G/DL (ref 30–36.5)
MCV RBC AUTO: 93.5 FL (ref 80–99)
MCV RBC AUTO: 94.2 FL (ref 80–99)
MONOCYTES # BLD: 1.1 K/UL (ref 0–1)
MONOCYTES # BLD: 1.5 K/UL (ref 0–1)
MONOCYTES NFR BLD: 11 % (ref 5–13)
MONOCYTES NFR BLD: 12 % (ref 5–13)
NEUTS SEG # BLD: 4.8 K/UL (ref 1.8–8)
NEUTS SEG # BLD: 8.5 K/UL (ref 1.8–8)
NEUTS SEG NFR BLD: 53 % (ref 32–75)
NEUTS SEG NFR BLD: 62 % (ref 32–75)
NITRITE UR QL STRIP.AUTO: NEGATIVE
NITRITE UR QL STRIP.AUTO: NEGATIVE
NRBC # BLD: 0 K/UL (ref 0–0.01)
NRBC # BLD: 0 K/UL (ref 0–0.01)
NRBC BLD-RTO: 0 PER 100 WBC
NRBC BLD-RTO: 0 PER 100 WBC
P-R INTERVAL, ECG05: 160 MS
PH UR STRIP: 6 [PH] (ref 5–8)
PH UR STRIP: 6 [PH] (ref 5–8)
PLATELET # BLD AUTO: 249 K/UL (ref 150–400)
PLATELET # BLD AUTO: 283 K/UL (ref 150–400)
PMV BLD AUTO: 10 FL (ref 8.9–12.9)
PMV BLD AUTO: 9.7 FL (ref 8.9–12.9)
POTASSIUM SERPL-SCNC: 4.3 MMOL/L (ref 3.5–5.1)
POTASSIUM SERPL-SCNC: 4.4 MMOL/L (ref 3.5–5.1)
PROT SERPL-MCNC: 6.2 G/DL (ref 6.4–8.2)
PROT UR STRIP-MCNC: NEGATIVE MG/DL
PROT UR STRIP-MCNC: NEGATIVE MG/DL
Q-T INTERVAL, ECG07: 400 MS
Q-T INTERVAL, ECG07: 446 MS
QRS DURATION, ECG06: 74 MS
QRS DURATION, ECG06: 82 MS
QTC CALCULATION (BEZET), ECG08: 428 MS
QTC CALCULATION (BEZET), ECG08: 471 MS
RBC # BLD AUTO: 4.02 M/UL (ref 3.8–5.2)
RBC # BLD AUTO: 4.64 M/UL (ref 3.8–5.2)
RBC #/AREA URNS HPF: NORMAL /HPF (ref 0–5)
RBC #/AREA URNS HPF: NORMAL /HPF (ref 0–5)
RBC MORPH BLD: ABNORMAL
SAMPLES BEING HELD,HOLD: NORMAL
SODIUM SERPL-SCNC: 139 MMOL/L (ref 136–145)
SODIUM SERPL-SCNC: 143 MMOL/L (ref 136–145)
SP GR UR REFRACTOMETRY: 1.01 (ref 1–1.03)
SP GR UR REFRACTOMETRY: 1.02 (ref 1–1.03)
UR CULT HOLD, URHOLD: NORMAL
UR CULT HOLD, URHOLD: NORMAL
UROBILINOGEN UR QL STRIP.AUTO: 0.2 EU/DL (ref 0.2–1)
UROBILINOGEN UR QL STRIP.AUTO: 0.2 EU/DL (ref 0.2–1)
VENTRICULAR RATE, ECG03: 67 BPM
VENTRICULAR RATE, ECG03: 69 BPM
WBC # BLD AUTO: 13.8 K/UL (ref 3.6–11)
WBC # BLD AUTO: 9.2 K/UL (ref 3.6–11)
WBC URNS QL MICRO: NORMAL /HPF (ref 0–4)
WBC URNS QL MICRO: NORMAL /HPF (ref 0–4)

## 2018-01-01 PROCEDURE — 90471 IMMUNIZATION ADMIN: CPT

## 2018-01-01 PROCEDURE — 85025 COMPLETE CBC W/AUTO DIFF WBC: CPT | Performed by: EMERGENCY MEDICINE

## 2018-01-01 PROCEDURE — 70450 CT HEAD/BRAIN W/O DYE: CPT

## 2018-01-01 PROCEDURE — 77030013140 HC MSK NEB VYRM -A

## 2018-01-01 PROCEDURE — 99285 EMERGENCY DEPT VISIT HI MDM: CPT

## 2018-01-01 PROCEDURE — 99284 EMERGENCY DEPT VISIT MOD MDM: CPT

## 2018-01-01 PROCEDURE — 80053 COMPREHEN METABOLIC PANEL: CPT | Performed by: EMERGENCY MEDICINE

## 2018-01-01 PROCEDURE — 77030018836 HC SOL IRR NACL ICUM -A

## 2018-01-01 PROCEDURE — 77030011943

## 2018-01-01 PROCEDURE — 71046 X-RAY EXAM CHEST 2 VIEWS: CPT

## 2018-01-01 PROCEDURE — 36415 COLL VENOUS BLD VENIPUNCTURE: CPT | Performed by: EMERGENCY MEDICINE

## 2018-01-01 PROCEDURE — 72125 CT NECK SPINE W/O DYE: CPT

## 2018-01-01 PROCEDURE — 71045 X-RAY EXAM CHEST 1 VIEW: CPT

## 2018-01-01 PROCEDURE — 73590 X-RAY EXAM OF LOWER LEG: CPT

## 2018-01-01 PROCEDURE — 74011250637 HC RX REV CODE- 250/637: Performed by: EMERGENCY MEDICINE

## 2018-01-01 PROCEDURE — 93005 ELECTROCARDIOGRAM TRACING: CPT

## 2018-01-01 PROCEDURE — 77030002916 HC SUT ETHLN J&J -A

## 2018-01-01 PROCEDURE — 74011250636 HC RX REV CODE- 250/636: Performed by: EMERGENCY MEDICINE

## 2018-01-01 PROCEDURE — 74011000250 HC RX REV CODE- 250: Performed by: EMERGENCY MEDICINE

## 2018-01-01 PROCEDURE — 94640 AIRWAY INHALATION TREATMENT: CPT

## 2018-01-01 PROCEDURE — 81001 URINALYSIS AUTO W/SCOPE: CPT | Performed by: EMERGENCY MEDICINE

## 2018-01-01 PROCEDURE — 90715 TDAP VACCINE 7 YRS/> IM: CPT | Performed by: EMERGENCY MEDICINE

## 2018-01-01 PROCEDURE — 75810000293 HC SIMP/SUPERF WND  RPR

## 2018-01-01 PROCEDURE — 80048 BASIC METABOLIC PNL TOTAL CA: CPT | Performed by: EMERGENCY MEDICINE

## 2018-01-01 PROCEDURE — 73562 X-RAY EXAM OF KNEE 3: CPT

## 2018-01-01 RX ORDER — PREDNISONE 2.5 MG/1
TABLET ORAL
Qty: 21 TAB | Refills: 0 | Status: SHIPPED | OUTPATIENT
Start: 2018-01-01 | End: 2019-01-01

## 2018-01-01 RX ORDER — BACITRACIN 500 UNIT/G
1 PACKET (EA) TOPICAL
Status: COMPLETED | OUTPATIENT
Start: 2018-01-01 | End: 2018-01-01

## 2018-01-01 RX ORDER — BACITRACIN 500 UNIT/G
1 PACKET (EA) TOPICAL
Status: DISCONTINUED | OUTPATIENT
Start: 2018-01-01 | End: 2018-01-01 | Stop reason: HOSPADM

## 2018-01-01 RX ORDER — ACETAMINOPHEN 500 MG
1000 TABLET ORAL
Qty: 30 TAB | Refills: 0 | Status: SHIPPED | OUTPATIENT
Start: 2018-01-01 | End: 2019-01-01

## 2018-01-01 RX ORDER — MONTELUKAST SODIUM 10 MG/1
10 TABLET ORAL DAILY
Qty: 30 TAB | Refills: 1 | Status: SHIPPED | OUTPATIENT
Start: 2018-01-01 | End: 2019-01-01

## 2018-01-01 RX ORDER — FLUOCINONIDE 0.5 MG/G
OINTMENT TOPICAL 2 TIMES DAILY
COMMUNITY
End: 2019-01-01

## 2018-01-01 RX ORDER — ACETAMINOPHEN 325 MG/1
650 TABLET ORAL
Status: COMPLETED | OUTPATIENT
Start: 2018-01-01 | End: 2018-01-01

## 2018-01-01 RX ORDER — LIDOCAINE HYDROCHLORIDE AND EPINEPHRINE 10; 10 MG/ML; UG/ML
5 INJECTION, SOLUTION INFILTRATION; PERINEURAL ONCE
Status: COMPLETED | OUTPATIENT
Start: 2018-01-01 | End: 2018-01-01

## 2018-01-01 RX ORDER — ONDANSETRON 8 MG/1
8 TABLET, ORALLY DISINTEGRATING ORAL
Qty: 15 TAB | Refills: 0 | Status: SHIPPED | OUTPATIENT
Start: 2018-01-01 | End: 2019-01-01

## 2018-01-01 RX ORDER — BACITRACIN 500 [USP'U]/G
OINTMENT TOPICAL
Qty: 1 TUBE | Refills: 0 | Status: SHIPPED | OUTPATIENT
Start: 2018-01-01 | End: 2019-01-01

## 2018-01-01 RX ORDER — AZITHROMYCIN 250 MG/1
250 TABLET, FILM COATED ORAL SEE ADMIN INSTRUCTIONS
Qty: 6 TAB | Refills: 0 | Status: SHIPPED | OUTPATIENT
Start: 2018-01-01 | End: 2018-01-01

## 2018-01-01 RX ORDER — IPRATROPIUM BROMIDE AND ALBUTEROL SULFATE 2.5; .5 MG/3ML; MG/3ML
3 SOLUTION RESPIRATORY (INHALATION)
Status: COMPLETED | OUTPATIENT
Start: 2018-01-01 | End: 2018-01-01

## 2018-01-01 RX ADMIN — TETANUS TOXOID, REDUCED DIPHTHERIA TOXOID AND ACELLULAR PERTUSSIS VACCINE, ADSORBED 0.5 ML: 5; 2.5; 8; 8; 2.5 SUSPENSION INTRAMUSCULAR at 02:49

## 2018-01-01 RX ADMIN — BACITRACIN 1 PACKET: 500 OINTMENT TOPICAL at 02:48

## 2018-01-01 RX ADMIN — IPRATROPIUM BROMIDE AND ALBUTEROL SULFATE 3 ML: .5; 3 SOLUTION RESPIRATORY (INHALATION) at 05:08

## 2018-01-01 RX ADMIN — LIDOCAINE HYDROCHLORIDE AND EPINEPHRINE 50 MG: 10; 10 INJECTION, SOLUTION INFILTRATION; PERINEURAL at 02:48

## 2018-01-01 RX ADMIN — ACETAMINOPHEN 650 MG: 325 TABLET ORAL at 05:08

## 2018-03-09 ENCOUNTER — TELEPHONE (OUTPATIENT)
Dept: INTERNAL MEDICINE CLINIC | Age: 83
End: 2018-03-09

## 2018-03-09 DIAGNOSIS — R05.9 COUGH: Primary | ICD-10-CM

## 2018-03-09 NOTE — TELEPHONE ENCOUNTER
Fritz Lira called and would like to have an order for a chest x-ray sent over for the pt.   Fax is [phone]

## 2018-03-10 ENCOUNTER — APPOINTMENT (OUTPATIENT)
Dept: GENERAL RADIOLOGY | Age: 83
End: 2018-03-10
Attending: STUDENT IN AN ORGANIZED HEALTH CARE EDUCATION/TRAINING PROGRAM
Payer: MEDICARE

## 2018-03-10 ENCOUNTER — HOSPITAL ENCOUNTER (EMERGENCY)
Age: 83
Discharge: HOME | End: 2018-03-10
Attending: STUDENT IN AN ORGANIZED HEALTH CARE EDUCATION/TRAINING PROGRAM
Payer: MEDICARE

## 2018-03-10 VITALS
SYSTOLIC BLOOD PRESSURE: 154 MMHG | BODY MASS INDEX: 26.37 KG/M2 | HEART RATE: 78 BPM | RESPIRATION RATE: 18 BRPM | DIASTOLIC BLOOD PRESSURE: 58 MMHG | TEMPERATURE: 97.7 F | OXYGEN SATURATION: 96 % | HEIGHT: 67 IN | WEIGHT: 167.99 LBS

## 2018-03-10 DIAGNOSIS — R06.2 WHEEZING: Primary | ICD-10-CM

## 2018-03-10 LAB
ALBUMIN SERPL-MCNC: 3.2 G/DL (ref 3.5–5)
ALBUMIN/GLOB SERPL: 0.9 {RATIO} (ref 1.1–2.2)
ALP SERPL-CCNC: 78 U/L (ref 45–117)
ALT SERPL-CCNC: 23 U/L (ref 12–78)
ANION GAP SERPL CALC-SCNC: 6 MMOL/L (ref 5–15)
AST SERPL-CCNC: 25 U/L (ref 15–37)
BASOPHILS # BLD: 0.1 K/UL (ref 0–0.1)
BASOPHILS NFR BLD: 1 % (ref 0–1)
BILIRUB SERPL-MCNC: 0.3 MG/DL (ref 0.2–1)
BUN SERPL-MCNC: 21 MG/DL (ref 6–20)
BUN/CREAT SERPL: 21 (ref 12–20)
CALCIUM SERPL-MCNC: 9 MG/DL (ref 8.5–10.1)
CHLORIDE SERPL-SCNC: 104 MMOL/L (ref 97–108)
CO2 SERPL-SCNC: 32 MMOL/L (ref 21–32)
CREAT SERPL-MCNC: 1.01 MG/DL (ref 0.55–1.02)
DIFFERENTIAL METHOD BLD: ABNORMAL
EOSINOPHIL # BLD: 0.4 K/UL (ref 0–0.4)
EOSINOPHIL NFR BLD: 5 % (ref 0–7)
ERYTHROCYTE [DISTWIDTH] IN BLOOD BY AUTOMATED COUNT: 14.7 % (ref 11.5–14.5)
GLOBULIN SER CALC-MCNC: 3.4 G/DL (ref 2–4)
GLUCOSE SERPL-MCNC: 104 MG/DL (ref 65–100)
HCT VFR BLD AUTO: 38.3 % (ref 35–47)
HGB BLD-MCNC: 11.8 G/DL (ref 11.5–16)
IMM GRANULOCYTES # BLD: 0.1 K/UL (ref 0–0.04)
IMM GRANULOCYTES NFR BLD AUTO: 1 % (ref 0–0.5)
LYMPHOCYTES # BLD: 1.5 K/UL (ref 0.8–3.5)
LYMPHOCYTES NFR BLD: 20 % (ref 12–49)
MCH RBC QN AUTO: 28.8 PG (ref 26–34)
MCHC RBC AUTO-ENTMCNC: 30.8 G/DL (ref 30–36.5)
MCV RBC AUTO: 93.4 FL (ref 80–99)
MONOCYTES # BLD: 0.8 K/UL (ref 0–1)
MONOCYTES NFR BLD: 11 % (ref 5–13)
NEUTS SEG # BLD: 4.7 K/UL (ref 1.8–8)
NEUTS SEG NFR BLD: 63 % (ref 32–75)
NRBC # BLD: 0 K/UL (ref 0–0.01)
NRBC BLD-RTO: 0 PER 100 WBC
PLATELET # BLD AUTO: 270 K/UL (ref 150–400)
PMV BLD AUTO: 10.2 FL (ref 8.9–12.9)
POTASSIUM SERPL-SCNC: 5 MMOL/L (ref 3.5–5.1)
PROT SERPL-MCNC: 6.6 G/DL (ref 6.4–8.2)
RBC # BLD AUTO: 4.1 M/UL (ref 3.8–5.2)
SODIUM SERPL-SCNC: 142 MMOL/L (ref 136–145)
WBC # BLD AUTO: 7.5 K/UL (ref 3.6–11)

## 2018-03-10 PROCEDURE — 99284 EMERGENCY DEPT VISIT MOD MDM: CPT

## 2018-03-10 PROCEDURE — 74011636637 HC RX REV CODE- 636/637: Performed by: STUDENT IN AN ORGANIZED HEALTH CARE EDUCATION/TRAINING PROGRAM

## 2018-03-10 PROCEDURE — 77030013140 HC MSK NEB VYRM -A

## 2018-03-10 PROCEDURE — 74011000250 HC RX REV CODE- 250: Performed by: STUDENT IN AN ORGANIZED HEALTH CARE EDUCATION/TRAINING PROGRAM

## 2018-03-10 PROCEDURE — 93005 ELECTROCARDIOGRAM TRACING: CPT

## 2018-03-10 PROCEDURE — 36415 COLL VENOUS BLD VENIPUNCTURE: CPT | Performed by: STUDENT IN AN ORGANIZED HEALTH CARE EDUCATION/TRAINING PROGRAM

## 2018-03-10 PROCEDURE — 94640 AIRWAY INHALATION TREATMENT: CPT

## 2018-03-10 PROCEDURE — 80053 COMPREHEN METABOLIC PANEL: CPT | Performed by: STUDENT IN AN ORGANIZED HEALTH CARE EDUCATION/TRAINING PROGRAM

## 2018-03-10 PROCEDURE — 71046 X-RAY EXAM CHEST 2 VIEWS: CPT

## 2018-03-10 PROCEDURE — 85025 COMPLETE CBC W/AUTO DIFF WBC: CPT | Performed by: STUDENT IN AN ORGANIZED HEALTH CARE EDUCATION/TRAINING PROGRAM

## 2018-03-10 PROCEDURE — A9270 NON-COVERED ITEM OR SERVICE: HCPCS | Performed by: STUDENT IN AN ORGANIZED HEALTH CARE EDUCATION/TRAINING PROGRAM

## 2018-03-10 RX ORDER — METHYLPREDNISOLONE 4 MG/1
TABLET ORAL
Qty: 1 DOSE PACK | Refills: 0 | Status: SHIPPED | OUTPATIENT
Start: 2018-03-10 | End: 2018-01-01 | Stop reason: ALTCHOICE

## 2018-03-10 RX ORDER — IPRATROPIUM BROMIDE AND ALBUTEROL SULFATE 2.5; .5 MG/3ML; MG/3ML
3 SOLUTION RESPIRATORY (INHALATION)
Status: COMPLETED | OUTPATIENT
Start: 2018-03-10 | End: 2018-03-10

## 2018-03-10 RX ORDER — PREDNISONE 20 MG/1
60 TABLET ORAL
Status: COMPLETED | OUTPATIENT
Start: 2018-03-10 | End: 2018-03-10

## 2018-03-10 RX ADMIN — IPRATROPIUM BROMIDE AND ALBUTEROL SULFATE 3 ML: .5; 3 SOLUTION RESPIRATORY (INHALATION) at 16:14

## 2018-03-10 RX ADMIN — IPRATROPIUM BROMIDE AND ALBUTEROL SULFATE 3 ML: .5; 3 SOLUTION RESPIRATORY (INHALATION) at 17:56

## 2018-03-10 RX ADMIN — PREDNISONE 60 MG: 20 TABLET ORAL at 16:14

## 2018-03-10 NOTE — ED PROVIDER NOTES
HPI Comments: 80 y.o. female with past medical history significant for colon cancer, HTN, dementia, high cholesterol, umbilical hernia, arthritis, osteoporosis, and depression who presents to the ED via EMS with chief complaint of wheezing. Pt's daughter reports pt has had wheezing and SOB so she had a chest x-ray yesterday at her facility that showed pneumonia. Pt's daughter states the on call physician was not comfortable prescribing her medication to treat her pneumonia without seeing her due to her age so she was referred to the ED for further evaluation and treatment. Pt's daughter states pt uses albuterol nebulizer treatments at home TID. Pt's daughter denies pt being recently admitted. There are no other acute medical complaints voiced at this time. Social Hx: Lives in assisted living. Former smoker. PCP: uQynh Arnold MD    Note written by Anahi Whitney, as dictated by Eduar Jesus MD 3:54 PM     The history is provided by the patient and a relative. Past Medical History:   Diagnosis Date    Arthritis     Dr. Ace Allison [facility])     colon CA in situ    Dementia     Depression     Hematoma (nontraumatic) of breast     Dr. Alina Alexis [facility] breast surgeon    High cholesterol     Hypertension     Osteoporosis     Dr. Warren Cortes fall fractured vertebrae    Psychiatric disorder     depression    Umbilical hernia        Past Surgical History:   Procedure Laterality Date    HX COLECTOMY  1985    HX HEENT      cateracts bilateral    HX HIP REPLACEMENT  2009    Right    HX ORTHOPAEDIC  2012    T11 & L1 vertebrae fx    HX PELVIC FRACTURE TX  2010         Family History:   Problem Relation Age of Onset    Heart Disease Mother        Social History     Social History    Marital status:      Spouse name: N/A    Number of children: N/A    Years of education: N/A     Occupational History    Not on file.      Social History Main Topics    Smoking status: Former Smoker     Packs/day: 0.50     Quit date: 1/1/1970    Smokeless tobacco: Never Used    Alcohol use Yes      Comment: occasional    Drug use: No    Sexual activity: No     Other Topics Concern    Not on file     Social History Narrative    [facility]    Can cook if she wants        3 children    2 daughters and 1 son        No smoke        Drink- wine, or bourbon or vodka 1 ounce         ALLERGIES: Latex; Codeine; Neosporin [benzalkonium chloride]; and Sulfa dyne    Review of Systems   Constitutional: Negative for activity change, diaphoresis, fatigue and fever. HENT: Negative for congestion and sore throat. Eyes: Negative for photophobia and visual disturbance. Respiratory: Positive for shortness of breath and wheezing. Negative for chest tightness. Cardiovascular: Negative for chest pain, palpitations and leg swelling. Gastrointestinal: Negative for abdominal pain, blood in stool, constipation, diarrhea, nausea and vomiting. Genitourinary: Negative for difficulty urinating, dysuria, flank pain, frequency and hematuria. Musculoskeletal: Negative for back pain. Neurological: Negative for dizziness, syncope, numbness and headaches. All other systems reviewed and are negative. Vitals:    03/10/18 1503 03/10/18 1528   BP: 154/58    Pulse: 78    Resp: 18    Temp: 97.7 °F (36.5 °C)    SpO2: 96% 96%   Weight: 76.2 kg (167 lb 15.9 oz)    Height: 5' 7\" (1.702 m)             Physical Exam   Constitutional: She is oriented to person, place, and time. She appears well-developed and well-nourished. No distress. HENT:   Head: Normocephalic and atraumatic. Nose: Nose normal.   Mouth/Throat: Oropharynx is clear and moist. No oropharyngeal exudate. Eyes: Conjunctivae and EOM are normal. Right eye exhibits no discharge. Left eye exhibits no discharge. No scleral icterus. Neck: Normal range of motion. Neck supple. No JVD present. No tracheal deviation present.  No thyromegaly present. Cardiovascular: Normal rate, regular rhythm, normal heart sounds and intact distal pulses. Exam reveals no gallop and no friction rub. No murmur heard. Pulmonary/Chest: Effort normal. No stridor. No respiratory distress. She has wheezes (diffuse expiratory wheezes throughout). She has no rales. She exhibits no tenderness. No apparent respiratory distress. Abdominal: Bowel sounds are normal. She exhibits no distension and no mass. There is no tenderness. There is no rebound. Musculoskeletal: Normal range of motion. She exhibits no edema or tenderness. Lymphadenopathy:     She has no cervical adenopathy. Neurological: She is alert and oriented to person, place, and time. No cranial nerve deficit. Coordination normal.   Skin: Skin is warm and dry. No rash noted. She is not diaphoretic. No erythema. No pallor. Psychiatric: She has a normal mood and affect. Her behavior is normal. Judgment and thought content normal.   Nursing note and vitals reviewed. Note written by Anahi Cano, as dictated by Khari Smith MD 3:55 PM    MDM  Number of Diagnoses or Management Options  Wheezing:      Amount and/or Complexity of Data Reviewed  Clinical lab tests: ordered and reviewed  Tests in the radiology section of CPT®: ordered and reviewed  Review and summarize past medical records: yes  Independent visualization of images, tracings, or specimens: yes    Risk of Complications, Morbidity, and/or Mortality  Presenting problems: moderate  Diagnostic procedures: moderate  Management options: moderate    Patient Progress  Patient progress: resolved        ED Course       Procedures    2:45 PM  The patient has been reevaluated. The patient is ready for discharge. The patient's signs, symptoms, diagnosis, and discharge instructions have been discussed and the patient/ family has conveyed their understanding.  The patient is to follow up as recommended or return to the ED should their symptoms worsen. Plan has been discussed and the patient is in agreement. LABORATORY TESTS:  No results found for this or any previous visit (from the past 12 hour(s)). IMAGING RESULTS:  XR CHEST PA LAT   Final Result        No results found. MEDICATIONS GIVEN:  Medications   albuterol-ipratropium (DUO-NEB) 2.5 MG-0.5 MG/3 ML (3 mL Nebulization Given 3/10/18 1614)   predniSONE (DELTASONE) tablet 60 mg (60 mg Oral Given 3/10/18 1614)   albuterol-ipratropium (DUO-NEB) 2.5 MG-0.5 MG/3 ML (3 mL Nebulization Given 3/10/18 1756)       IMPRESSION:  1. Wheezing        PLAN:  1. Discharge Medication List as of 3/10/2018  5:45 PM      START taking these medications    Details   methylPREDNISolone (MEDROL, ALEX,) 4 mg tablet Take per package insert, Print, Disp-1 Dose Pack, R-0         CONTINUE these medications which have NOT CHANGED    Details   denosumab (PROLIA) 60 mg/mL injection 60 mg by SubCUTAneous route., Historical Med      guaiFENesin ER (MUCINEX) 600 mg ER tablet Take 1 Tab by mouth two (2) times a day. Follow with 6 oz of water, Normal, Disp-60 Tab, R-1      predniSONE (DELTASONE) 10 mg tablet Take 4 tab for 3 days then 3 tabs for 3 days then 2 tabs for 2 days then 1 tab, Normal, Disp-30 Tab, R-0      diphenhydrAMINE (BENADRYL ALLERGY) 12.5 mg/5 mL syrup Take 10 mL by mouth nightly as needed. Indications: Urticaria, Normal, Disp-236 mL, R-0      triamcinolone acetonide (KENALOG) 0.1 % topical cream Apply  to affected area two (2) times a day. use thin layer, Normal, Disp-45 g, R-0      albuterol (PROVENTIL VENTOLIN) 2.5 mg /3 mL (0.083 %) nebulizer solution 3 mL by Nebulization route two (2) times a day., Print, Disp-24 Each, R-3      fluticasone (FLONASE) 50 mcg/actuation nasal spray 2 Sprays by Both Nostrils route daily. , Print, Disp-1 Bottle, R-3      traMADol (ULTRAM) 50 mg tablet Take 1 Tab by mouth every six (6) hours as needed for Pain.  Max Daily Amount: 200 mg., Print, Disp-30 Tab, R-0 therapeutic multivitamin (THERAGRAN) tablet Take 1 Tab by mouth daily. , Historical Med      acetaminophen (TYLENOL) 500 mg tablet Take 1,000 mg by mouth every four (4) hours as needed for Pain., Historical Med      bismuth subsalicylate (BISMATROL) 262 mg/15 mL suspension Take 30 mL by mouth as needed (After each loose stool). , Historical Med      albuterol (PROVENTIL, VENTOLIN) 2 mg/5 mL syrup Take 5 mL by mouth four (4) times daily as needed. Please give pt if she is coughing. If not improving need rx and nebulizer. , Print, Disp-473 mL, R-2      escitalopram oxalate (LEXAPRO) 10 mg tablet Take 1 Tab by mouth daily. , Normal, Disp-90 Tab, R-1      Cholecalciferol, Vitamin D3, 1,000 unit cap Take 1 capsule by mouth daily, Normal, Disp-90 Cap, R-1           2.    Follow-up Information     Follow up With Details Comments [address], [place]  If symptoms worsen [address]  Internal Medicine [facility] [address]  [phone]      [facility] EMERGENCY DEPT  If symptoms worsen [address] [facility]  [phone]            Return to ED for new or worsening symptoms       Carmencita Sam MD

## 2018-03-10 NOTE — ED TRIAGE NOTES
Per EMS, patient came from assisted living diagnosed with pneumonia yesterday. Has chest xray.  No improvement with breathing treatments at facility

## 2018-03-10 NOTE — TELEPHONE ENCOUNTER
Nurse calling to report chest X-ray done yesterday showed lobar pneumonia. Pt with productive \"rattling\" cough but now up and showering. I advised evaluation in ER for labs, management. They will transport her to [facility] now.

## 2018-03-10 NOTE — DISCHARGE INSTRUCTIONS
Wheezing or Bronchoconstriction: Care Instructions  Your Care Instructions  Wheezing is a whistling noise made during breathing. It occurs when the small airways, or bronchial tubes, that lead to your lungs swell or contract (spasm) and become narrow. This narrowing is called bronchoconstriction. When your airways constrict, it is hard for air to pass through and this makes it hard for you to breathe. Wheezing and bronchoconstriction can be caused by many problems, including:  · An infection such as the flu or a cold. · Allergies such as hay fever. · Diseases such as asthma or chronic obstructive pulmonary disease. · Smoking. Treatment for your wheezing depends on what is causing the problem. Your wheezing may get better without treatment. But you may need to pay attention to things that cause your wheezing and avoid them. Or you may need medicine to help treat the wheezing and to reduce the swelling or to relieve spasms in your lungs. Follow-up care is a key part of your treatment and safety. Be sure to make and go to all appointments, and call your doctor if you are having problems. It is also a good idea to know your test results and keep a list of the medicines you take. How can you care for yourself at home? · Take your medicine exactly as prescribed. Call your doctor if you think you are having a problem with your medicine. You will get more details on the specific medicine your doctor prescribes. · If your doctor prescribed antibiotics, take them as directed. Do not stop taking them just because you feel better. You need to take the full course of antibiotics. · Breathe moist air from a humidifier, hot shower, or sink filled with hot water. This may help ease your symptoms and make it easier for you to breathe. · If you have congestion in your nose and throat, drinking plenty of fluids, especially hot fluids, may help relieve your symptoms.  If you have kidney, heart, or liver disease and have to limit fluids, talk with your doctor before you increase the amount of fluids you drink. · If you have mucus in your airways, it may help to breathe deeply and cough. · Do not smoke or allow others to smoke around you. Smoking can make your wheezing worse. If you need help quitting, talk to your doctor about stop-smoking programs and medicines. These can increase your chances of quitting for good. · Avoid things that may cause your wheezing. These may include colds, smoke, air pollution, dust, pollen, pets, cockroaches, stress, and cold air. When should you call for help? Call 911 anytime you think you may need emergency care. For example, call if:  ? · You have severe trouble breathing. ? · You passed out (lost consciousness). ?Call your doctor now or seek immediate medical care if:  ? · You cough up yellow, dark brown, or bloody mucus (sputum). ? · You have new or worse shortness of breath. ? · Your wheezing is not getting better or it gets worse after you start taking your medicine. ? Watch closely for changes in your health, and be sure to contact your doctor if:  ? · You do not get better as expected. Where can you learn more? Go to http://suzanne-abdi.info/. Enter 454 [id number] in the search box to learn more about \"Wheezing or Bronchoconstriction: Care Instructions. \"  Current as of: May 12, 2017  Content Version: 11.4  © 1246-6417 [employer]. Care instructions adapted under license by [employer] (which disclaims liability or warranty for this information). If you have questions about a medical condition or this instruction, always ask your healthcare professional. [address] any warranty or liability for your use of this information.

## 2018-03-12 LAB
ATRIAL RATE: 72 BPM
CALCULATED P AXIS, ECG09: 83 DEGREES
CALCULATED R AXIS, ECG10: -46 DEGREES
CALCULATED T AXIS, ECG11: 43 DEGREES
DIAGNOSIS, 93000: NORMAL
P-R INTERVAL, ECG05: 178 MS
Q-T INTERVAL, ECG07: 398 MS
QRS DURATION, ECG06: 76 MS
QTC CALCULATION (BEZET), ECG08: 435 MS
VENTRICULAR RATE, ECG03: 72 BPM

## 2018-03-13 ENCOUNTER — PATIENT OUTREACH (OUTPATIENT)
Dept: INTERNAL MEDICINE CLINIC | Age: 83
End: 2018-03-13

## 2018-03-13 NOTE — PROGRESS NOTES
Patient on discharge report dated 3/10/18 from [facility]. Diagnosis: wheezing. Left message on voicemail to make f/u appt if needed. Will attempt to contact again. Need to complete post-discharge assessment.

## 2018-05-13 ENCOUNTER — APPOINTMENT (OUTPATIENT)
Dept: GENERAL RADIOLOGY | Age: 83
End: 2018-05-13
Attending: EMERGENCY MEDICINE
Payer: MEDICARE

## 2018-05-13 ENCOUNTER — HOSPITAL ENCOUNTER (EMERGENCY)
Age: 83
Discharge: NURSING, REHAB OR LONG-TERM CARE | End: 2018-05-13
Attending: EMERGENCY MEDICINE
Payer: MEDICARE

## 2018-05-13 VITALS
SYSTOLIC BLOOD PRESSURE: 163 MMHG | TEMPERATURE: 98 F | WEIGHT: 165 LBS | HEART RATE: 68 BPM | RESPIRATION RATE: 18 BRPM | DIASTOLIC BLOOD PRESSURE: 63 MMHG | OXYGEN SATURATION: 99 % | HEIGHT: 64 IN | BODY MASS INDEX: 28.17 KG/M2

## 2018-05-13 DIAGNOSIS — S41.111A SKIN TEAR OF RIGHT UPPER ARM WITHOUT COMPLICATION, INITIAL ENCOUNTER: ICD-10-CM

## 2018-05-13 DIAGNOSIS — M25.521 RIGHT ELBOW PAIN: ICD-10-CM

## 2018-05-13 DIAGNOSIS — S81.811A SKIN TEAR OF RIGHT LOWER LEG WITHOUT COMPLICATION, INITIAL ENCOUNTER: ICD-10-CM
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
## 2018-05-13 PROCEDURE — 74011250637 HC RX REV CODE- 250/637: Performed by: EMERGENCY MEDICINE

## 2018-05-13 PROCEDURE — 99285 EMERGENCY DEPT VISIT HI MDM: CPT

## 2018-05-13 PROCEDURE — 73080 X-RAY EXAM OF ELBOW: CPT

## 2018-05-13 RX ADMIN — NEOMYCIN AND POLYMYXIN B SULFATES AND BACITRACIN ZINC 1 PACKET: 400; 3.5; 5 OINTMENT TOPICAL at 01:23

## 2018-05-13 NOTE — ED PROVIDER NOTES
HPI Comments: 80 y.o. female with past medical history significant for colon CA, arthritis, XOL, HTN, depression, dementia, R hip replacement, colectomy, and pelvic fx who presents from home with chief complaint of fall. The pt reports that she got up and fell. The pt believes that she did not trip on something. The pt c/o R elbow pain. There are no other acute medical concerns at this time. Social hx: former smoker, EtOH use  PCP: Joellen Angeles MD    Full history, physical exam, and ROS unable to be obtained due to:  dementia. Note written by Anahi Lamb, as dictated by Jose Dinh,  1:23 AM      The history is provided by the patient. No  was used. Past Medical History:   Diagnosis Date    Arthritis     [facility])     colon CA in situ    Dementia     Depression     Hematoma (nontraumatic) of breast     Dr. Belynda Mcburney freeman breast surgeon    High cholesterol     Hypertension     Osteoporosis     Dr. Bessie Langford fall fractured vertebrae    Psychiatric disorder     depression    Umbilical hernia        Past Surgical History:   Procedure Laterality Date    HX COLECTOMY  1985    HX HEENT      cateracts bilateral    HX HIP REPLACEMENT  2009    Right    HX ORTHOPAEDIC  2012    T11 & L1 vertebrae fx    HX PELVIC FRACTURE [place]  2010         Family History:   Problem Relation Age of Onset    Heart Disease Mother        Social History     Social History    Marital status:      Spouse name: N/A    Number of children: N/A    Years of education: N/A     Occupational History    Not on file.      Social History Main Topics    Smoking status: Former Smoker     Packs/day: 0.50     Quit date: 1/1/1970    Smokeless tobacco: Never Used    Alcohol use Yes      Comment: occasional    Drug use: No    Sexual activity: No     Other Topics Concern    Not on file     Social History Narrative    [employer] Can cook if she wants        3 children    2 daughters and 1 son        No smoke        Drink- wine, or bourbon or vodka 1 ounce     ALLERGIES: Latex; Codeine; Neosporin [benzalkonium chloride]; and Sulfa dyne    Review of Systems   Unable to perform ROS: Dementia   Skin: Positive for wound. Vitals:    05/13/18 0121 05/13/18 0232   BP: 163/63    Pulse: 68    Resp: 18    Temp: 98 °F (36.7 °C)    SpO2: 99%    Weight:  74.8 kg (165 lb)   Height:  5' 4\" (1.626 m)            Physical Exam   Constitutional: She appears well-developed and well-nourished. No distress. HENT:   Head: Normocephalic and atraumatic. Right Ear: External ear normal.   Left Ear: External ear normal.   Nose: Nose normal.   Mouth/Throat: Oropharynx is clear and moist. No oropharyngeal exudate. Eyes: Conjunctivae and EOM are normal. Pupils are equal, round, and reactive to light. Right eye exhibits no discharge. Left eye exhibits no discharge. No scleral icterus. Neck: Normal range of motion. Neck supple. No JVD present. No tracheal deviation present. Cardiovascular: Normal rate, regular rhythm, normal heart sounds and intact distal pulses. Exam reveals no gallop and no friction rub. No murmur heard. Pulmonary/Chest: Effort normal. No stridor. No respiratory distress. She has no decreased breath sounds. She has wheezes (faint expiratory wheezes to the upper lobes). She has no rhonchi. She has no rales. She exhibits no tenderness. Abdominal: Soft. Bowel sounds are normal. She exhibits no distension. There is no tenderness. There is no rebound and no guarding. Musculoskeletal: Normal range of motion. She exhibits no edema or tenderness. Neurological: She is alert. She has normal strength and normal reflexes. No cranial nerve deficit or sensory deficit. She exhibits normal muscle tone. Coordination normal. GCS eye subscore is 4. GCS verbal subscore is 5. GCS motor subscore is 6. Skin: Skin is warm and dry. No rash noted. She is not diaphoretic. No erythema. No pallor. skin tear to the R proximal forearm that appears to be healing;  Small 1 cm skin tear to the R anterior tibia;     Nursing note and vitals reviewed. Note written by Anahi Agrawal, as dictated by Adriana Shahid DO 1:24 AM      MDM  Number of Diagnoses or Management Options  Fall, initial encounter:   Right elbow pain:   Skin tear of right lower leg without complication, initial encounter:   Skin tear of right upper arm without complication, initial encounter:      Amount and/or Complexity of Data Reviewed  Tests in the radiology section of CPT®: ordered and reviewed    Risk of Complications, Morbidity, and/or Mortality  Presenting problems: moderate  Diagnostic procedures: low  Management options: low    Patient Progress  Patient progress: stable        ED Course       Procedures    Chief Complaint   Patient presents with    Fall       The patient's presenting problems have been discussed, and they are in agreement with the care plan formulated and outlined with them. I have encouraged them to ask questions as they arise throughout their visit. MEDICATIONS GIVEN:  Medications   neomycin-bacitracnZn-polymyxnB (NEOSPORIN) ointment 1 Packet (1 Packet Topical Given 5/13/18 0123)       LABS REVIEWED:  No results found for this or any previous visit (from the past 24 hour(s)). VITAL SIGNS:  Patient Vitals for the past 12 hrs:   Temp Pulse Resp BP SpO2   05/13/18 0121 98 °F (36.7 °C) 68 18 163/63 99 %       RADIOLOGY RESULTS:  The following have been ordered and reviewed:  Xr Elbow Rt Min 3 V    Result Date: 5/13/2018  EXAM:  XR ELBOW RT MIN 3 V INDICATION:   fall. COMPARISON: None. FINDINGS: Three views of the right elbow demonstrate no fracture, dislocation, effusion or other acute abnormality. IMPRESSION: No acute abnormality. PROGRESS NOTES:  Discussed results and plan with patient.  Patient will be discharged back to facility with PCP followup. Patient instructed to return to the emergency room for any worsening symptoms or any other concerns. DIAGNOSIS:    1. Fall, initial encounter    2. Right elbow pain    3. Skin tear of right upper arm without complication, initial encounter    4. Skin tear of right lower leg without complication, initial encounter        PLAN:  Follow-up Information     Follow up With Details Comments [address], [place] In 1 week  [address]  [address] Count includes the [facility] [address]  [phone]      [facility] EMERGENCY DEPT  If symptoms worsen [address] [facility]  [phone]        Current Discharge Medication List      CONTINUE these medications which have NOT CHANGED    Details   methylPREDNISolone (MEDROL, ALEX,) 4 mg tablet Take per package insert  Qty: 1 Dose Pack, Refills: 0      denosumab (PROLIA) 60 mg/mL injection 60 mg by SubCUTAneous route. guaiFENesin ER (MUCINEX) 600 mg ER tablet Take 1 Tab by mouth two (2) times a day. Follow with 6 oz of water  Qty: 60 Tab, Refills: 1    Associated Diagnoses: Cough      predniSONE (DELTASONE) 10 mg tablet Take 4 tab for 3 days then 3 tabs for 3 days then 2 tabs for 2 days then 1 tab  Qty: 30 Tab, Refills: 0    Associated Diagnoses: Cough      diphenhydrAMINE (BENADRYL ALLERGY) 12.5 mg/5 mL syrup Take 10 mL by mouth nightly as needed. Indications: Urticaria  Qty: 236 mL, Refills: 0      triamcinolone acetonide (KENALOG) 0.1 % topical cream Apply  to affected area two (2) times a day. use thin layer  Qty: 45 g, Refills: 0      albuterol (PROVENTIL VENTOLIN) 2.5 mg /3 mL (0.083 %) nebulizer solution 3 mL by Nebulization route two (2) times a day. Qty: 24 Each, Refills: 3    Associated Diagnoses: Cough      fluticasone (FLONASE) 50 mcg/actuation nasal spray 2 Sprays by Both Nostrils route daily.   Qty: 1 Bottle, Refills: 3    Associated Diagnoses: Acute non-recurrent maxillary sinusitis traMADol (ULTRAM) 50 mg tablet Take 1 Tab by mouth every six (6) hours as needed for Pain. Max Daily Amount: 200 mg. Qty: 30 Tab, Refills: 0      therapeutic multivitamin (THERAGRAN) tablet Take 1 Tab by mouth daily. acetaminophen (TYLENOL) 500 mg tablet Take 1,000 mg by mouth every four (4) hours as needed for Pain. bismuth subsalicylate (BISMATROL) 262 mg/15 mL suspension Take 30 mL by mouth as needed (After each loose stool). albuterol (PROVENTIL, VENTOLIN) 2 mg/5 mL syrup Take 5 mL by mouth four (4) times daily as needed. Please give pt if she is coughing. If not improving need rx and nebulizer. Qty: 473 mL, Refills: 2    Associated Diagnoses: Cough      escitalopram oxalate (LEXAPRO) 10 mg tablet Take 1 Tab by mouth daily. Qty: 90 Tab, Refills: 1    Associated Diagnoses: Other depression      Cholecalciferol, Vitamin D3, 1,000 unit cap Take 1 capsule by mouth daily  Qty: 90 Cap, Refills: 1             ED COURSE: The patient's hospital course has been uncomplicated.

## 2018-05-13 NOTE — DISCHARGE INSTRUCTIONS
We hope that we have addressed all of your medical concerns. The examination and treatment you received in the Emergency Department were for an emergent problem and were not intended as complete care. It is important that you follow up with your healthcare provider(s) for ongoing care. If your symptoms worsen or do not improve as expected, and you are unable to reach your usual health care provider(s), you should return to the Emergency Department. Today's healthcare is undergoing tremendous change, and patient satisfaction surveys are one of the many tools to assess the quality of medical care. You may receive a survey from the [employer] regarding your experience in the Emergency Department. I hope that your experience has been completely positive, particularly the medical care that I provided. As such, please participate in the survey; anything less than excellent does not meet my expectations or intentions. [facility] and [address] [place] [address] participate in nationally recognized quality of care measures. If your blood pressure is greater than 120/80, as reported below, we urge that you seek medical care to address the potential of high blood pressure, commonly known as hypertension. Hypertension can be hereditary or can be caused by certain medical conditions, pain, stress, or \"white coat syndrome. \"       Please make an appointment with your health care provider(s) for follow up of your Emergency Department visit. VITALS:   Patient Vitals for the past 8 hrs:   Temp Pulse Resp BP SpO2   05/13/18 0121 98 °F (36.7 °C) 68 18 163/63 99 %          Thank you for allowing us to provide you with medical care today. We realize that you have many choices for your emergency care needs. Please choose us in the future for any continued health care needs. Melonie Tiwari, [employer]. Office: [phone]            No results found for this or any previous visit (from the past 24 hour(s)). Xr Elbow Rt Min 3 V    Result Date: 5/13/2018  EXAM:  XR ELBOW RT MIN 3 V INDICATION:   fall. COMPARISON: None. FINDINGS: Three views of the right elbow demonstrate no fracture, dislocation, effusion or other acute abnormality. IMPRESSION: No acute abnormality. Preventing Falls: Care Instructions  Your Care Instructions    Getting around your home safely can be a challenge if you have injuries or health problems that make it easy for you to fall. Loose rugs and furniture in walkways are among the dangers for many older people who have problems walking or who have poor eyesight. People who have conditions such as arthritis, osteoporosis, or dementia also have to be careful not to fall. You can make your home safer with a few simple measures. Follow-up care is a key part of your treatment and safety. Be sure to make and go to all appointments, and call your doctor if you are having problems. It's also a good idea to know your test results and keep a list of the medicines you take. How can you care for yourself at home? Taking care of yourself  · You may get dizzy if you do not drink enough water. To prevent dehydration, drink plenty of fluids, enough so that your urine is light yellow or clear like water. Choose water and other caffeine-free clear liquids. If you have kidney, heart, or liver disease and have to limit fluids, talk with your doctor before you increase the amount of fluids you drink. · Exercise regularly to improve your strength, muscle tone, and balance. Walk if you can. Swimming may be a good choice if you cannot walk easily. · Have your vision and hearing checked each year or any time you notice a change. If you have trouble seeing and hearing, you might not be able to avoid objects and could lose your balance. · Know the side effects of the medicines you take.  Ask your doctor or pharmacist whether the medicines you take can affect your balance. Sleeping pills or sedatives can affect your balance. · Limit the amount of alcohol you drink. Alcohol can impair your balance and other senses. · Ask your doctor whether calluses or corns on your feet need to be removed. If you wear loose-fitting shoes because of calluses or corns, you can lose your balance and fall. · Talk to your doctor if you have numbness in your feet. Preventing falls at home  · Remove raised doorway thresholds, throw rugs, and clutter. Repair loose carpet or raised areas in the floor. · Move furniture and electrical cords to keep them out of walking paths. · Use nonskid floor wax, and wipe up spills right away, especially on ceramic tile floors. · If you use a walker or cane, put rubber tips on it. If you use crutches, clean the bottoms of them regularly with an abrasive pad, such as steel wool. · Keep your house well lit, especially [facility], and outside walkways. Use night-lights in areas such as hallways and bathrooms. Add extra light switches or use remote switches (such as switches that go on or off when you clap your hands) to make it easier to turn lights on if you have to get up during the night. · Install sturdy handrails on stairways. · Move items in your cabinets so that the things you use a lot are on the lower shelves (about waist level). · Keep a cordless phone and a flashlight with new batteries by your bed. If possible, put a phone in each of the main rooms of your house, or carry a cell phone in case you fall and cannot reach a phone. Or, you can wear a device around your neck or wrist. You push a button that sends a signal for help. · Wear low-heeled shoes that fit well and give your feet good support. Use footwear with nonskid soles. Check the heels and soles of your shoes for wear. Repair or replace worn heels or soles.   · Do not wear socks without shoes on wood floors. · Walk on the grass when the sidewalks are slippery. If you live in an area that gets snow and ice in the winter, sprinkle salt on slippery steps and sidewalks. Preventing falls in the bath  · Install grab bars and nonskid mats inside and outside your shower or tub and near the toilet and sinks. · Use shower chairs and bath benches. · Use a hand-held shower head that will allow you to sit while showering. · Get into a tub or shower by putting the weaker leg in first. Get out of a tub or shower with your strong side first.  · Repair loose toilet seats and consider installing a raised toilet seat to make getting on and off the toilet easier. · Keep your bathroom door unlocked while you are in the shower. Where can you learn more? Go to http://suzanneKobojoabdi.info/. Enter 0476 [id number] 69 [id number] in the search box to learn more about \"Preventing Falls: Care Instructions. \"  Current as of: May 12, 2017  Content Version: 11.4  © 7877-4388 [employer]. Care instructions adapted under license by [employer] (which disclaims liability or warranty for this information). If you have questions about a medical condition or this instruction, always ask your healthcare professional. [place] [address] any warranty or liability for your use of this information. Skin Tears: Care Instructions  Your Care Instructions  As we get older, our skin gets drier and more fragile. Sometimes this can cause the outer layers of skin to split and tear open. Skin tears are treated in different ways. In some cases, doctors use pieces of tape called Steri-Strips to pull the skin together and help it heal. Other times, it's best to leave the tear open and cover it with a special wound-care bandage. Skin tears are usually not serious. They usually heal in a few weeks. But how long you take to heal depends on your body and the type of tear you have.  Sometimes the torn piece of skin is used to protect the wound while it heals. But that piece of skin does not heal. It may fall off on its own. Or the doctor may remove it. As your tear heals, it's important to keep it clean to help prevent infection. The doctor has checked you carefully, but problems can develop later. If you notice any problems or new symptoms, get medical treatment right away. Follow-up care is a key part of your treatment and safety. Be sure to make and go to all appointments, and call your doctor if you are having problems. It's also a good idea to know your test results and keep a list of the medicines you take. How can you care for yourself at home? · If you have pain, ask your doctor if you can take an over-the-counter pain medicine, such as acetaminophen (Tylenol), ibuprofen (Advil, Motrin), or naproxen (Aleve). Be safe with medicines. Read and follow all instructions on the label. · If you have a bandage, follow your doctor's instructions for changing it. · If you have Steri-Strips, leave them on until they fall off. · Follow your doctor's instructions about bathing. · Gently wash the skin tear with plain water 2 times a day. Do not rub the area. · Let the area air dry. Or you can pat it carefully with a soft towel. When should you call for help? Call your doctor now or seek immediate medical care if:  ? · You have signs of infection, such as:  ¨ Increased pain, swelling, warmth, or redness around the tear. ¨ Red streaks leading from the tear. ¨ Pus draining from the tear. ¨ A fever. ? · The tear starts to bleed a lot. Small amounts of blood are normal.   ? Watch closely for changes in your health, and be sure to contact your doctor if:  ? · You do not get better as expected. Where can you learn more? Go to http://suzanne-abdi.info/. Enter N653 in the search box to learn more about \"Skin Tears: Care Instructions. \"  Current as of: March 20, 2017  Content Version: 11.4  © 8159-3121 [employer]. Care instructions adapted under license by [employer] (which disclaims liability or warranty for this information). If you have questions about a medical condition or this instruction, always ask your healthcare professional. [address] any warranty or liability for your use of this information.

## 2018-05-15 ENCOUNTER — PATIENT OUTREACH (OUTPATIENT)
Dept: INTERNAL MEDICINE CLINIC | Age: 83
End: 2018-05-15

## 2018-05-15 NOTE — PROGRESS NOTES
Hospital ER Discharge Follow-Up      Date/Time:  5/15/2018 2:11 PM    Patient was seen at [facility] on 5/13/18 for     Diagnoses   Fall, initial encounter    Right elbow pain    Skin tear of right upper arm without complication, initial encounter    Skin tear of right lower leg without complication, initial encounter      The physician discharge summary was available at the time of outreach. Patient's daughter was contacted within 2 business days of discharge. Spoke briefly with daughter who requested to call this NN back, currently at an appt. Contact info provided.

## 2018-08-01 NOTE — TELEPHONE ENCOUNTER
----- Message from Lisa Oconnor sent at 8/1/2018 12:40 PM EDT -----  Regarding: Dr. Reba Perry from [address] [place] [address] [address] is checking on the status for the medical necessity for a wheelchair and other documentation.  Callback:   [phone]  Ext :[phone]

## 2018-08-10 NOTE — TELEPHONE ENCOUNTER
[facility] states they received paperwork but no medical visit notes .  Requesting notes to be faxed to them at [phone]

## 2018-08-22 NOTE — ED TRIAGE NOTES
Patient arrives from nursing home by EMS for an unwitnessed ground level fall that occurred just prior to arrival. Patient reports generalized pain.

## 2018-08-22 NOTE — ED PROVIDER NOTES
HPI Comments: 80 y.o. female with extensive past medical history, please see list, significant for dementia, HTN, high cholesterol, colon cancer, hematoma of breast, umbilical hernia, osteoporosis, arthritis, S/P right hip replacement, and depression who presents to the ED via EMS for evaluation following a GLF. Pt reports she had an unwitnessed GLF just prior to arrival at her nursing home and hit the back of her head. Denies LOC or any injury. Pt states she remembers falling but is unsure what caused her to fall. Pt now complains of right knee pain. Pt denies any recent illness. Pt denies chest pain, SOB, cough, urinary sx, vomiting, diarrhea, or abdominal pain. There are no other acute medical complaints voiced at this time. Chart review: Old chart reviewed - last ED visit was for a fall in May of this year. DC home. Full history, physical exam, and ROS unable to be obtained due to: dementia. Social Hx: Former smoker. Occasional EtOH use. Lives in a nursing home. PCP: Vikash Davenport MD    Note written by Anahi Burrell, as dictated by Sreedhar العلي, DO 6:40 PM     The history is provided by the patient and medical records. History limited by: hx of dementia.         Past Medical History:   Diagnosis Date    Arthritis     Dr. Antonieta Acosta [facility])     colon CA in situ    Dementia     Depression     Hematoma (nontraumatic) of breast     Dr. Modesta Claude cox breast surgeon    High cholesterol     Hypertension     Osteoporosis     Dr. Alvaro Middleton fall fractured vertebrae    Psychiatric disorder     depression    Umbilical hernia        Past Surgical History:   Procedure Laterality Date    HX COLECTOMY  1985    HX HEENT      cateracts bilateral    HX HIP REPLACEMENT  2009    Right    HX ORTHOPAEDIC  2012    T11 & L1 vertebrae fx    HX PELVIC FRACTURE TX  2010         Family History:   Problem Relation Age of Onset    Heart Disease Mother        Social History Social History    Marital status:      Spouse name: N/A    Number of children: N/A    Years of education: N/A     Occupational History    Not on file. Social History Main Topics    Smoking status: Former Smoker     Packs/day: 0.50     Quit date: 1/1/1970    Smokeless tobacco: Never Used    Alcohol use Yes      Comment: occasional    Drug use: No    Sexual activity: No     Other Topics Concern    Not on file     Social History Narrative    [facility]    Can cook if she wants        3 children    2 daughters and 1 son        No smoke        Drink- wine, or bourbon or vodka 1 ounce         ALLERGIES: Latex; Codeine; Neosporin [benzalkonium chloride]; and Sulfa dyne    Review of Systems   Unable to perform ROS: Dementia       Vitals:    08/22/18 1807   BP: 132/61   Pulse: 63   Resp: 18   Temp: 97.6 °F (36.4 °C)   SpO2: 95%   Weight: 74.8 kg (165 lb)            Physical Exam   Nursing note and vitals reviewed. Constitutional: Pt is awake and alert. Pt appears frail, elderly. NAD. HENT:   Head: Normocephalic and atraumatic. No hematoma noted. Nontender, no swelling. Nose: Nose normal.   Mouth/Throat: Oropharynx is clear and moist. No oropharyngeal exudate. Eyes: Conjunctivae and extraocular motions are normal. Pupils are equal, round, and reactive to light. Right eye exhibits no discharge. Left eye exhibits no discharge. No scleral icterus. Neck: No tracheal deviation present. Supple neck. Nontender. Cardiovascular: Normal rate, regular rhythm, normal heart sounds and intact distal pulses. Exam reveals no gallop and no friction rub. No murmur heard. Pulmonary/Chest: Effort normal.  Lying flat breathing normally. Pt  has no wheezes. Crackles in bases, otherwise lungs clear. Abdominal: Soft. Pt  exhibits no distension and no mass. No tenderness. Pt  has no rebound and no guarding. Musculoskeletal:  Pt  exhibits no edema. Nontender spine. Shoulders, elbows, and wrists full ROM and no trauma. Right hip full ROM. Right knee pain with ROM, no deformity. Left hip and left knee full ROM with no pain. Ankles nontender and no edema. Ext: Distal pulses are normal, no edema. Neurological:  Pt is alert. nonfocal neuro exam.  Skin: Skin is warm and dry. Pt  is not diaphoretic. Psychiatric:  Pt  has a normal mood and affect. Behavior is normal.       Note written by Anhai Sharma, as dictated by Mark Whitaker DO 6:41 PM      [facility]      ED Course       Procedures    PROGRESS NOTE:  7:00 PM   Change of shift. Care of patient signed over to Dr. Khushbu Bull. Bedside handoff complete. At sign out, I talked with the pt's daughter, who had arrived. Updated her on labs, imaging pending. She was concerned about her mother being a \"frequent user\" of the ED. I looked at past visits. These are infrequent and I told her that these visits were reasonable. I told her that Dr Khushbu Bull would look over her results when back and let her know about them. ED EKG interpretation:  Rhythm:NSR, regular. Rate 67. NL axis/intervals. No acute sttwa seen. This EKG was interpreted by Mark Whitaker DO,ED Provider.

## 2018-08-23 NOTE — ED NOTES
Progress Note:   Pt has been reexamined by Karin Tay MD. Pt is feeling much better. Symptoms have improved. All available results have been reviewed with pt and any available family. Pt understands sx, dx, and tx in ED. Care plan has been outlined and questions have been answered. Pt is ready to go home. Will send home on Fall/ Head injury/ Weakness instructions. Prescription of Zofran/ Tylenol for pain as needed. Outpatient referral with PCP as needed. Written by Karin Tay MD,8:22 PM    .   .

## 2018-08-23 NOTE — ED NOTES
Attempted to call [address] multiple times without answer to update them on patient's status and that she will be returning and patient's daughter will require assistance. Called patient's daughter and left a voicemail informing her that staff are not picking up the phone.

## 2018-08-23 NOTE — ED NOTES
Discharge instructions given to pt. All questions answered and pt verbalized understanding. V/S stable at time of discharge. Pt off of unit by wheelchair.

## 2018-08-23 NOTE — DISCHARGE INSTRUCTIONS
Learning About a Closed Head Injury  What is a closed head injury? A closed head injury happens when your head gets hit hard. The strong force of the blow causes your brain to shake in your skull. This movement can cause the brain to bruise, swell, or tear. Sometimes nerves or blood vessels also get damaged. This can cause bleeding in or around the brain. A concussion is a type of closed head injury. What are the symptoms? If you have a mild concussion, you may have a mild headache or feel \"not quite right. \" These symptoms are common. They usually go away over a few days to 4 weeks. But sometimes after a concussion, you feel like you can't function as well as before the injury. And you have new symptoms. This is called postconcussive syndrome. You may:  · Find it harder to solve problems, think, concentrate, or remember. · Have headaches. · Have changes in your sleep patterns, such as not being able to sleep or sleeping all the time. · Have changes in your personality. · Not be interested in your usual activities. · Feel angry or anxious without a clear reason. · Lose your sense of taste or smell. · Be dizzy, lightheaded, or unsteady. It may be hard to stand or walk. How is a closed head injury treated? Any person who may have a concussion needs to see a doctor. Some people have to stay in the hospital to be watched. Others can go home safely. If you go home, follow your doctor's instructions. He or she will tell you if you need someone to watch you closely for the next 24 hours or longer. Rest is the best treatment. Get plenty of sleep at night. And try to rest during the day. · Avoid activities that are physically or mentally demanding. These include housework, exercise, and schoolwork. And don't play video games, send text messages, or use the computer. You may need to change your school or work schedule to be able to avoid these activities.   · Ask your doctor when it's okay to drive, ride a bike, or operate machinery. · Take an over-the-counter pain medicine, such as acetaminophen (Tylenol), ibuprofen (Advil, Motrin), or naproxen (Aleve). Be safe with medicines. Read and follow all instructions on the label. · Check with your doctor before you use any other medicines for pain. · Do not drink alcohol or use illegal drugs. They can slow recovery. They can also increase your risk of getting a second head injury. Follow-up care is a key part of your treatment and safety. Be sure to make and go to all appointments, and call your doctor if you are having problems. It's also a good idea to know your test results and keep a list of the medicines you take. Where can you learn more? Go to http://suzanne-abdi.info/. Enter E235 in the search box to learn more about \"Learning About a Closed Head Injury. \"  Current as of: October 9, 2017  Content Version: 11.7  © 4083-9588 [employer]. Care instructions adapted under license by [employer] (which disclaims liability or warranty for this information). If you have questions about a medical condition or this instruction, always ask your healthcare professional. [address] any warranty or liability for your use of this information. Preventing Falls: Care Instructions  Your Care Instructions    Getting around your home safely can be a challenge if you have injuries or health problems that make it easy for you to fall. Loose rugs and furniture in walkways are among the dangers for many older people who have problems walking or who have poor eyesight. People who have conditions such as arthritis, osteoporosis, or dementia also have to be careful not to fall. You can make your home safer with a few simple measures. Follow-up care is a key part of your treatment and safety. Be sure to make and go to all appointments, and call your doctor if you are having problems.  It's also a good idea to know your test results and keep a list of the medicines you take. How can you care for yourself at home? Taking care of yourself  · You may get dizzy if you do not drink enough water. To prevent dehydration, drink plenty of fluids, enough so that your urine is light yellow or clear like water. Choose water and other caffeine-free clear liquids. If you have kidney, heart, or liver disease and have to limit fluids, talk with your doctor before you increase the amount of fluids you drink. · Exercise regularly to improve your strength, muscle tone, and balance. Walk if you can. Swimming may be a good choice if you cannot walk easily. · Have your vision and hearing checked each year or any time you notice a change. If you have trouble seeing and hearing, you might not be able to avoid objects and could lose your balance. · Know the side effects of the medicines you take. Ask your doctor or pharmacist whether the medicines you take can affect your balance. Sleeping pills or sedatives can affect your balance. · Limit the amount of alcohol you drink. Alcohol can impair your balance and other senses. · Ask your doctor whether calluses or corns on your feet need to be removed. If you wear loose-fitting shoes because of calluses or corns, you can lose your balance and fall. · Talk to your doctor if you have numbness in your feet. Preventing falls at home  · Remove raised doorway thresholds, throw rugs, and clutter. Repair loose carpet or raised areas in the floor. · Move furniture and electrical cords to keep them out of walking paths. · Use nonskid floor wax, and wipe up spills right away, especially on ceramic tile floors. · If you use a walker or cane, put rubber tips on it. If you use crutches, clean the bottoms of them regularly with an abrasive pad, such as steel wool. · Keep your house well lit, especially Earnest Estimable, and outside walkways. Use night-lights in areas such as hallways and bathrooms. Add extra light switches or use remote switches (such as switches that go on or off when you clap your hands) to make it easier to turn lights on if you have to get up during the night. · Install sturdy handrails on stairways. · Move items in your cabinets so that the things you use a lot are on the lower shelves (about waist level). · Keep a cordless phone and a flashlight with new batteries by your bed. If possible, put a phone in each of the main rooms of your house, or carry a cell phone in case you fall and cannot reach a phone. Or, you can wear a device around your neck or wrist. You push a button that sends a signal for help. · Wear low-heeled shoes that fit well and give your feet good support. Use footwear with nonskid soles. Check the heels and soles of your shoes for wear. Repair or replace worn heels or soles. · Do not wear socks without shoes on wood floors. · Walk on the grass when the sidewalks are slippery. If you live in an area that gets snow and ice in the winter, sprinkle salt on slippery steps and sidewalks. Preventing falls in the bath  · Install grab bars and nonskid mats inside and outside your shower or tub and near the toilet and sinks. · Use shower chairs and bath benches. · Use a hand-held shower head that will allow you to sit while showering. · Get into a tub or shower by putting the weaker leg in first. Get out of a tub or shower with your strong side first.  · Repair loose toilet seats and consider installing a raised toilet seat to make getting on and off the toilet easier. · Keep your bathroom door unlocked while you are in the shower. Where can you learn more? Go to http://suzanne-abdi.info/. Enter 0476 79 69 [id number] in the search box to learn more about \"Preventing Falls: Care Instructions. \"  Current as of: May 12, 2017  Content Version: 11.7  © 3708-0467 [employer]current.  Care instructions adapted under license by [employer] (which disclaims liability or warranty for this information). If you have questions about a medical condition or this instruction, always ask your healthcare professional. [place] [address] any warranty or liability for your use of this information.

## 2018-08-24 NOTE — PROGRESS NOTES
ED Discharge Follow-Up    Date/Time:     2018 2:01 PM    Patient presented to [facility]  ED on 18 and was diagnosed with fall. Top Challenges reviewed with the provider   4-5 falls past 12 months  eczema flair         Method of communication with provider : none    Nurse Navigator(NN) contacted the  family  by telephone to perform post ED discharge assessment. Verified name and  with family as identifiers. Provided introduction to self, and explanation of the Nurse Navigator role. Family reported assessment: Dtr reported pt is doing since fall. Main complaint is issues with eczema, seeing dermatologist. Scheduled to have carcinoma spot removed from nose 18    Medication(s):   New Medications at Discharge: n/a  Changed Medications at Discharge: n/a  Discontinued Medications at Discharge: acetaminophen (TYLENOL) 500 mg tablet    There were no barriers to obtaining medications identified at this time. Reviewed discharge instructions and red flags with  family who voiced understanding. Family given an opportunity to ask questions and does not have any further questions or concerns at this time. The family agrees to contact the PCP office for questions related to their healthcare. Patient reminded that there are physicians on call 24 hours a day / 7 days a week (M- 5pm to 8am and from Friday 5pm until Monday 8am for the weekend) should the patient have questions or concerns. NN provided contact information for future reference. Offered follow up appointment with PCP: yes   [facility] follow up appointment(s): Future Appointments  Date Time Provider Shelby Karimi   2018 10:20 AM Randi Bullard MD [address]      Non-[facility] follow up appointment(s): 18 derm  [employer]:  n/a    [email or web address] 9 AM- 9 PM.   Phone [phone]      Goals      Attends follow-up appointments as directed.

## 2018-08-28 NOTE — MR AVS SNAPSHOT
[facility] 
 
 
 [address] 
[phone] Patient: Elba Sandoval MRN: [id number] Visit Information Date & Time Provider Department Dept. Phone Encounter #  
 2018 10:20 AM Vikash Davenport MD [facility] [address] [id number] Upcoming Health Maintenance Date Due ZOSTER VACCINE AGE 60> 1984 GLAUCOMA SCREENING Q2Y 1989 Pneumococcal 65+ High/Highest Risk (2 of 2 - PPSV23) 2017 Influenza Age 5 to Adult 2018 MEDICARE YEARLY EXAM 10/20/2018 DTaP/Tdap/Td series (2 - Td) 2024 Allergies as of 2018  Review Complete On: 2018 By: Vikash Davenport MD  
  
 Severity Noted Reaction Type Reactions Latex  2012    Unknown (comments) Pt unable to report Codeine  2012    Unknown (comments) Neosporin [Benzalkonium Chloride]  2017    Rash  
 Sulfa Dyne  2012    Other (comments) \"Crying jag\" Current Immunizations  Reviewed on 10/5/2017 Name Date Influenza High Dose Vaccine PF 10/5/2017, 10/4/2016 Influenza Vaccine 10/1/2014, 2012 Pneumococcal Vaccine (Unspecified Type) 2012 Tdap 2014  2:44 AM  
  
 Not reviewed this visit Vitals BP Pulse Temp Resp Height(growth percentile) Weight(growth percentile) 118/76 (BP 1 Location: Right arm, BP Patient Position: Sitting) 77 98.2 °F (36.8 °C) (Oral) 14 5' 4\" (1.626 m) 165 lb (74.8 kg) SpO2 BMI OB Status Smoking Status 97% 28.32 kg/m2 Postmenopausal Former Smoker BMI and BSA Data Body Mass Index Body Surface Area  
 28.32 kg/m 2 1.84 m 2 Preferred Pharmacy Pharmacy Name Phone [facility] [phone] Your Updated Medication List  
  
   
 This list is accurate as of 8/28/18 11:11 AM.  Always use your most recent med list.  
  
  
  
  
 acetaminophen 500 mg tablet Commonly known as:  Jr Aly Hoover Se Take 2 Tabs by mouth every six (6) hours as needed for Pain. * albuterol 2 mg/5 mL syrup Commonly known as:  Naomie Lan Take 5 mL by mouth four (4) times daily as needed. Please give pt if she is coughing. If not improving need rx and nebulizer. * albuterol 2.5 mg /3 mL (0.083 %) nebulizer solution Commonly known as:  PROVENTIL VENTOLIN  
3 mL by Nebulization route two (2) times a day. BISMATROL 262 mg/15 mL suspension Generic drug:  bismuth subsalicylate Take 30 mL by mouth as needed (After each loose stool). cholecalciferol 1,000 unit Cap Commonly known as:  VITAMIN D3 Take 1 capsule by mouth daily diphenhydrAMINE 12.5 mg/5 mL syrup Commonly known as:  BENADRYL ALLERGY Take 10 mL by mouth nightly as needed. Indications: Urticaria  
  
 escitalopram oxalate 10 mg tablet Commonly known as:  Santo Bias Take 1 Tab by mouth daily. fluocinoNIDE 0.05 % ointment Commonly known as:  LIDEX Apply  to affected area two (2) times a day. fluticasone 50 mcg/actuation nasal spray Commonly known as:  Rakan Sacks 2 Sprays by Both Nostrils route daily. guaiFENesin  mg ER tablet Commonly known as:  Samy & Samy Take 1 Tab by mouth two (2) times a day. Follow with 6 oz of water  
  
 montelukast 10 mg tablet Commonly known as:  SINGULAIR Take 1 Tab by mouth daily. ondansetron 8 mg disintegrating tablet Commonly known as:  ZOFRAN ODT Take 1 Tab by mouth every eight (8) hours as needed for Nausea. predniSONE 10 mg tablet Commonly known as:  Antonio Sprawls Take 4 tab for 3 days then 3 tabs for 3 days then 2 tabs for 2 days then 1 tab PROLIA 60 mg/mL injection Generic drug:  denosumab 60 mg by SubCUTAneous route. therapeutic multivitamin tablet Commonly known as:  [facility] Take 1 Tab by mouth daily. traMADol 50 mg tablet Commonly known as:  ULTRAM  
Take 1 Tab by mouth every six (6) hours as needed for Pain. Max Daily Amount: 200 mg.  
  
 triamcinolone acetonide 0.1 % topical cream  
Commonly known as:  KENALOG Apply  to affected area two (2) times a day. use thin layer * Notice: This list has 2 medication(s) that are the same as other medications prescribed for you. Read the directions carefully, and ask your doctor or other care provider to review them with you. Prescriptions Sent to Pharmacy Refills  
 montelukast (SINGULAIR) 10 mg tablet 1 Sig: Take 1 Tab by mouth daily. Class: Normal  
 Pharmacy: [address] [place] [address] #: [phone] Route: Oral  
  
Patient Instructions Dry Skin: Care Instructions Your Care Instructions Dry skin is a common problem, especially in areas where the air is very dry. Dry skin can also become a problem as you get older and lose natural oils that keep your skin moist. 
A tendency toward dry, itchy skin may run in families. Some problems with the body's defenses (immune system), allergies, or an infection with a fungus may also cause patches of dry skin. An over-the-counter cream may help your dry skin. If your skin problem does not get better with home treatment, your doctor may prescribe ointment. You may need antibiotics if you have a skin infection. Follow-up care is a key part of your treatment and safety. Be sure to make and go to all appointments, and call your doctor if you are having problems. It's also a good idea to know your test results and keep a list of the medicines you take. How can you care for yourself at home? Showers and baths · Keep showers and baths short, and use warm or lukewarm water. Don't use hot water. It takes off more of your skin's natural oils. · Use as little soap as you can. Choose a mild soap, such as Dove, Cetaphil, or Neutrogena. Or use a skin cleanser like Aquanil or Cetaphil. · If you are taking a bath, use soap only at the very end. Then rinse off all traces of soap with fresh water. Gently pat your skin dry with a towel. Skin creams and moisturizers · Apply moisturizer or skin cream right away (within 3 minutes) after a bath or shower. Use a moisturizer at other times too, as often as you need it. · Moisturizing creams are better than lotions. Try brands like CeraVe cream, Cetaphil cream, or Eucerin cream. 
Other tips · When washing clothes, use a small amount of detergent. Don't use fabric softeners or dryer sheets. · For small areas of itchy skin, try an over-the-counter 1% hydrocortisone cream. 
· If you have very dry hands, spread petroleum jelly (such as Vaseline) on your hands before bed. Wear thin cotton gloves while you sleep. If your feet are dry, spread Vaseline on them and wear socks while you sleep. When should you call for help? Call your doctor now or seek immediate medical care if: 
  · You have signs of infection, such as: 
¨ Pain, warmth, or swelling in the skin. ¨ Red streaks near a wound in the skin. ¨ Pus coming from a wound in your skin. ¨ A fever.  
 Watch closely for changes in your health, and be sure to contact your doctor if: 
  · You do not get better as expected. Where can you learn more? Go to http://suzanne-abdi.info/. Enter X657 in the search box to learn more about \"Dry Skin: Care Instructions. \" Current as of: October 5, 2017 Content Version: 11.7 © 1027-8714 Aimetis. Care instructions adapted under license by [employer] (which disclaims liability or warranty for this information).  If you have questions about a medical condition or this instruction, always ask your healthcare professional. [employer] disclaims any warranty or liability for your use of this information. Introducing [facility]! Dear Ricky Beckett: Thank you for requesting a Resermap account. Our records indicate that you already have an active Resermap account. You can access your account anytime at [email or web address] Did you know that you can access your hospital and ER discharge instructions at any time in Resermap? You can also review all of your test results from your hospital stay or ER visit. Additional Information If you have questions, please visit the Frequently Asked Questions section of the Resermap website at [email or web address] Remember, Resermap is NOT to be used for urgent needs. For medical emergencies, dial 911. Now available from your iPhone and Android! Please provide this summary of care documentation to your next provider. Your primary care clinician is listed as [facility]. If you have any questions after today's visit, please call [phone].

## 2018-08-28 NOTE — PATIENT INSTRUCTIONS
Dry Skin: Care Instructions Your Care Instructions Dry skin is a common problem, especially in areas where the air is very dry. Dry skin can also become a problem as you get older and lose natural oils that keep your skin moist. 
A tendency toward dry, itchy skin may run in families. Some problems with the body's defenses (immune system), allergies, or an infection with a fungus may also cause patches of dry skin. An over-the-counter cream may help your dry skin. If your skin problem does not get better with home treatment, your doctor may prescribe ointment. You may need antibiotics if you have a skin infection. Follow-up care is a key part of your treatment and safety. Be sure to make and go to all appointments, and call your doctor if you are having problems. It's also a good idea to know your test results and keep a list of the medicines you take. How can you care for yourself at home? Showers and baths · Keep showers and baths short, and use warm or lukewarm water. Don't use hot water. It takes off more of your skin's natural oils. · Use as little soap as you can. Choose a mild soap, such as Dove, Cetaphil, or Neutrogena. Or use a skin cleanser like Aquanil or Cetaphil. · If you are taking a bath, use soap only at the very end. Then rinse off all traces of soap with fresh water. Gently pat your skin dry with a towel. Skin creams and moisturizers · Apply moisturizer or skin cream right away (within 3 minutes) after a bath or shower. Use a moisturizer at other times too, as often as you need it. · Moisturizing creams are better than lotions. Try brands like CeraVe cream, Cetaphil cream, or Eucerin cream. 
Other tips · When washing clothes, use a small amount of detergent. Don't use fabric softeners or dryer sheets.  
· For small areas of itchy skin, try an over-the-counter 1% hydrocortisone cream. 
· If you have very dry hands, spread petroleum jelly (such as Vaseline) on your hands before bed. Wear thin cotton gloves while you sleep. If your feet are dry, spread Vaseline on them and wear socks while you sleep. When should you call for help? Call your doctor now or seek immediate medical care if: 
  · You have signs of infection, such as: 
¨ Pain, warmth, or swelling in the skin. ¨ Red streaks near a wound in the skin. ¨ Pus coming from a wound in your skin. ¨ A fever.  
 Watch closely for changes in your health, and be sure to contact your doctor if: 
  · You do not get better as expected. Where can you learn more? Go to http://suzanne-abdi.info/. Enter X508 in the search box to learn more about \"Dry Skin: Care Instructions. \" Current as of: October 5, 2017 Content Version: 11.7 © 3849-1023 [employer]. Care instructions adapted under license by [employer] (which disclaims liability or warranty for this information). If you have questions about a medical condition or this instruction, always ask your healthcare professional. [place] [address] any warranty or liability for your use of this information.

## 2018-08-28 NOTE — PROGRESS NOTES
Chief Complaint Patient presents with  Dry Skin Patient presents today with her daughter for evaluation of a wheelchair. The daughter also notes that the patient is still having a difficult time with her eczema. Eczema Her daughter patient has had a skin biopsy by dermatology, Dr. Tori Neff. She was given triamcinolone and flunisolide but daughter adamantly reports that neither one of these are helping her mother. She reports when she was on a steroid Dosepak her symptoms improved however when the steroids were taken off the patient's eczema became worse again. Daughter thinks patient is getting daily use of moisturizers. Subjective:  
Mary Jo Neville is a 80 y.o. female with hypertension. Hypertension ROS: taking medications as instructed, no medication side effects noted, no TIA's, no chest pain on exertion, no dyspnea on exertion, no swelling of ankles. New concerns: none. Wheelchair assessment Patient has a history of ground-level falls. She has gait and balance issues. She has been to the ER several times due to these falls. Patient needs a modified wheelchair due to patient's risk for sliding forward out of her wheelchair and onto the floor. Patient was assessed by Occupational Therapy and provisions and modifications were recommended. Patient needs standby assistance with transition from bed to wheelchair and wheelchair to bed. Patient has dementia. Past Medical History:  
Diagnosis Date  Arthritis Dr. Jonathan Burt  Cancer ([facility] [address]   
 colon CA in situ  Dementia  Depression  Hematoma (nontraumatic) of breast   
 Dr. Jerome freeman breast surgeon  High cholesterol  Hypertension  Osteoporosis Dr. Fidencio Batista fall fractured vertebrae  Psychiatric disorder   
 depression  Sebaceous carcinoma 2018  Umbilical hernia Past Surgical History:  
Procedure Laterality Date [address] [facility],[address]  HX HEENT    
 cateracts bilateral  
 [phone] REPLACEMENT  2009 Right  HX ORTHOPAEDIC  2012 T11 & L1 vertebrae fx  HX PELVIC FRACTURE [place]  2010 Social History Social History  Marital status:  Spouse name: N/A  
 Number of children: N/A  
 Years of education: N/A Social History Main Topics  Smoking status: Former Smoker Packs/day: 0.50 Quit date: 1/1/1970  Smokeless tobacco: Never Used  Alcohol use Yes Comment: occasional  
 Drug use: No  
 Sexual activity: No  
 
Other Topics Concern  None Social History Narrative Jacque Romo, [facility] Can cook if she wants 3 children 2 daughters and 1 son No smoke Drink- wine, or bourbon or vodka 1 ounce Family History Problem Relation Age of Onset  Heart Disease Mother Current Outpatient Prescriptions Medication Sig Dispense Refill  fluocinoNIDE (LIDEX) 0.05 % ointment Apply  to affected area two (2) times a day.  denosumab (PROLIA) 60 mg/mL injection 60 mg by SubCUTAneous route.  diphenhydrAMINE (BENADRYL ALLERGY) 12.5 mg/5 mL syrup Take 10 mL by mouth nightly as needed. Indications: Urticaria 236 mL 0  
 triamcinolone acetonide (KENALOG) 0.1 % topical cream Apply  to affected area two (2) times a day. use thin layer 45 g 0  
 albuterol (PROVENTIL VENTOLIN) 2.5 mg /3 mL (0.083 %) nebulizer solution 3 mL by Nebulization route two (2) times a day. 24 Each 3  
 traMADol (ULTRAM) 50 mg tablet Take 1 Tab by mouth every six (6) hours as needed for Pain. Max Daily Amount: 200 mg. 30 Tab 0  
 escitalopram oxalate (LEXAPRO) 10 mg tablet Take 1 Tab by mouth daily. 90 Tab 1  Cholecalciferol, Vitamin D3, 1,000 unit cap Take 1 capsule by mouth daily 90 Cap 1  
 acetaminophen (TYLENOL EXTRA STRENGTH) 500 mg tablet Take 2 Tabs by mouth every six (6) hours as needed for Pain.  30 Tab 0  
 ondansetron (ZOFRAN ODT) 8 mg disintegrating tablet Take 1 Tab by mouth every eight (8) hours as needed for Nausea. 15 Tab 0  
 guaiFENesin ER (MUCINEX) 600 mg ER tablet Take 1 Tab by mouth two (2) times a day. Follow with 6 oz of water 60 Tab 1  predniSONE (DELTASONE) 10 mg tablet Take 4 tab for 3 days then 3 tabs for 3 days then 2 tabs for 2 days then 1 tab 30 Tab 0  
 fluticasone (FLONASE) 50 mcg/actuation nasal spray 2 Sprays by Both Nostrils route daily. 1 Bottle 3  therapeutic multivitamin (THERAGRAN) tablet Take 1 Tab by mouth daily.  bismuth subsalicylate (BISMATROL) 262 mg/15 mL suspension Take 30 mL by mouth as needed (After each loose stool).  albuterol (PROVENTIL, VENTOLIN) 2 mg/5 mL syrup Take 5 mL by mouth four (4) times daily as needed. Please give pt if she is coughing. If not improving need rx and nebulizer. 473 mL 2 Allergies Allergen Reactions  Latex Unknown (comments) Pt unable to report  Codeine Unknown (comments)  Neosporin [Benzalkonium Chloride] Rash  Sulfa Dyne Other (comments) \"Crying jag\" Review of Systems - General ROS: negative for - chills, fatigue, fever, hot flashes, malaise or night sweats Cardiovascular ROS: no chest pain or dyspnea on exertion Respiratory ROS: no cough, shortness of breath, or wheezing Visit Vitals  /76 (BP 1 Location: Right arm, BP Patient Position: Sitting)  Pulse 77  Temp 98.2 °F (36.8 °C) (Oral)  Resp 14  
 Ht 5' 4\" (1.626 m)  Wt 165 lb (74.8 kg) Comment: PT DAUGHTER  SpO2 97%  BMI 28.32 kg/m2 General Appearance:  Well developed, well nourished,alert and oriented x 3, and individual in no acute distress. Patient is sitting in wheelchair but pelvis is forward and thorax is slumped into backrest.  She appears to be sliding out of the we wheelchair. Ears/Nose/Mouth/Throat:   Hearing grossly normal. 
Right scalp hematoma pain on palpation no cranial depression on palpation Neck: Supple, no lad, no bruits Chest:   Lungs clear to auscultation bilaterally. Cardiovascular:  Regular rate and rhythm, S1, S2 normal, no murmur. Abdomen:   Soft, non-tender, bowel sounds are active. Extremities: No edema bilaterally. Skin: Warm and dry, no suspicious lesions Diagnoses and all orders for this visit: 1. Eczema, unspecified type Not improving despite topical steroids. I discussed with patient's daughter that she should not be put on long-term oral steroids due to side effect profile. I can try her on Singulair. I did go over behavioral ways to decrease her eczema symptoms. The daughter assures me that she is getting cream applied daily. I increased her Eucerin use to 3 times a day by nursing to ensure that this is done. I will reach out to Dr. Donna Deal to see if there is anything else we can use. It is possible that she may want to try some Elidel. Called Dr. Donna Deal at 4:02 pm. 
 
2. Risk for falls Patient is here for wheelchair evaluation. I do think she needs a modified wheelchair. She is at risk for falling out of her chair. 3. Essential hypertension Continue medications Other orders 
-     montelukast (SINGULAIR) 10 mg tablet; Take 1 Tab by mouth daily. This note will not be viewable in [address] Follow up in 3 months

## 2018-08-30 NOTE — PROGRESS NOTES
Call placed to [facility] Fearing re: mother. I spoke with Dr. Leidy Salinas.   If pt does not improve with tid lubrications and rx steroid creams from dr. Freedom Rodarte then prednisone low dose would be last alternative at very low dose.  olga

## 2018-09-01 NOTE — Clinical Note
- Clean wound twice a day with mild soap and apply bacitracin. 
- Suture removal in 7 days. 
- Head CT and L tib/fib x-ray were unremarkable.

## 2018-09-01 NOTE — DISCHARGE INSTRUCTIONS
Learning About a Closed Head Injury  What is a closed head injury? A closed head injury happens when your head gets hit hard. The strong force of the blow causes your brain to shake in your skull. This movement can cause the brain to bruise, swell, or tear. Sometimes nerves or blood vessels also get damaged. This can cause bleeding in or around the brain. A concussion is a type of closed head injury. What are the symptoms? If you have a mild concussion, you may have a mild headache or feel \"not quite right. \" These symptoms are common. They usually go away over a few days to 4 weeks. But sometimes after a concussion, you feel like you can't function as well as before the injury. And you have new symptoms. This is called postconcussive syndrome. You may:  · Find it harder to solve problems, think, concentrate, or remember. · Have headaches. · Have changes in your sleep patterns, such as not being able to sleep or sleeping all the time. · Have changes in your personality. · Not be interested in your usual activities. · Feel angry or anxious without a clear reason. · Lose your sense of taste or smell. · Be dizzy, lightheaded, or unsteady. It may be hard to stand or walk. How is a closed head injury treated? Any person who may have a concussion needs to see a doctor. Some people have to stay in the hospital to be watched. Others can go home safely. If you go home, follow your doctor's instructions. He or she will tell you if you need someone to watch you closely for the next 24 hours or longer. Rest is the best treatment. Get plenty of sleep at night. And try to rest during the day. · Avoid activities that are physically or mentally demanding. These include housework, exercise, and schoolwork. And don't play video games, send text messages, or use the computer. You may need to change your school or work schedule to be able to avoid these activities.   · Ask your doctor when it's okay to drive, ride a bike, or operate machinery. · Take an over-the-counter pain medicine, such as acetaminophen (Tylenol), ibuprofen (Advil, Motrin), or naproxen (Aleve). Be safe with medicines. Read and follow all instructions on the label. · Check with your doctor before you use any other medicines for pain. · Do not drink alcohol or use illegal drugs. They can slow recovery. They can also increase your risk of getting a second head injury. Follow-up care is a key part of your treatment and safety. Be sure to make and go to all appointments, and call your doctor if you are having problems. It's also a good idea to know your test results and keep a list of the medicines you take. Where can you learn more? Go to http://suzanne-abdi.info/. Enter E235 in the search box to learn more about \"Learning About a Closed Head Injury. \"  Current as of: October 9, 2017  Content Version: 11.7  © 2791-2191 [employer]. Care instructions adapted under license by [employer] (which disclaims liability or warranty for this information). If you have questions about a medical condition or this instruction, always ask your healthcare professional. [place] [address] any warranty or liability for your use of this information. Cuts: Care Instructions  Your Care Instructions  A cut can happen anywhere on your body. Stitches, staples, skin adhesives, or pieces of tape called Steri-Strips are sometimes used to keep the edges of a cut together and help it heal. Steri-Strips can be used by themselves or with stitches or staples. Sometimes cuts are left open. If the cut went deep and through the skin, the doctor may have closed the cut in two layers. A deeper layer of stitches brings the deep part of the cut together. These stitches will dissolve and don't need to be removed.  The upper layer closure, which could be stitches, staples, Steri-Strips, or adhesive, is what you see on the cut.  A cut is often covered by a bandage. The doctor has checked you carefully, but problems can develop later. If you notice any problems or new symptoms, get medical treatment right away. Follow-up care is a key part of your treatment and safety. Be sure to make and go to all appointments, and call your doctor if you are having problems. It's also a good idea to know your test results and keep a list of the medicines you take. How can you care for yourself at home? If a cut is open or closed  · Prop up the sore area on a pillow anytime you sit or lie down during the next 3 days. Try to keep it above the level of your heart. This will help reduce swelling. · Keep the cut dry for the first 24 to 48 hours. After this, you can shower if your doctor okays it. Pat the cut dry. · Don't soak the cut, such as in a bathtub. Your doctor will tell you when it's safe to get the cut wet. · After the first 24 to 48 hours, clean the cut with soap and water 2 times a day unless your doctor gives you different instructions. ¨ Don't use hydrogen peroxide or alcohol, which can slow healing. ¨ You may cover the cut with a thin layer of petroleum jelly and a nonstick bandage. ¨ If the doctor put a bandage over the cut, put on a new bandage after cleaning the cut or if the bandage gets wet or dirty. · Avoid any activity that could cause your cut to reopen. · Be safe with medicines. Read and follow all instructions on the label. ¨ If the doctor gave you a prescription medicine for pain, take it as prescribed. ¨ If you are not taking a prescription pain medicine, ask your doctor if you can take an over-the-counter medicine. If the cut is closed with stitches, staples, or Steri-Strips  · Follow the above instructions for open or closed cuts. · Do not remove the stitches or staples on your own. Your doctor will tell you when to come back to have the stitches or staples removed.   · Leave Steri-Strips on until they fall off.  If the cut is closed with a skin adhesive  · Follow the above instructions for open or closed cuts. · Leave the skin adhesive on your skin until it falls off on its own. This may take 5 to 10 days. · Do not scratch, rub, or pick at the adhesive. · Do not put the sticky part of a bandage directly on the adhesive. · Do not put any kind of ointment, cream, or lotion over the area. This can make the adhesive fall off too soon. Do not use hydrogen peroxide or alcohol, which can slow healing. When should you call for help? Call your doctor now or seek immediate medical care if:    · You have new pain, or your pain gets worse.     · The skin near the cut is cold or pale or changes color.     · You have tingling, weakness, or numbness near the cut.     · The cut starts to bleed, and blood soaks through the bandage. Oozing small amounts of blood is normal.     · You have trouble moving the area near the cut.     · You have symptoms of infection, such as:  ¨ Increased pain, swelling, warmth, or redness around the cut. ¨ Red streaks leading from the cut. ¨ Pus draining from the cut. ¨ A fever.    Watch closely for changes in your health, and be sure to contact your doctor if:    · The cut reopens.     · You do not get better as expected. Where can you learn more? Go to http://suzanne-abdi.info/. Enter M735 in the search box to learn more about \"Cuts: Care Instructions. \"  Current as of: November 20, 2017  Content Version: 11.7  © 1624-7552 [employer]. Care instructions adapted under license by [employer] (which disclaims liability or warranty for this information). If you have questions about a medical condition or this instruction, always ask your healthcare professional. [address] any warranty or liability for your use of this information.          Preventing Falls: Care Instructions  Your Care Instructions    Getting around your home safely can be a challenge if you have injuries or health problems that make it easy for you to fall. Loose rugs and furniture in walkways are among the dangers for many older people who have problems walking or who have poor eyesight. People who have conditions such as arthritis, osteoporosis, or dementia also have to be careful not to fall. You can make your home safer with a few simple measures. Follow-up care is a key part of your treatment and safety. Be sure to make and go to all appointments, and call your doctor if you are having problems. It's also a good idea to know your test results and keep a list of the medicines you take. How can you care for yourself at home? Taking care of yourself  · You may get dizzy if you do not drink enough water. To prevent dehydration, drink plenty of fluids, enough so that your urine is light yellow or clear like water. Choose water and other caffeine-free clear liquids. If you have kidney, heart, or liver disease and have to limit fluids, talk with your doctor before you increase the amount of fluids you drink. · Exercise regularly to improve your strength, muscle tone, and balance. Walk if you can. Swimming may be a good choice if you cannot walk easily. · Have your vision and hearing checked each year or any time you notice a change. If you have trouble seeing and hearing, you might not be able to avoid objects and could lose your balance. · Know the side effects of the medicines you take. Ask your doctor or pharmacist whether the medicines you take can affect your balance. Sleeping pills or sedatives can affect your balance. · Limit the amount of alcohol you drink. Alcohol can impair your balance and other senses. · Ask your doctor whether calluses or corns on your feet need to be removed. If you wear loose-fitting shoes because of calluses or corns, you can lose your balance and fall. · Talk to your doctor if you have numbness in your feet.   Preventing falls at home  · Remove raised doorway thresholds, throw rugs, and clutter. Repair loose carpet or raised areas in the floor. · Move furniture and electrical cords to keep them out of walking paths. · Use nonskid floor wax, and wipe up spills right away, especially on ceramic tile floors. · If you use a walker or cane, put rubber tips on it. If you use crutches, clean the bottoms of them regularly with an abrasive pad, such as steel wool. · Keep your house well lit, especially [facility], and outside walkways. Use night-lights in areas such as hallways and bathrooms. Add extra light switches or use remote switches (such as switches that go on or off when you clap your hands) to make it easier to turn lights on if you have to get up during the night. · Install sturdy handrails on stairways. · Move items in your cabinets so that the things you use a lot are on the lower shelves (about waist level). · Keep a cordless phone and a flashlight with new batteries by your bed. If possible, put a phone in each of the main rooms of your house, or carry a cell phone in case you fall and cannot reach a phone. Or, you can wear a device around your neck or wrist. You push a button that sends a signal for help. · Wear low-heeled shoes that fit well and give your feet good support. Use footwear with nonskid soles. Check the heels and soles of your shoes for wear. Repair or replace worn heels or soles. · Do not wear socks without shoes on wood floors. · Walk on the grass when the sidewalks are slippery. If you live in an area that gets snow and ice in the winter, sprinkle salt on slippery steps and sidewalks. Preventing falls in the bath  · Install grab bars and nonskid mats inside and outside your shower or tub and near the toilet and sinks. · Use shower chairs and bath benches. · Use a hand-held shower head that will allow you to sit while showering.   · Get into a tub or shower by putting the weaker leg in first. Get out of a tub or shower with your strong side first.  · Repair loose toilet seats and consider installing a raised toilet seat to make getting on and off the toilet easier. · Keep your bathroom door unlocked while you are in the shower. Where can you learn more? Go to http://suzanne-abdi.info/. Enter 0476 [id number] in the search box to learn more about \"Preventing Falls: Care Instructions. \"  Current as of: May 12, 2017  Content Version: 11.7  © 3614-0553 [employer]. Care instructions adapted under license by [employer] (which disclaims liability or warranty for this information). If you have questions about a medical condition or this instruction, always ask your healthcare professional. [address] any warranty or liability for your use of this information.

## 2018-09-01 NOTE — ED PROVIDER NOTES
HPI Comments: 80 y.o. female with past medical history significant for HTN, osteoporosis, and colon cancer who presents from [facility] via EMS with chief complaint of a fall. Pt reports she fell backwards 2 hours ago in her assisted living home, hit her posterior head, and cut her lower L leg. There are no other acute medical concerns at this time. Full history, physical exam, and ROS unable to be obtained due to:  Poor historian. Note written by Anahi Hardwick, as dictated by Bety Dodd MD 2:39 AM 
 
The history is provided by the patient. Past Medical History:  
Diagnosis Date  Arthritis  Cancer ([facility] [address]  Colon cancer ([facility] [address]  Depression  Hypertension  Osteoporosis  Psychiatric disorder History reviewed. No pertinent surgical history. History reviewed. No pertinent family history. Social History Social History  Marital status: SINGLE Spouse name: N/A  
 Number of children: N/A  
 Years of education: N/A Occupational History  Not on file. Social History Main Topics  Smoking status: Unknown If Ever Smoked  Smokeless tobacco: Not on file  Alcohol use Not on file  Drug use: Not on file  Sexual activity: Not on file Other Topics Concern  Not on file Social History Narrative  No narrative on file ALLERGIES: Latex; Codeine; and Sulfa (sulfonamide antibiotics) Review of Systems Unable to perform ROS: Other (Poor historian/dementia) Vitals:  
 09/01/18 0231 BP: 156/88 Pulse: 89 Resp: 16 Temp: 98.3 °F (36.8 °C) SpO2: 94% Physical Exam  
Constitutional: She is oriented to person, place, and time. She appears well-developed and well-nourished. HENT:  
Head: Normocephalic. Mouth/Throat: Oropharynx is clear and moist.  
5 cm hematoma over posterior scalp. Eyes: Conjunctivae are normal.  
Neck: Normal range of motion. Neck supple. Cardiovascular: Normal rate, regular rhythm and normal heart sounds. Pulmonary/Chest: Effort normal and breath sounds normal.  
Abdominal: Soft. Bowel sounds are normal. There is no tenderness. Musculoskeletal: Normal range of motion. She exhibits no edema or tenderness. Neurological: She is alert and oriented to person, place, and time. Skin: Skin is warm and dry. Laceration noted. 3 cm laceration over L lower leg. Psychiatric: She has a normal mood and affect. Her behavior is normal.  
Nursing note and vitals reviewed. Note written by Anahi Perry, as dictated by Alberto Sommer MD 2:39 AM 
 
[facility] 
 
 
ED Course Wound Repair 
Date/Time: 9/1/2018 5:20 AM 
Performed by: attendingPreparation: skin prepped with Betadine and sterile field established Pre-procedure re-eval: Immediately prior to the procedure, the patient was reevaluated and found suitable for the planned procedure and any planned medications. Time out: Immediately prior to the procedure a time out was called to verify the correct patient, procedure, equipment, staff and marking as appropriate. Miakla Card Location details: left leg Wound length:2.6 - 7.5 cm Anesthesia: local infiltration Anesthesia: 
Local Anesthetic: lidocaine 1% with epinephrine Anesthetic total: 5 mL Foreign bodies: no foreign bodies Irrigation solution: saline Irrigation method: syringe Debridement: minimal 
Skin closure: 4-0 nylon Number of sutures: 6 Technique: simple and interrupted Approximation: close Dressing: antibiotic ointment and non-adhesive packing strip Patient tolerance: Patient tolerated the procedure well with no immediate complications My total time at bedside, performing this procedure was 1-15 minutes. A/P: 
1. Fall 2. L leg laceration - suture removal in 7-10 days. 3. Need for tdap - given Patient's results have been reviewed with them.   Patient and/or family have verbally conveyed their understanding and agreement of the patient's signs, symptoms, diagnosis, treatment and prognosis and additionally agree to follow up as recommended or return to the Emergency Room should their condition change prior to follow-up. Discharge instructions have also been provided to the patient with some educational information regarding their diagnosis as well a list of reasons why they would want to return to the ER prior to their follow-up appointment should their condition change.

## 2018-09-01 NOTE — ED TRIAGE NOTES
PT TO ED VIA EMS FROM [facility] FOR FALL OUT OF BED TONIGHT. DENIES CP/DIZZINESS PRIOR TO. REPORTS \"TRYING TO GET TO THE KITCHEN FOR SOME FOOD\". REPORTS FALLING AND HITTING HEAD. DENIES LOC/BLOOD THINNERS. REPORTS PAIN TO BACK OF HEAD. SWELLING NOTED TO BACK OF HEAD. PRESENTED WITH ABRASIONS TO R LEG 
 
AOX3. SKIN WARM, DRY, PINK. RR EVEN AND UNLABORED

## 2018-09-01 NOTE — ED NOTES
[facility] AT BEDSIDE FOR TRANSPORT BACK TO FACILITY PT LEFT WITH D/C INSTRUCTIONS AND ALL BELONGINGS. ATTEMPTED TO CALL [facility].  NO ANSWER X3

## 2018-09-07 NOTE — DISCHARGE INSTRUCTIONS
We hope that we have addressed all of your medical concerns. The examination and treatment you received in the Emergency Department were for an emergent problem and were not intended as complete care. It is important that you follow up with your healthcare provider(s) for ongoing care. If your symptoms worsen or do not improve as expected, and you are unable to reach your usual health care provider(s), you should return to the Emergency Department. Today's healthcare is undergoing tremendous change, and patient satisfaction surveys are one of the many tools to assess the quality of medical care. You may receive a survey from the [employer] regarding your experience in the Emergency Department. I hope that your experience has been completely positive, particularly the medical care that I provided. As such, please participate in the survey; anything less than excellent does not meet my expectations or intentions. [facility] and [address] [place] [address] participate in nationally recognized quality of care measures. If your blood pressure is greater than 120/80, as reported below, we urge that you seek medical care to address the potential of high blood pressure, commonly known as hypertension. Hypertension can be hereditary or can be caused by certain medical conditions, pain, stress, or \"white coat syndrome. \"       Please make an appointment with your health care provider(s) for follow up of your Emergency Department visit. VITALS:   Patient Vitals for the past 8 hrs:   Temp Pulse Resp BP SpO2   09/07/18 0615 - 72 16 122/62 98 %   09/07/18 0446 97.9 °F (36.6 °C) 63 17 143/58 96 %          Thank you for allowing us to provide you with medical care today. We realize that you have many choices for your emergency care needs. Please choose us in the future for any continued health care needs.       Hilaria Reyes MD    [facility]: [phone]            Recent Results (from the past 24 hour(s))   EKG, 12 LEAD, INITIAL    Collection Time: 09/07/18  5:22 AM   Result Value Ref Range    Ventricular Rate 69 BPM    Atrial Rate 69 BPM    P-R Interval 160 ms    QRS Duration 82 ms    Q-T Interval 400 ms    QTC Calculation (Bezet) 428 ms    Calculated P Axis 37 degrees    Calculated R Axis -35 degrees    Calculated T Axis 22 degrees    Diagnosis       Normal sinus rhythm  Left axis deviation  Abnormal ECG  When compared with ECG of 22-AUG-2018 19:45,  Previous ECG has undetermined rhythm, needs review     CBC WITH AUTOMATED DIFF    Collection Time: 09/07/18  5:29 AM   Result Value Ref Range    WBC 9.2 3.6 - 11.0 K/uL    RBC 4.02 3.80 - 5.20 M/uL    HGB 11.5 11.5 - 16.0 g/dL    HCT 37.6 35.0 - 47.0 %    MCV 93.5 80.0 - 99.0 FL    MCH 28.6 26.0 - 34.0 PG    MCHC 30.6 30.0 - 36.5 g/dL    RDW 14.6 (H) 11.5 - 14.5 %    PLATELET 296 134 - 462 K/uL    MPV 10.0 8.9 - 12.9 FL    NRBC 0.0 0  WBC    ABSOLUTE NRBC 0.00 0.00 - 0.01 K/uL    NEUTROPHILS 53 32 - 75 %    LYMPHOCYTES 18 12 - 49 %    MONOCYTES 12 5 - 13 %    EOSINOPHILS 15 (H) 0 - 7 %    BASOPHILS 1 0 - 1 %    IMMATURE GRANULOCYTES 1 (H) 0.0 - 0.5 %    ABS. NEUTROPHILS 4.8 1.8 - 8.0 K/UL    ABS. LYMPHOCYTES 1.7 0.8 - 3.5 K/UL    ABS. MONOCYTES 1.1 (H) 0.0 - 1.0 K/UL    ABS. EOSINOPHILS 1.4 (H) 0.0 - 0.4 K/UL    ABS. BASOPHILS 0.1 0.0 - 0.1 K/UL    ABS. IMM.  GRANS. 0.1 (H) 0.00 - 0.04 K/UL    DF SMEAR SCANNED      RBC COMMENTS NORMOCYTIC, NORMOCHROMIC     METABOLIC PANEL, BASIC    Collection Time: 09/07/18  5:29 AM   Result Value Ref Range    Sodium 143 136 - 145 mmol/L    Potassium 4.4 3.5 - 5.1 mmol/L    Chloride 106 97 - 108 mmol/L    CO2 31 21 - 32 mmol/L    Anion gap 6 5 - 15 mmol/L    Glucose 86 65 - 100 mg/dL    BUN 19 6 - 20 MG/DL    Creatinine 0.85 0.55 - 1.02 MG/DL    BUN/Creatinine ratio 22 (H) 12 - 20      GFR est AA >60 >60 ml/min/1.73m2    GFR est non-AA >60 >60 ml/min/1.73m2    Calcium 8.8 8.5 - 10.1 MG/DL   URINALYSIS W/MICROSCOPIC    Collection Time: 09/07/18  5:29 AM   Result Value Ref Range    Color YELLOW/STRAW      Appearance CLEAR CLEAR      Specific gravity 1.015 1.003 - 1.030      pH (UA) 6.0 5.0 - 8.0      Protein NEGATIVE  NEG mg/dL    Glucose NEGATIVE  NEG mg/dL    Ketone NEGATIVE  NEG mg/dL    Bilirubin NEGATIVE  NEG      Blood NEGATIVE  NEG      Urobilinogen 0.2 0.2 - 1.0 EU/dL    Nitrites NEGATIVE  NEG      Leukocyte Esterase NEGATIVE  NEG      WBC 0-4 0 - 4 /hpf    RBC 0-5 0 - 5 /hpf    Epithelial cells FEW FEW /lpf    Bacteria NEGATIVE  NEG /hpf    Hyaline cast 0-2 0 - 5 /lpf   URINE CULTURE HOLD SAMPLE    Collection Time: 09/07/18  5:29 AM   Result Value Ref Range    Urine culture hold        URINE ON HOLD IN MICROBIOLOGY DEPT FOR 3 DAYS. IF UNPRESERVED URINE IS SUBMITTED, IT CANNOT BE USED FOR ADDITIONAL TESTING AFTER 24 HRS, RECOLLECTION WILL BE REQUIRED. Ct Head Wo Cont    Result Date: 9/7/2018  EXAM:  CT head without contrast INDICATION: Fall with head injury. COMPARISON: CT 8/22/2018 TECHNIQUE: Axial noncontrast head CT from foramen magnum to vertex. Coronal and sagittal reformatted images were obtained. CT dose reduction was achieved through use of a standardized protocol tailored for this examination and automatic exposure control for dose modulation. FINDINGS:  There is diffuse age-related parenchymal volume loss. The ventricles and sulci are age-appropriate without hydrocephalus. There is no mass effect or midline shift. There is no intracranial hemorrhage or extra-axial fluid collection. Areas of low attenuation in the periventricular white matter represent stable chronic microvascular ischemic changes. The gray-white matter differentiation is maintained. The basal cisterns are patent. The osseous structures are intact. There are posterior extracalvarial soft tissue hematomas, right greater than left.  The visualized paranasal sinuses and mastoid air cells are clear. IMPRESSION: 1. There is no acute intracranial abnormality. 2. There are posterior extracalvarial soft tissue hematomas, right greater than left. Xr Chest Port    Result Date: 9/7/2018  EXAM:  XR CHEST PORT INDICATION:  Fall. COMPARISON: 8/22/2018 TECHNIQUE: Portable AP semiupright chest view at 0558 hours FINDINGS: Cardiac monitoring wires overlie the thorax. The cardiomediastinal contours are stable. The pulmonary vasculature is within normal limits. The lungs and pleural spaces are clear. There is no pneumothorax. The bones and upper abdomen are stable. IMPRESSION: No acute process. Stable exam.          Learning About a Closed Head Injury  What is a closed head injury? A closed head injury happens when your head gets hit hard. The strong force of the blow causes your brain to shake in your skull. This movement can cause the brain to bruise, swell, or tear. Sometimes nerves or blood vessels also get damaged. This can cause bleeding in or around the brain. A concussion is a type of closed head injury. What are the symptoms? If you have a mild concussion, you may have a mild headache or feel \"not quite right. \" These symptoms are common. They usually go away over a few days to 4 weeks. But sometimes after a concussion, you feel like you can't function as well as before the injury. And you have new symptoms. This is called postconcussive syndrome. You may:  · Find it harder to solve problems, think, concentrate, or remember. · Have headaches. · Have changes in your sleep patterns, such as not being able to sleep or sleeping all the time. · Have changes in your personality. · Not be interested in your usual activities. · Feel angry or anxious without a clear reason. · Lose your sense of taste or smell. · Be dizzy, lightheaded, or unsteady. It may be hard to stand or walk. How is a closed head injury treated?   Any person who may have a concussion needs to see a doctor. Some people have to stay in the hospital to be watched. Others can go home safely. If you go home, follow your doctor's instructions. He or she will tell you if you need someone to watch you closely for the next 24 hours or longer. Rest is the best treatment. Get plenty of sleep at night. And try to rest during the day. · Avoid activities that are physically or mentally demanding. These include housework, exercise, and schoolwork. And don't play video games, send text messages, or use the computer. You may need to change your school or work schedule to be able to avoid these activities. · Ask your doctor when it's okay to drive, ride a bike, or operate machinery. · Take an over-the-counter pain medicine, such as acetaminophen (Tylenol), ibuprofen (Advil, Motrin), or naproxen (Aleve). Be safe with medicines. Read and follow all instructions on the label. · Check with your doctor before you use any other medicines for pain. · Do not drink alcohol or use illegal drugs. They can slow recovery. They can also increase your risk of getting a second head injury. Follow-up care is a key part of your treatment and safety. Be sure to make and go to all appointments, and call your doctor if you are having problems. It's also a good idea to know your test results and keep a list of the medicines you take. Where can you learn more? Go to http://suzanne-abdi.info/. Enter E235 in the search box to learn more about \"Learning About a Closed Head Injury. \"  Current as of: October 9, 2017  Content Version: 11.7  © 4393-5994 Healthwise, Incorporated. Care instructions adapted under license by [employer] (which disclaims liability or warranty for this information).  If you have questions about a medical condition or this instruction, always ask your healthcare professional. [place] [address] any warranty or liability for your use of this information. Wheezing or Bronchoconstriction: Care Instructions  Your Care Instructions  Wheezing is a whistling noise made during breathing. It occurs when the small airways, or bronchial tubes, that lead to your lungs swell or contract (spasm) and become narrow. This narrowing is called bronchoconstriction. When your airways constrict, it is hard for air to pass through and this makes it hard for you to breathe. Wheezing and bronchoconstriction can be caused by many problems, including:  · An infection such as the flu or a cold. · Allergies such as hay fever. · Diseases such as asthma or chronic obstructive pulmonary disease. · Smoking. Treatment for your wheezing depends on what is causing the problem. Your wheezing may get better without treatment. But you may need to pay attention to things that cause your wheezing and avoid them. Or you may need medicine to help treat the wheezing and to reduce the swelling or to relieve spasms in your lungs. Follow-up care is a key part of your treatment and safety. Be sure to make and go to all appointments, and call your doctor if you are having problems. It is also a good idea to know your test results and keep a list of the medicines you take. How can you care for yourself at home? · Take your medicine exactly as prescribed. Call your doctor if you think you are having a problem with your medicine. You will get more details on the specific medicine your doctor prescribes. · If your doctor prescribed antibiotics, take them as directed. Do not stop taking them just because you feel better. You need to take the full course of antibiotics. · Breathe moist air from a humidifier, hot shower, or sink filled with hot water. This may help ease your symptoms and make it easier for you to breathe. · If you have congestion in your nose and throat, drinking plenty of fluids, especially hot fluids, may help relieve your symptoms.  If you have kidney, heart, or liver disease and have to limit fluids, talk with your doctor before you increase the amount of fluids you drink. · If you have mucus in your airways, it may help to breathe deeply and cough. · Do not smoke or allow others to smoke around you. Smoking can make your wheezing worse. If you need help quitting, talk to your doctor about stop-smoking programs and medicines. These can increase your chances of quitting for good. · Avoid things that may cause your wheezing. These may include colds, smoke, air pollution, dust, pollen, pets, cockroaches, stress, and cold air. When should you call for help? Call 911 anytime you think you may need emergency care. For example, call if:    · You have severe trouble breathing.     · You passed out (lost consciousness).    Call your doctor now or seek immediate medical care if:    · You cough up yellow, dark brown, or bloody mucus (sputum).     · You have new or worse shortness of breath.     · Your wheezing is not getting better or it gets worse after you start taking your medicine.    Watch closely for changes in your health, and be sure to contact your doctor if:    · You do not get better as expected. Where can you learn more? Go to http://suzanne-abdi.info/. Enter 454 8194 in the search box to learn more about \"Wheezing or Bronchoconstriction: Care Instructions. \"  Current as of: December 6, 2017  Content Version: 11.7  © 9608-6829 Healthwise, Incorporated. Care instructions adapted under license by [employer] (which disclaims liability or warranty for this information). If you have questions about a medical condition or this instruction, always ask your healthcare professional. [address] any warranty or liability for your use of this information.

## 2018-09-07 NOTE — ED TRIAGE NOTES
Pt. [place] in by EMS from [facility] ([facility]). Pt. Reports was walking with walker and unsure of how she fell, she was found by staff on the floor. Pt. Unsure if LOC, denies CP.

## 2018-09-07 NOTE — ED PROVIDER NOTES
HPI Comments: 81 yo WF with hx dementia, depression, cancer presents with c/o fall from [facility]. Per ems pt was found on the floor by staff and c/o some pain to the back of her head this morning. Pt is unsure how she ended up on the floor but is unsure. She thinks she may have been walking with her walker and fallen. C/o head pain. Denies cp or sob. Denies, back pain. Hx limited as pt is a poor historian Per chart review- pt was seen in ED 8/22 for a fall and had negative head, c spine ct, labs and urine at that time. Patient is a 80 y.o. female presenting with fall. The history is provided by the patient and the EMS personnel. Fall Past Medical History:  
Diagnosis Date  Arthritis Dr. Juarez Hernadez  Cancer ([facility] [address]   
 colon CA in situ  Dementia  Depression  Hematoma (nontraumatic) of breast   
 Dr. Humberto freeman breast surgeon  High cholesterol  Hypertension  Osteoporosis Dr. Maia Jung fall fractured vertebrae  Psychiatric disorder   
 depression  Sebaceous carcinoma 2018  Umbilical hernia Past Surgical History:  
Procedure Laterality Date [address] [facility],[address]  HX HEENT    
 cateracts bilateral  
 HX HIP REPLACEMENT  2009 Right  HX ORTHOPAEDIC  2012 T11 & L1 vertebrae fx  HX PELVIC FRACTURE [place]  2010 Family History:  
Problem Relation Age of Onset  Heart Disease Mother Social History Social History  Marital status:  Spouse name: N/A  
 Number of children: N/A  
 Years of education: N/A Occupational History  Not on file. Social History Main Topics  Smoking status: Former Smoker Packs/day: 0.50 Quit date: 1/1/1970  Smokeless tobacco: Never Used  Alcohol use Yes Comment: occasional  
 Drug use: No  
 Sexual activity: No  
 
Other Topics Concern  Not on file Social History Narrative Solomon Sosa, Independent Living Can cook if she wants 3 children 2 daughters and 1 son No smoke Drink- wine, or bourbon or vodka 1 ounce ALLERGIES: Latex; Codeine; Neosporin [benzalkonium chloride]; and Sulfa dyne Review of Systems Unable to perform ROS: Dementia Respiratory: Negative for shortness of breath. Cardiovascular: Negative for chest pain. Vitals:  
 09/07/18 0446 BP: 143/58 Pulse: 63 Resp: 17 Temp: 97.9 °F (36.6 °C) SpO2: 96% Weight: 74.8 kg (165 lb) Height: 5' 6\" (1.676 m) Physical Exam  
Constitutional: She appears well-developed and well-nourished. No distress. HENT:  
Head: Normocephalic. Hematoma to posterior scalp Sutures to nasal bridge- no surrounding erythema or drainage Eyes: Conjunctivae and EOM are normal. Pupils are equal, round, and reactive to light. Neck: Normal range of motion. Neck supple. Cardiovascular: Normal rate, regular rhythm, normal heart sounds and intact distal pulses. Exam reveals no friction rub. No murmur heard. Pulmonary/Chest: Effort normal. No respiratory distress. She has wheezes. She has no rales. She exhibits no tenderness. Scant wheezes b/l Abdominal: Soft. Bowel sounds are normal. She exhibits no distension. There is no tenderness. There is no rebound and no guarding. Musculoskeletal: Normal range of motion. She exhibits no edema or tenderness. Right lateral calf with well healing 3 cm laceration sutures intact (placed on 9/1) Neurological: She is alert. No cranial nerve deficit. Coordination normal.  
Skin: Skin is warm and dry. She is not diaphoretic. No pallor. Psychiatric: She has a normal mood and affect. Her behavior is normal.  
Nursing note and vitals reviewed. MDM Number of Diagnoses or Management Options Injury of head, initial encounter:  
Wheezing:  
Diagnosis management comments: Ct head for ich given fall Pt does not remember why she fell and while most likely mechanical will check urine, electrolytes, hb, ekg 
 
cxr- given Amount and/or Complexity of Data Reviewed Clinical lab tests: ordered and reviewed Tests in the radiology section of CPT®: ordered and reviewed Obtain history from someone other than the patient: yes (daughter) Independent visualization of images, tracings, or specimens: yes (ekg) Patient Progress Patient progress: stable ED Course Procedures ED EKG interpretation: 
Rhythm: normal sinus rhythm; and regular . Rate (approx.): 70; Axis: normal; P wave: normal; QRS interval: normal ; ST/T wave: non-specific changes EKG documented by Ifeanyi Hernandez MD, scribe, as interpreted by Ifeanyi Hernandez MD, ED MD. 
 
6:38 AM 
Spoke with pts daughter at length. She states pt had sebaceous cyst removed from her face on Wednesday and she thinks the fall was effects of anesthesia. She states pt normally is in a wheelchair but occasionally uses a walker. Pt also had another fall on 9/1 and has a second chart that needs to be merged. I discussed this is likely going to continue. Daughter is concerned about finances as she is trying to make the money last to care for her mother. RN called [facility] and there is no higher level of care. I will put in case management consult. Daughter needs to  her grandchild but can be reached at [phone] [facility]. 7:02 AM 
Daughter wants to take the patient back to [facility]. Offered to have pt wait for social work and to provide transport back. Daughter declined. Discussed risk of delayed ICH with daughter and reasons to return. Case management consult left for oncoming  today 
 
7:03 AM 
Patient's results have been reviewed with them.   Patient and/or family have verbally conveyed their understanding and agreement of the patient's signs, symptoms, diagnosis, treatment and prognosis and additionally agree to follow up as recommended or return to the Emergency Room should their condition change prior to follow-up. Discharge instructions have also been provided to the patient with some educational information regarding their diagnosis as well a list of reasons why they would want to return to the ER prior to their follow-up appointment should their condition change.

## 2018-09-07 NOTE — ED NOTES
Ambulated patient in the room with this nurse and PCT with walker unsteady gait baseline for patient per daughter. Patient discharged by provider, VSS. Patient taken out in wheelchair and assisted into private vehicle.

## 2018-09-07 NOTE — PROGRESS NOTES
09/07/18 Patient was in the ED today and discharged before CM arrived at work. Note requested CM to contact daughter to provide information on caretaker/supervision for patient. TC to patient's daughter, Nuno Green, [phone]. She stated that the patient has been living at [facility] for the past 3 years. She is able to get around with a walker but has several falls upon transferring. Daughter wants to keep her at [address] [facility] [address] so is talking to staff about options and getting more help vs having to hire someone from the outside. Daughter is waiting to hear back from them. She stated she will call back CM if needs any more information. Gertrudis Fernandez, MSW

## 2018-09-07 NOTE — ED NOTES
Called Analy Nguyen and spoke to staff regarding patient's falls, this nurse asked about the patient having a bed alarm and/or possibly moving to a higher level of care. This nurse was informed that neither of those options are available, MD informed. Awaiting Case Management

## 2018-09-12 NOTE — TELEPHONE ENCOUNTER
Called by [facility] nurse on call. Family notes that suture to RLE noted to be mildly red and slightly more painful. No significant swelling. No discharge. No systemic s/s of infection. Suggest that they apply topical abx to the area. Monitor for s/s of systemic infection and can be seen in office if needed tomorrow. Discussed reasons for urgent evaluation. [place] do you mind reaching out to the family to see if appt needed.

## 2018-09-13 NOTE — TELEPHONE ENCOUNTER
I spoke with patient's daughter, she states they are concerned about possible cellulitis. Area is red, warm and swollen. No Fever. I asked if daughter can bring pt to the office today at 3pm, she is going to make sure they will have transportation and call our office back.

## 2018-09-13 NOTE — PROGRESS NOTES
Chief Complaint Patient presents with  Leg Pain Patient presents today with her daughter for inflammation around suture the left leg. Cellulitis Nanda Acostaing notes that patient has developed increased redness and swelling around suture site 7 days ago. Patient fell and was taken to the ER and also had incision sutured. No fever, patient does not complain of pain. Daughter does note that the area is much more red than other side. Denies using a fluid. Eczema Patient has biopsy-proven eczema diagnosed by Dr. Leidy Salinas. She has been getting steroid cream on her back twice a day as directed. Daughter notes that patient is still itching and the expensive creams are not working. She did use a Medrol Dosepak which helped her symptoms in the past.  Patient reports she is very uncomfortable with itching. Subjective:  
Abid Carlos is a 80 y.o. female with hypertension. Hypertension ROS: taking medications as instructed, no medication side effects noted, no TIA's, no chest pain on exertion, no dyspnea on exertion, no swelling of ankles. New concerns: none. Wheelchair assessment Patient had not had any more falls out of her wheelchair. Her wheelchair assessments were done and signed off. Past Medical History:  
Diagnosis Date  Arthritis Dr. Dodie Crigler  Cancer ([facility] [address]   
 colon CA in situ  Dementia  Depression  Hematoma (nontraumatic) of breast   
 Dr. Amber freeman breast surgeon  High cholesterol  Hypertension  Osteoporosis Dr. Yesica Cruz fall fractured vertebrae  Psychiatric disorder   
 depression  Sebaceous carcinoma 2018  Umbilical hernia Past Surgical History:  
Procedure Laterality Date [address] [facility],[address]  HX HEENT    
 cateracts bilateral  
 HX HIP REPLACEMENT  2009 Right  HX ORTHOPAEDIC  2012 T11 & L1 vertebrae fx  HX PELVIC FRACTURE TX  2010 Social History Social History  Marital status:  Spouse name: N/A  
 Number of children: N/A  
 Years of education: N/A Social History Main Topics  Smoking status: Former Smoker Packs/day: 0.50 Quit date: 1/1/1970  Smokeless tobacco: Never Used  Alcohol use Yes Comment: occasional  
 Drug use: No  
 Sexual activity: No  
 
Other Topics Concern  None Social History Narrative Ly Tang, Independent Living Can cook if she wants 3 children 2 daughters and 1 son No smoke Drink- wine, or bourbon or vodka 1 ounce Family History Problem Relation Age of Onset  Heart Disease Mother Current Outpatient Prescriptions Medication Sig Dispense Refill  azithromycin (ZITHROMAX) 250 mg tablet Take 1 Tab by mouth See Admin Instructions for 5 days. Do not give with lexapro 6 Tab 0  
 fluocinoNIDE (LIDEX) 0.05 % ointment Apply  to affected area two (2) times a day.  montelukast (SINGULAIR) 10 mg tablet Take 1 Tab by mouth daily. 30 Tab 1  
 acetaminophen (TYLENOL EXTRA STRENGTH) 500 mg tablet Take 2 Tabs by mouth every six (6) hours as needed for Pain. 30 Tab 0  
 denosumab (PROLIA) 60 mg/mL injection 60 mg by SubCUTAneous route.  guaiFENesin ER (MUCINEX) 600 mg ER tablet Take 1 Tab by mouth two (2) times a day. Follow with 6 oz of water 60 Tab 1  
 diphenhydrAMINE (BENADRYL ALLERGY) 12.5 mg/5 mL syrup Take 10 mL by mouth nightly as needed. Indications: Urticaria 236 mL 0  
 triamcinolone acetonide (KENALOG) 0.1 % topical cream Apply  to affected area two (2) times a day. use thin layer 45 g 0  
 albuterol (PROVENTIL VENTOLIN) 2.5 mg /3 mL (0.083 %) nebulizer solution 3 mL by Nebulization route two (2) times a day. 24 Each 3  
 traMADol (ULTRAM) 50 mg tablet Take 1 Tab by mouth every six (6) hours as needed for Pain. Max Daily Amount: 200 mg. 30 Tab 0  
 escitalopram oxalate (LEXAPRO) 10 mg tablet Take 1 Tab by mouth daily.  90 Tab 1  
  Cholecalciferol, Vitamin D3, 1,000 unit cap Take 1 capsule by mouth daily 90 Cap 1  
 ondansetron (ZOFRAN ODT) 8 mg disintegrating tablet Take 1 Tab by mouth every eight (8) hours as needed for Nausea. 15 Tab 0  
 fluticasone (FLONASE) 50 mcg/actuation nasal spray 2 Sprays by Both Nostrils route daily. 1 Bottle 3  therapeutic multivitamin (THERAGRAN) tablet Take 1 Tab by mouth daily.  bismuth subsalicylate (BISMATROL) 262 mg/15 mL suspension Take 30 mL by mouth as needed (After each loose stool).  albuterol (PROVENTIL, VENTOLIN) 2 mg/5 mL syrup Take 5 mL by mouth four (4) times daily as needed. Please give pt if she is coughing. If not improving need rx and nebulizer. 473 mL 2 Allergies Allergen Reactions  Latex Unknown (comments) Pt unable to report  Codeine Unknown (comments)  Neosporin [Benzalkonium Chloride] Rash  Sulfa Dyne Other (comments) \"Crying jag\" Review of Systems - General ROS: negative for - chills, fatigue, fever, hot flashes, malaise or night sweats Cardiovascular ROS: no chest pain or dyspnea on exertion Respiratory ROS: no cough, shortness of breath, or wheezing Visit Vitals  /74 (BP 1 Location: Left arm, BP Patient Position: Sitting)  Pulse 79  Temp 98.2 °F (36.8 °C) (Oral)  Resp 18  Ht 5' 6\" (1.676 m)  Wt 165 lb (74.8 kg)  SpO2 96%  BMI 26.63 kg/m2 General Appearance:  Well developed, well nourished,alert and oriented x 3, and individual in no acute distress. Patient is sitting in wheelchair but pelvis is forward and thorax is slumped into backrest.  She appears to be sliding out of the we wheelchair. Ears/Nose/Mouth/Throat:   Hearing grossly normal. 
Right scalp hematoma pain on palpation no cranial depression on palpation Neck: Supple, no lad, no bruits Chest:   Lungs clear to auscultation bilaterally. Cardiovascular:  Regular rate and rhythm, S1, S2 normal, no murmur. Abdomen:   Soft, non-tender, bowel sounds are active. Extremities: No edema bilaterally. 2 inch incision sutured there is dry scab along closure, no drainage surrounding area with mild inflammation and tenderness to palpation Skin: Warm and dry, no suspicious lesions, diffuse maculopapular rash on back left greater than right, hyperpigmented rash at left neck Diagnoses and all orders for this visit: 1. Eczema, unspecified type Symptoms do not appear to be improving with steroid creams. I did speak with Dr. Tone Valladares about using steroids. Since patient is not improving may try very low-dose oral steroids to see if this will give her some relief for quality of life. Andre Ip, daughter would like for patient to have steroids called in after infection from like heals. She will call the clinic and I can call in low dose to her family care pharmacy. 2. Cellulitis of other specified site Mild cellulitis 
 
-     azithromycin (ZITHROMAX) 250 mg tablet; Take 1 Tab by mouth See Admin Instructions for 5 days. Do not give with lexapro 3. Essential hypertension Continue meds This note will not be viewable in [address]

## 2018-09-13 NOTE — MR AVS SNAPSHOT
[facility] [place] 
 
 
 [address] Kristyn Velez [address] [facility] 
[phone] Patient: Elba Sandoval MRN: [id number] Visit Information Date & Time Provider Department Dept. Phone Encounter #  
 2018  3:00 PM Vikash Davenport MD [facility] [address] [id number] Upcoming Health Maintenance Date Due ZOSTER VACCINE AGE 60> 1984 GLAUCOMA SCREENING Q2Y 1989 Pneumococcal 65+ High/Highest Risk (2 of 2 - PPSV23) 2017 Influenza Age 5 to Adult 2018 MEDICARE YEARLY EXAM 10/20/2018 DTaP/Tdap/Td series (2 - Td) 2024 Allergies as of 2018  Review Complete On: 2018 By: Cordell Kerns LPN Severity Noted Reaction Type Reactions Latex  2012    Unknown (comments) Pt unable to report Codeine  2012    Unknown (comments) Neosporin [Benzalkonium Chloride]  2017    Rash  
 Sulfa Dyne  2012    Other (comments) \"Crying jag\" Current Immunizations  Reviewed on 10/5/2017 Name Date Influenza High Dose Vaccine PF 10/5/2017, 10/4/2016 Influenza Vaccine 10/1/2014, 2012 Pneumococcal Vaccine (Unspecified Type) 2012 Tdap 2014  2:44 AM  
  
 Not reviewed this visit You Were Diagnosed With   
  
 Codes Comments Rash    -  Primary ICD-10-CM: R21 
ICD-9-CM: 782.1 Cellulitis of other specified site     ICD-10-CM: L03.818 ICD-9-CM: 682.8 Vitals BP Pulse Temp Resp Height(growth percentile) Weight(growth percentile) 123/74 (BP 1 Location: Left arm, BP Patient Position: Sitting) 79 98.2 °F (36.8 °C) (Oral) 18 5' 6\" (1.676 m) 165 lb (74.8 kg) SpO2 BMI OB Status Smoking Status 96% 26.63 kg/m2 Postmenopausal Former Smoker BMI and BSA Data Body Mass Index Body Surface Area  
 26.63 kg/m 2 1.87 m 2 Preferred Pharmacy Pharmacy Name Phone [address] [place] [address] [phone] Your Updated Medication List  
  
   
This list is accurate as of 9/13/18  4:25 PM.  Always use your most recent med list.  
  
  
  
  
 acetaminophen 500 mg tablet Commonly known as:  Jr Aly Hoover Se Take 2 Tabs by mouth every six (6) hours as needed for Pain. * albuterol 2 mg/5 mL syrup Commonly known as:  Lena Buttner Take 5 mL by mouth four (4) times daily as needed. Please give pt if she is coughing. If not improving need rx and nebulizer. * albuterol 2.5 mg /3 mL (0.083 %) nebulizer solution Commonly known as:  PROVENTIL VENTOLIN  
3 mL by Nebulization route two (2) times a day. azithromycin 250 mg tablet Commonly known as:  Maura Brain Take 1 Tab by mouth See Admin Instructions for 5 days. Do not give with lexapro BISMATROL 262 mg/15 mL suspension Generic drug:  bismuth subsalicylate Take 30 mL by mouth as needed (After each loose stool). cholecalciferol 1,000 unit Cap Commonly known as:  VITAMIN D3 Take 1 capsule by mouth daily diphenhydrAMINE 12.5 mg/5 mL syrup Commonly known as:  BENADRYL ALLERGY Take 10 mL by mouth nightly as needed. Indications: Urticaria  
  
 escitalopram oxalate 10 mg tablet Commonly known as:  Dane Leriche Take 1 Tab by mouth daily. fluocinoNIDE 0.05 % ointment Commonly known as:  LIDEX Apply  to affected area two (2) times a day. fluticasone 50 mcg/actuation nasal spray Commonly known as:  Nurys Greenlee 2 Sprays by Both Nostrils route daily. guaiFENesin  mg ER tablet Commonly known as:  Samy & Samy Take 1 Tab by mouth two (2) times a day. Follow with 6 oz of water  
  
 montelukast 10 mg tablet Commonly known as:  SINGULAIR Take 1 Tab by mouth daily. ondansetron 8 mg disintegrating tablet Commonly known as:  ZOFRAN ODT Take 1 Tab by mouth every eight (8) hours as needed for Nausea. PROLIA 60 mg/mL injection Generic drug:  denosumab 60 mg by SubCUTAneous route. therapeutic multivitamin tablet Commonly known as:  [facility] Take 1 Tab by mouth daily. traMADol 50 mg tablet Commonly known as:  ULTRAM  
Take 1 Tab by mouth every six (6) hours as needed for Pain. Max Daily Amount: 200 mg.  
  
 triamcinolone acetonide 0.1 % topical cream  
Commonly known as:  KENALOG Apply  to affected area two (2) times a day. use thin layer * Notice: This list has 2 medication(s) that are the same as other medications prescribed for you. Read the directions carefully, and ask your doctor or other care provider to review them with you. Prescriptions Sent to Pharmacy Refills  
 azithromycin (ZITHROMAX) 250 mg tablet 0 Sig: Take 1 Tab by mouth See Admin Instructions for 5 days. Do not give with lexapro Class: Normal  
 Pharmacy: [address] [place] [address] Ph #: [phone] Route: Oral  
  
Introducing \[facility]\"" & HEALTH SERVICES! Dear Uri Prey: Thank you for requesting a buySAFE account. Our records indicate that you already have an active buySAFE account. You can access your account anytime at [email or web address] Did you know that you can access your hospital and ER discharge instructions at any time in buySAFE? You can also review all of your test results from your hospital stay or ER visit. Additional Information If you have questions, please visit the Frequently Asked Questions section of the buySAFE website at [email or web address] Remember, buySAFE is NOT to be used for urgent needs. For medical emergencies, dial 911. Now available from your iPhone and Android! Please provide this summary of care documentation to your next provider. Your primary care clinician is listed as Anita Howard.  If you have any questions after today's visit, please call [phone].

## 2018-09-18 NOTE — TELEPHONE ENCOUNTER
Return call to Murtaza Barron with [facility] [address], she did not answer, left v/m per PCP, ok to continue.

## 2018-09-18 NOTE — TELEPHONE ENCOUNTER
Humphrey Albrecht 127  Needs to know if they can keep on with order for wound care using Bacterium for 3 times a week?   Call sarahi at [phone]

## 2018-09-24 NOTE — TELEPHONE ENCOUNTER
Discussed case with Dr. Abrahan Reyez given QOL, ok to write so sent in. pleae let daughter know sent in.

## 2018-09-24 NOTE — TELEPHONE ENCOUNTER
Pt daughter call in regarding the prednisone for eczema that Dr Reginaldo Durham was John Garnica send in once her leg had healed. The stitches have been removed and is healed. So please send in low dose prednisone. Verified pharmacy on file.  Etta Delhi (daughter and [place]) # [phone]

## 2018-11-05 NOTE — TELEPHONE ENCOUNTER
Chandan Howard / [facility] is requesting that a nurse call her regarding a fax for a wheel chair be filled out by DR. Chet Valdez and sent to them. Stated they had faxed 2 times to us. Please call Chandan Howard at [phone] Ext.  [phone]

## 2018-11-06 NOTE — TELEPHONE ENCOUNTER
I called Francia back, no answer. LM on personal VM to please refax a form to nurses fax for PCP to sign for a wheel chair. Call back for questions/concerns.

## 2019-01-01 ENCOUNTER — APPOINTMENT (OUTPATIENT)
Dept: GENERAL RADIOLOGY | Age: 84
End: 2019-01-01
Attending: EMERGENCY MEDICINE
Payer: MEDICARE

## 2019-01-01 ENCOUNTER — APPOINTMENT (OUTPATIENT)
Dept: MRI IMAGING | Age: 84
DRG: 308 | End: 2019-01-01
Attending: NURSE PRACTITIONER
Payer: MEDICARE

## 2019-01-01 ENCOUNTER — HOSPICE ADMISSION (OUTPATIENT)
Dept: HOSPICE | Facility: HOSPICE | Age: 84
End: 2019-01-01
Payer: MEDICARE

## 2019-01-01 ENCOUNTER — APPOINTMENT (OUTPATIENT)
Dept: NON INVASIVE DIAGNOSTICS | Age: 84
DRG: 308 | End: 2019-01-01
Attending: INTERNAL MEDICINE
Payer: MEDICARE

## 2019-01-01 ENCOUNTER — PATIENT OUTREACH (OUTPATIENT)
Dept: INTERNAL MEDICINE CLINIC | Age: 84
End: 2019-01-01

## 2019-01-01 ENCOUNTER — HOSPITAL ENCOUNTER (EMERGENCY)
Age: 84
Discharge: HOME | End: 2019-01-18
Attending: EMERGENCY MEDICINE
Payer: MEDICARE

## 2019-01-01 ENCOUNTER — APPOINTMENT (OUTPATIENT)
Dept: CT IMAGING | Age: 84
DRG: 308 | End: 2019-01-01
Attending: EMERGENCY MEDICINE
Payer: MEDICARE

## 2019-01-01 ENCOUNTER — HOSPITAL ENCOUNTER (INPATIENT)
Age: 84
LOS: 4 days | End: 2019-06-06
Attending: INTERNAL MEDICINE | Admitting: INTERNAL MEDICINE
Payer: MEDICARE

## 2019-01-01 ENCOUNTER — APPOINTMENT (OUTPATIENT)
Dept: GENERAL RADIOLOGY | Age: 84
DRG: 308 | End: 2019-01-01
Attending: EMERGENCY MEDICINE
Payer: MEDICARE

## 2019-01-01 ENCOUNTER — HOSPITAL ENCOUNTER (INPATIENT)
Age: 84
LOS: 3 days | Discharge: HOSPICE | DRG: 308 | End: 2019-06-02
Attending: EMERGENCY MEDICINE | Admitting: INTERNAL MEDICINE
Payer: MEDICARE

## 2019-01-01 VITALS
OXYGEN SATURATION: 93 % | RESPIRATION RATE: 20 BRPM | HEART RATE: 88 BPM | TEMPERATURE: 97.6 F | DIASTOLIC BLOOD PRESSURE: 96 MMHG | SYSTOLIC BLOOD PRESSURE: 136 MMHG

## 2019-01-01 VITALS
RESPIRATION RATE: 22 BRPM | SYSTOLIC BLOOD PRESSURE: 114 MMHG | HEART RATE: 82 BPM | TEMPERATURE: 97.5 F | WEIGHT: 177.7 LBS | DIASTOLIC BLOOD PRESSURE: 41 MMHG | HEIGHT: 64 IN | BODY MASS INDEX: 30.34 KG/M2 | OXYGEN SATURATION: 96 %

## 2019-01-01 VITALS
OXYGEN SATURATION: 79 % | HEART RATE: 61 BPM | DIASTOLIC BLOOD PRESSURE: 42 MMHG | RESPIRATION RATE: 20 BRPM | TEMPERATURE: 101.5 F | SYSTOLIC BLOOD PRESSURE: 90 MMHG

## 2019-01-01 DIAGNOSIS — L03.115 CELLULITIS OF RIGHT LOWER EXTREMITY: ICD-10-CM

## 2019-01-01 DIAGNOSIS — F03.91 DEMENTIA WITH BEHAVIORAL DISTURBANCE, UNSPECIFIED DEMENTIA TYPE: ICD-10-CM

## 2019-01-01 DIAGNOSIS — R45.1 AGITATION: ICD-10-CM

## 2019-01-01 DIAGNOSIS — R41.82 ALTERED MENTAL STATUS, UNSPECIFIED ALTERED MENTAL STATUS TYPE: ICD-10-CM

## 2019-01-01 DIAGNOSIS — I48.91 ATRIAL FIBRILLATION WITH RVR (HCC): Primary | ICD-10-CM

## 2019-01-01 DIAGNOSIS — F05 DELIRIUM DUE TO ANOTHER MEDICAL CONDITION: ICD-10-CM

## 2019-01-01 DIAGNOSIS — G31.1 SENILE DEGENERATION OF BRAIN (HCC): ICD-10-CM

## 2019-01-01 DIAGNOSIS — R50.81 FEVER IN OTHER DISEASES: ICD-10-CM

## 2019-01-01 DIAGNOSIS — R41.0 CONFUSION: ICD-10-CM

## 2019-01-01 DIAGNOSIS — R06.4 LABORED BREATHING: ICD-10-CM

## 2019-01-01 DIAGNOSIS — K11.7 INCREASED OROPHARYNGEAL SECRETIONS: ICD-10-CM

## 2019-01-01 DIAGNOSIS — R41.0 CONFUSION: Primary | ICD-10-CM

## 2019-01-01 LAB
ALBUMIN SERPL-MCNC: 2.9 G/DL (ref 3.5–5)
ALBUMIN SERPL-MCNC: 3.2 G/DL (ref 3.5–5)
ALBUMIN/GLOB SERPL: 0.9 {RATIO} (ref 1.1–2.2)
ALBUMIN/GLOB SERPL: 0.9 {RATIO} (ref 1.1–2.2)
ALP SERPL-CCNC: 62 U/L (ref 45–117)
ALP SERPL-CCNC: 66 U/L (ref 45–117)
ALT SERPL-CCNC: 22 U/L (ref 12–78)
ALT SERPL-CCNC: 22 U/L (ref 12–78)
AMORPH CRY URNS QL MICRO: ABNORMAL
ANION GAP SERPL CALC-SCNC: 3 MMOL/L (ref 5–15)
ANION GAP SERPL CALC-SCNC: 7 MMOL/L (ref 5–15)
ANION GAP SERPL CALC-SCNC: 8 MMOL/L (ref 5–15)
APPEARANCE UR: ABNORMAL
APPEARANCE UR: ABNORMAL
APPEARANCE UR: CLEAR
AST SERPL-CCNC: 16 U/L (ref 15–37)
AST SERPL-CCNC: 17 U/L (ref 15–37)
ATRIAL RATE: 103 BPM
ATRIAL RATE: 108 BPM
ATRIAL RATE: 113 BPM
ATRIAL RATE: 83 BPM
BACTERIA SPEC CULT: ABNORMAL
BACTERIA SPEC CULT: NORMAL
BACTERIA URNS QL MICRO: ABNORMAL /HPF
BACTERIA URNS QL MICRO: NEGATIVE /HPF
BACTERIA URNS QL MICRO: NEGATIVE /HPF
BASOPHILS # BLD: 0.1 K/UL (ref 0–0.1)
BASOPHILS NFR BLD: 1 % (ref 0–1)
BILIRUB SERPL-MCNC: 0.4 MG/DL (ref 0.2–1)
BILIRUB SERPL-MCNC: 0.4 MG/DL (ref 0.2–1)
BILIRUB UR QL CFM: NEGATIVE
BILIRUB UR QL CFM: NEGATIVE
BILIRUB UR QL: NEGATIVE
BUN SERPL-MCNC: 19 MG/DL (ref 6–20)
BUN SERPL-MCNC: 21 MG/DL (ref 6–20)
BUN SERPL-MCNC: 30 MG/DL (ref 6–20)
BUN/CREAT SERPL: 22 (ref 12–20)
BUN/CREAT SERPL: 24 (ref 12–20)
BUN/CREAT SERPL: 30 (ref 12–20)
CALCIUM SERPL-MCNC: 8.3 MG/DL (ref 8.5–10.1)
CALCIUM SERPL-MCNC: 8.6 MG/DL (ref 8.5–10.1)
CALCIUM SERPL-MCNC: 9.1 MG/DL (ref 8.5–10.1)
CALCULATED R AXIS, ECG10: -44 DEGREES
CALCULATED R AXIS, ECG10: -52 DEGREES
CALCULATED R AXIS, ECG10: -56 DEGREES
CALCULATED R AXIS, ECG10: -57 DEGREES
CALCULATED T AXIS, ECG11: -5 DEGREES
CALCULATED T AXIS, ECG11: 16 DEGREES
CALCULATED T AXIS, ECG11: 44 DEGREES
CALCULATED T AXIS, ECG11: 45 DEGREES
CC UR VC: ABNORMAL
CC UR VC: NORMAL
CHLORIDE SERPL-SCNC: 102 MMOL/L (ref 97–108)
CHLORIDE SERPL-SCNC: 110 MMOL/L (ref 97–108)
CHLORIDE SERPL-SCNC: 113 MMOL/L (ref 97–108)
CO2 SERPL-SCNC: 26 MMOL/L (ref 21–32)
CO2 SERPL-SCNC: 28 MMOL/L (ref 21–32)
CO2 SERPL-SCNC: 29 MMOL/L (ref 21–32)
COLOR UR: ABNORMAL
COMMENT, HOLDF: NORMAL
CREAT SERPL-MCNC: 0.8 MG/DL (ref 0.55–1.02)
CREAT SERPL-MCNC: 0.97 MG/DL (ref 0.55–1.02)
CREAT SERPL-MCNC: 1.01 MG/DL (ref 0.55–1.02)
DIAGNOSIS, 93000: NORMAL
DIFFERENTIAL METHOD BLD: ABNORMAL
ECHO AO ROOT DIAM: 3.36 CM
ECHO EST RA PRESSURE: 3 MMHG
ECHO LV INTERNAL DIMENSION DIASTOLIC MMODE: 4.29 CM
ECHO LV INTERNAL DIMENSION SYSTOLIC MMODE: 2.51 CM
ECHO LV IVSD MMODE: 1.33 CM
ECHO LV POSTERIOR WALL DIASTOLIC MMODE: 1.4 CM
ECHO LVOT DIAM: 2.07 CM
ECHO PV MAX VELOCITY: 93.06 CM/S
ECHO PV PEAK GRADIENT: 3.5 MMHG
EOSINOPHIL # BLD: 0.5 K/UL (ref 0–0.4)
EOSINOPHIL NFR BLD: 4 % (ref 0–7)
EOSINOPHIL NFR BLD: 5 % (ref 0–7)
EOSINOPHIL NFR BLD: 8 % (ref 0–7)
EPITH CASTS URNS QL MICRO: ABNORMAL /LPF
ERYTHROCYTE [DISTWIDTH] IN BLOOD BY AUTOMATED COUNT: 13.7 % (ref 11.5–14.5)
ERYTHROCYTE [DISTWIDTH] IN BLOOD BY AUTOMATED COUNT: 14.4 % (ref 11.5–14.5)
ERYTHROCYTE [DISTWIDTH] IN BLOOD BY AUTOMATED COUNT: 15 % (ref 11.5–14.5)
GLOBULIN SER CALC-MCNC: 3.3 G/DL (ref 2–4)
GLOBULIN SER CALC-MCNC: 3.6 G/DL (ref 2–4)
GLUCOSE BLD STRIP.AUTO-MCNC: 119 MG/DL (ref 65–100)
GLUCOSE SERPL-MCNC: 108 MG/DL (ref 65–100)
GLUCOSE SERPL-MCNC: 130 MG/DL (ref 65–100)
GLUCOSE SERPL-MCNC: 133 MG/DL (ref 65–100)
GLUCOSE UR STRIP.AUTO-MCNC: NEGATIVE MG/DL
HCT VFR BLD AUTO: 36.3 % (ref 35–47)
HCT VFR BLD AUTO: 42.9 % (ref 35–47)
HCT VFR BLD AUTO: 42.9 % (ref 35–47)
HGB BLD-MCNC: 11.2 G/DL (ref 11.5–16)
HGB BLD-MCNC: 13.3 G/DL (ref 11.5–16)
HGB BLD-MCNC: 13.5 G/DL (ref 11.5–16)
HGB UR QL STRIP: ABNORMAL
HGB UR QL STRIP: NEGATIVE
HGB UR QL STRIP: NEGATIVE
HYALINE CASTS URNS QL MICRO: ABNORMAL /LPF (ref 0–5)
HYALINE CASTS URNS QL MICRO: ABNORMAL /LPF (ref 0–5)
IMM GRANULOCYTES # BLD AUTO: 0.1 K/UL (ref 0–0.04)
IMM GRANULOCYTES # BLD AUTO: 0.1 K/UL (ref 0–0.04)
IMM GRANULOCYTES # BLD AUTO: 0.2 K/UL (ref 0–0.04)
IMM GRANULOCYTES NFR BLD AUTO: 1 % (ref 0–0.5)
IMM GRANULOCYTES NFR BLD AUTO: 1 % (ref 0–0.5)
IMM GRANULOCYTES NFR BLD AUTO: 2 % (ref 0–0.5)
KETONES UR QL STRIP.AUTO: 15 MG/DL
KETONES UR QL STRIP.AUTO: ABNORMAL MG/DL
KETONES UR QL STRIP.AUTO: ABNORMAL MG/DL
LACTATE BLD-SCNC: 1.84 MMOL/L (ref 0.4–2)
LEUKOCYTE ESTERASE UR QL STRIP.AUTO: ABNORMAL
LEUKOCYTE ESTERASE UR QL STRIP.AUTO: NEGATIVE
LEUKOCYTE ESTERASE UR QL STRIP.AUTO: NEGATIVE
LYMPHOCYTES # BLD: 0.9 K/UL (ref 0.8–3.5)
LYMPHOCYTES # BLD: 1.2 K/UL (ref 0.8–3.5)
LYMPHOCYTES # BLD: 2.6 K/UL (ref 0.8–3.5)
LYMPHOCYTES NFR BLD: 12 % (ref 12–49)
LYMPHOCYTES NFR BLD: 14 % (ref 12–49)
LYMPHOCYTES NFR BLD: 18 % (ref 12–49)
MCH RBC QN AUTO: 28.5 PG (ref 26–34)
MCH RBC QN AUTO: 29.6 PG (ref 26–34)
MCH RBC QN AUTO: 29.9 PG (ref 26–34)
MCHC RBC AUTO-ENTMCNC: 30.9 G/DL (ref 30–36.5)
MCHC RBC AUTO-ENTMCNC: 31 G/DL (ref 30–36.5)
MCHC RBC AUTO-ENTMCNC: 31.5 G/DL (ref 30–36.5)
MCV RBC AUTO: 92.1 FL (ref 80–99)
MCV RBC AUTO: 95.1 FL (ref 80–99)
MCV RBC AUTO: 96 FL (ref 80–99)
MONOCYTES # BLD: 0.8 K/UL (ref 0–1)
MONOCYTES # BLD: 1.3 K/UL (ref 0–1)
MONOCYTES # BLD: 1.4 K/UL (ref 0–1)
MONOCYTES NFR BLD: 10 % (ref 5–13)
MONOCYTES NFR BLD: 12 % (ref 5–13)
MONOCYTES NFR BLD: 13 % (ref 5–13)
MUCOUS THREADS URNS QL MICRO: ABNORMAL /LPF
NEUTS SEG # BLD: 4.1 K/UL (ref 1.8–8)
NEUTS SEG # BLD: 6.8 K/UL (ref 1.8–8)
NEUTS SEG # BLD: 9.5 K/UL (ref 1.8–8)
NEUTS SEG NFR BLD: 64 % (ref 32–75)
NEUTS SEG NFR BLD: 67 % (ref 32–75)
NEUTS SEG NFR BLD: 68 % (ref 32–75)
NITRITE UR QL STRIP.AUTO: NEGATIVE
NRBC # BLD: 0 K/UL (ref 0–0.01)
NRBC BLD-RTO: 0 PER 100 WBC
P-R INTERVAL, ECG05: 160 MS
P-R INTERVAL, ECG05: 160 MS
PH UR STRIP: 5.5 [PH] (ref 5–8)
PLATELET # BLD AUTO: 243 K/UL (ref 150–400)
PLATELET # BLD AUTO: 273 K/UL (ref 150–400)
PLATELET # BLD AUTO: 296 K/UL (ref 150–400)
PMV BLD AUTO: 10.1 FL (ref 8.9–12.9)
PMV BLD AUTO: 10.3 FL (ref 8.9–12.9)
PMV BLD AUTO: 9.4 FL (ref 8.9–12.9)
POTASSIUM SERPL-SCNC: 3.5 MMOL/L (ref 3.5–5.1)
POTASSIUM SERPL-SCNC: 3.8 MMOL/L (ref 3.5–5.1)
POTASSIUM SERPL-SCNC: 4.1 MMOL/L (ref 3.5–5.1)
PROT SERPL-MCNC: 6.2 G/DL (ref 6.4–8.2)
PROT SERPL-MCNC: 6.8 G/DL (ref 6.4–8.2)
PROT UR STRIP-MCNC: 30 MG/DL
PROT UR STRIP-MCNC: ABNORMAL MG/DL
PROT UR STRIP-MCNC: NEGATIVE MG/DL
Q-T INTERVAL, ECG07: 354 MS
Q-T INTERVAL, ECG07: 364 MS
Q-T INTERVAL, ECG07: 366 MS
Q-T INTERVAL, ECG07: 374 MS
QRS DURATION, ECG06: 112 MS
QRS DURATION, ECG06: 114 MS
QRS DURATION, ECG06: 114 MS
QRS DURATION, ECG06: 116 MS
QTC CALCULATION (BEZET), ECG08: 470 MS
QTC CALCULATION (BEZET), ECG08: 472 MS
QTC CALCULATION (BEZET), ECG08: 476 MS
QTC CALCULATION (BEZET), ECG08: 479 MS
RBC # BLD AUTO: 3.78 M/UL (ref 3.8–5.2)
RBC # BLD AUTO: 4.51 M/UL (ref 3.8–5.2)
RBC # BLD AUTO: 4.66 M/UL (ref 3.8–5.2)
RBC #/AREA URNS HPF: ABNORMAL /HPF (ref 0–5)
RBC MORPH BLD: ABNORMAL
SAMPLES BEING HELD,HOLD: NORMAL
SERVICE CMNT-IMP: ABNORMAL
SERVICE CMNT-IMP: ABNORMAL
SERVICE CMNT-IMP: NORMAL
SODIUM SERPL-SCNC: 139 MMOL/L (ref 136–145)
SODIUM SERPL-SCNC: 143 MMOL/L (ref 136–145)
SODIUM SERPL-SCNC: 144 MMOL/L (ref 136–145)
SP GR UR REFRACTOMETRY: 1.02 (ref 1–1.03)
SP GR UR REFRACTOMETRY: 1.02 (ref 1–1.03)
SP GR UR REFRACTOMETRY: 1.03 (ref 1–1.03)
TROPONIN I BLD-MCNC: <0.04 NG/ML (ref 0–0.08)
TROPONIN I SERPL-MCNC: <0.05 NG/ML
TSH SERPL DL<=0.05 MIU/L-ACNC: 1.76 UIU/ML (ref 0.36–3.74)
UA: UC IF INDICATED,UAUC: ABNORMAL
UA: UC IF INDICATED,UAUC: ABNORMAL
UROBILINOGEN UR QL STRIP.AUTO: 0.2 EU/DL (ref 0.2–1)
UROBILINOGEN UR QL STRIP.AUTO: 1 EU/DL (ref 0.2–1)
UROBILINOGEN UR QL STRIP.AUTO: 1 EU/DL (ref 0.2–1)
VENTRICULAR RATE, ECG03: 103 BPM
VENTRICULAR RATE, ECG03: 103 BPM
VENTRICULAR RATE, ECG03: 106 BPM
VENTRICULAR RATE, ECG03: 96 BPM
VIT B12 SERPL-MCNC: 1621 PG/ML (ref 193–986)
WBC # BLD AUTO: 14.2 K/UL (ref 3.6–11)
WBC # BLD AUTO: 6.5 K/UL (ref 3.6–11)
WBC # BLD AUTO: 9.9 K/UL (ref 3.6–11)
WBC URNS QL MICRO: ABNORMAL /HPF (ref 0–4)

## 2019-01-01 PROCEDURE — 74011250636 HC RX REV CODE- 250/636: Performed by: NURSE PRACTITIONER

## 2019-01-01 PROCEDURE — 96372 THER/PROPH/DIAG INJ SC/IM: CPT

## 2019-01-01 PROCEDURE — 74011250636 HC RX REV CODE- 250/636

## 2019-01-01 PROCEDURE — 51701 INSERT BLADDER CATHETER: CPT

## 2019-01-01 PROCEDURE — 0656 HSPC GENERAL INPATIENT

## 2019-01-01 PROCEDURE — 71046 X-RAY EXAM CHEST 2 VIEWS: CPT

## 2019-01-01 PROCEDURE — 70551 MRI BRAIN STEM W/O DYE: CPT

## 2019-01-01 PROCEDURE — 87040 BLOOD CULTURE FOR BACTERIA: CPT

## 2019-01-01 PROCEDURE — 70450 CT HEAD/BRAIN W/O DYE: CPT

## 2019-01-01 PROCEDURE — G0155 HHCP-SVS OF CSW,EA 15 MIN: HCPCS

## 2019-01-01 PROCEDURE — 71045 X-RAY EXAM CHEST 1 VIEW: CPT

## 2019-01-01 PROCEDURE — 74011000258 HC RX REV CODE- 258: Performed by: INTERNAL MEDICINE

## 2019-01-01 PROCEDURE — 95816 EEG AWAKE AND DROWSY: CPT | Performed by: PSYCHIATRY & NEUROLOGY

## 2019-01-01 PROCEDURE — 83605 ASSAY OF LACTIC ACID: CPT

## 2019-01-01 PROCEDURE — 71275 CT ANGIOGRAPHY CHEST: CPT

## 2019-01-01 PROCEDURE — 94640 AIRWAY INHALATION TREATMENT: CPT

## 2019-01-01 PROCEDURE — 85025 COMPLETE CBC W/AUTO DIFF WBC: CPT

## 2019-01-01 PROCEDURE — 74011250637 HC RX REV CODE- 250/637: Performed by: INTERNAL MEDICINE

## 2019-01-01 PROCEDURE — 74011250636 HC RX REV CODE- 250/636: Performed by: INTERNAL MEDICINE

## 2019-01-01 PROCEDURE — G0299 HHS/HOSPICE OF RN EA 15 MIN: HCPCS

## 2019-01-01 PROCEDURE — 84484 ASSAY OF TROPONIN QUANT: CPT

## 2019-01-01 PROCEDURE — 74011000250 HC RX REV CODE- 250: Performed by: INTERNAL MEDICINE

## 2019-01-01 PROCEDURE — 74011000250 HC RX REV CODE- 250: Performed by: EMERGENCY MEDICINE

## 2019-01-01 PROCEDURE — 99285 EMERGENCY DEPT VISIT HI MDM: CPT

## 2019-01-01 PROCEDURE — 74011000250 HC RX REV CODE- 250: Performed by: NURSE PRACTITIONER

## 2019-01-01 PROCEDURE — 77030029684 HC NEB SM VOL KT MONA -A

## 2019-01-01 PROCEDURE — 81001 URINALYSIS AUTO W/SCOPE: CPT

## 2019-01-01 PROCEDURE — 36415 COLL VENOUS BLD VENIPUNCTURE: CPT

## 2019-01-01 PROCEDURE — 3336500001 HSPC ELECTION

## 2019-01-01 PROCEDURE — 74011250637 HC RX REV CODE- 250/637: Performed by: NURSE PRACTITIONER

## 2019-01-01 PROCEDURE — 87077 CULTURE AEROBIC IDENTIFY: CPT

## 2019-01-01 PROCEDURE — 87186 SC STD MICRODIL/AGAR DIL: CPT

## 2019-01-01 PROCEDURE — 99223 1ST HOSP IP/OBS HIGH 75: CPT | Performed by: INTERNAL MEDICINE

## 2019-01-01 PROCEDURE — 74011636320 HC RX REV CODE- 636/320: Performed by: RADIOLOGY

## 2019-01-01 PROCEDURE — 80053 COMPREHEN METABOLIC PANEL: CPT

## 2019-01-01 PROCEDURE — 77030038269 HC DRN EXT URIN PURWCK BARD -A

## 2019-01-01 PROCEDURE — 93306 TTE W/DOPPLER COMPLETE: CPT

## 2019-01-01 PROCEDURE — 99233 SBSQ HOSP IP/OBS HIGH 50: CPT | Performed by: INTERNAL MEDICINE

## 2019-01-01 PROCEDURE — 96375 TX/PRO/DX INJ NEW DRUG ADDON: CPT

## 2019-01-01 PROCEDURE — 76450000000

## 2019-01-01 PROCEDURE — 65270000029 HC RM PRIVATE

## 2019-01-01 PROCEDURE — 87086 URINE CULTURE/COLONY COUNT: CPT

## 2019-01-01 PROCEDURE — 80048 BASIC METABOLIC PNL TOTAL CA: CPT

## 2019-01-01 PROCEDURE — 74177 CT ABD & PELVIS W/CONTRAST: CPT

## 2019-01-01 PROCEDURE — 93005 ELECTROCARDIOGRAM TRACING: CPT

## 2019-01-01 PROCEDURE — 65660000000 HC RM CCU STEPDOWN

## 2019-01-01 PROCEDURE — 74011000258 HC RX REV CODE- 258: Performed by: EMERGENCY MEDICINE

## 2019-01-01 PROCEDURE — 96365 THER/PROPH/DIAG IV INF INIT: CPT

## 2019-01-01 PROCEDURE — 82962 GLUCOSE BLOOD TEST: CPT

## 2019-01-01 PROCEDURE — 84443 ASSAY THYROID STIM HORMONE: CPT

## 2019-01-01 PROCEDURE — 82607 VITAMIN B-12: CPT

## 2019-01-01 PROCEDURE — 74011250636 HC RX REV CODE- 250/636: Performed by: EMERGENCY MEDICINE

## 2019-01-01 RX ORDER — QUETIAPINE FUMARATE 25 MG/1
25 TABLET, FILM COATED ORAL 2 TIMES DAILY
Status: DISCONTINUED | OUTPATIENT
Start: 2019-01-01 | End: 2019-01-01 | Stop reason: HOSPADM

## 2019-01-01 RX ORDER — CEPHALEXIN 250 MG/1
500 CAPSULE ORAL EVERY 8 HOURS
Status: DISCONTINUED | OUTPATIENT
Start: 2019-01-01 | End: 2019-01-01

## 2019-01-01 RX ORDER — CEFTRIAXONE 1 G/1
INJECTION, POWDER, FOR SOLUTION INTRAMUSCULAR; INTRAVENOUS
Status: DISCONTINUED
Start: 2019-01-01 | End: 2019-01-01 | Stop reason: HOSPADM

## 2019-01-01 RX ORDER — RANITIDINE 150 MG/1
150 TABLET, FILM COATED ORAL 2 TIMES DAILY
COMMUNITY
End: 2019-01-01

## 2019-01-01 RX ORDER — BUDESONIDE 0.5 MG/2ML
500 INHALANT ORAL
Status: DISCONTINUED | OUTPATIENT
Start: 2019-01-01 | End: 2019-01-01 | Stop reason: HOSPADM

## 2019-01-01 RX ORDER — PREDNISONE 20 MG/1
20 TABLET ORAL
COMMUNITY
End: 2019-01-01

## 2019-01-01 RX ORDER — ALBUTEROL SULFATE 0.83 MG/ML
2.5 SOLUTION RESPIRATORY (INHALATION) 3 TIMES DAILY
COMMUNITY
End: 2019-01-01

## 2019-01-01 RX ORDER — MEMANTINE HYDROCHLORIDE 10 MG/1
10 TABLET ORAL DAILY
COMMUNITY
End: 2019-01-01

## 2019-01-01 RX ORDER — MORPHINE SULFATE 4 MG/ML
4 INJECTION INTRAVENOUS
Status: DISCONTINUED | OUTPATIENT
Start: 2019-01-01 | End: 2019-01-01 | Stop reason: HOSPADM

## 2019-01-01 RX ORDER — MORPHINE SULFATE 20 MG/ML
10 SOLUTION ORAL
COMMUNITY
End: 2019-01-01

## 2019-01-01 RX ORDER — PREDNISONE 20 MG/1
20 TABLET ORAL
Status: DISCONTINUED | OUTPATIENT
Start: 2019-01-01 | End: 2019-01-01

## 2019-01-01 RX ORDER — DILTIAZEM HYDROCHLORIDE 5 MG/ML
5 INJECTION INTRAVENOUS ONCE
Status: COMPLETED | OUTPATIENT
Start: 2019-01-01 | End: 2019-01-01

## 2019-01-01 RX ORDER — LORAZEPAM 2 MG/ML
1 INJECTION INTRAMUSCULAR
Status: DISCONTINUED | OUTPATIENT
Start: 2019-01-01 | End: 2019-01-01 | Stop reason: HOSPADM

## 2019-01-01 RX ORDER — HALOPERIDOL 5 MG/ML
2 INJECTION INTRAMUSCULAR EVERY 6 HOURS
Status: DISCONTINUED | OUTPATIENT
Start: 2019-01-01 | End: 2019-01-01

## 2019-01-01 RX ORDER — KETOROLAC TROMETHAMINE 30 MG/ML
INJECTION, SOLUTION INTRAMUSCULAR; INTRAVENOUS
Status: DISCONTINUED
Start: 2019-01-01 | End: 2019-01-01 | Stop reason: HOSPADM

## 2019-01-01 RX ORDER — CEPHALEXIN 500 MG/1
500 CAPSULE ORAL 4 TIMES DAILY
COMMUNITY
End: 2019-01-01

## 2019-01-01 RX ORDER — DIPHENHYDRAMINE HYDROCHLORIDE 50 MG/ML
25 INJECTION, SOLUTION INTRAMUSCULAR; INTRAVENOUS
Status: DISCONTINUED | OUTPATIENT
Start: 2019-01-01 | End: 2019-01-01 | Stop reason: HOSPADM

## 2019-01-01 RX ORDER — FACIAL-BODY WIPES
10 EACH TOPICAL DAILY PRN
Status: DISCONTINUED | OUTPATIENT
Start: 2019-01-01 | End: 2019-01-01 | Stop reason: HOSPADM

## 2019-01-01 RX ORDER — MORPHINE SULFATE 20 MG/ML
10 SOLUTION ORAL
Status: DISCONTINUED | OUTPATIENT
Start: 2019-01-01 | End: 2019-01-01 | Stop reason: HOSPADM

## 2019-01-01 RX ORDER — ENOXAPARIN SODIUM 100 MG/ML
1 INJECTION SUBCUTANEOUS
Status: COMPLETED | OUTPATIENT
Start: 2019-01-01 | End: 2019-01-01

## 2019-01-01 RX ORDER — ENOXAPARIN SODIUM 100 MG/ML
40 INJECTION SUBCUTANEOUS
Status: DISCONTINUED | OUTPATIENT
Start: 2019-01-01 | End: 2019-01-01

## 2019-01-01 RX ORDER — SODIUM CHLORIDE 0.9 % (FLUSH) 0.9 %
5-10 SYRINGE (ML) INJECTION AS NEEDED
Status: DISCONTINUED | OUTPATIENT
Start: 2019-01-01 | End: 2019-01-01 | Stop reason: HOSPADM

## 2019-01-01 RX ORDER — MORPHINE SULFATE 2 MG/ML
4 INJECTION, SOLUTION INTRAMUSCULAR; INTRAVENOUS
Status: DISCONTINUED | OUTPATIENT
Start: 2019-01-01 | End: 2019-01-01 | Stop reason: HOSPADM

## 2019-01-01 RX ORDER — HALOPERIDOL 5 MG/ML
5 INJECTION INTRAMUSCULAR
Status: COMPLETED | OUTPATIENT
Start: 2019-01-01 | End: 2019-01-01

## 2019-01-01 RX ORDER — HALOPERIDOL 2 MG/ML
2 SOLUTION ORAL EVERY 6 HOURS
Status: DISCONTINUED | OUTPATIENT
Start: 2019-01-01 | End: 2019-01-01

## 2019-01-01 RX ORDER — CIPROFLOXACIN 500 MG/1
500 TABLET ORAL 2 TIMES DAILY
Qty: 14 TAB | Refills: 0 | Status: SHIPPED | OUTPATIENT
Start: 2019-01-01 | End: 2019-01-01

## 2019-01-01 RX ORDER — LORAZEPAM 2 MG/ML
0.5 INJECTION INTRAMUSCULAR
Status: COMPLETED | OUTPATIENT
Start: 2019-01-01 | End: 2019-01-01

## 2019-01-01 RX ORDER — CEPHALEXIN 250 MG/1
500 CAPSULE ORAL EVERY 6 HOURS
Status: DISCONTINUED | OUTPATIENT
Start: 2019-01-01 | End: 2019-01-01

## 2019-01-01 RX ORDER — ESCITALOPRAM OXALATE 10 MG/1
10 TABLET ORAL DAILY
Status: DISCONTINUED | OUTPATIENT
Start: 2019-01-01 | End: 2019-01-01 | Stop reason: HOSPADM

## 2019-01-01 RX ORDER — SODIUM CHLORIDE 900 MG/100ML
INJECTION INTRAVENOUS
Status: DISCONTINUED
Start: 2019-01-01 | End: 2019-01-01 | Stop reason: HOSPADM

## 2019-01-01 RX ORDER — LORAZEPAM 2 MG/ML
0.5 CONCENTRATE ORAL
COMMUNITY
End: 2019-01-01

## 2019-01-01 RX ORDER — MONTELUKAST SODIUM 10 MG/1
10 TABLET ORAL DAILY
Status: DISCONTINUED | OUTPATIENT
Start: 2019-01-01 | End: 2019-01-01

## 2019-01-01 RX ORDER — HALOPERIDOL 5 MG/ML
5 INJECTION INTRAMUSCULAR
Status: DISCONTINUED | OUTPATIENT
Start: 2019-01-01 | End: 2019-01-01 | Stop reason: HOSPADM

## 2019-01-01 RX ORDER — LORAZEPAM 2 MG/ML
1 INJECTION INTRAMUSCULAR ONCE
Status: COMPLETED | OUTPATIENT
Start: 2019-01-01 | End: 2019-01-01

## 2019-01-01 RX ORDER — DIPHENHYDRAMINE HCL 25 MG
25 CAPSULE ORAL
Status: DISCONTINUED | OUTPATIENT
Start: 2019-01-01 | End: 2019-01-01

## 2019-01-01 RX ORDER — HALOPERIDOL 5 MG/ML
INJECTION INTRAMUSCULAR
Status: COMPLETED
Start: 2019-01-01 | End: 2019-01-01

## 2019-01-01 RX ORDER — ACETAMINOPHEN 650 MG/1
650 SUPPOSITORY RECTAL
Status: DISCONTINUED | OUTPATIENT
Start: 2019-01-01 | End: 2019-01-01 | Stop reason: HOSPADM

## 2019-01-01 RX ORDER — MORPHINE SULFATE 4 MG/ML
2 INJECTION INTRAVENOUS
Status: DISCONTINUED | OUTPATIENT
Start: 2019-01-01 | End: 2019-01-01 | Stop reason: HOSPADM

## 2019-01-01 RX ORDER — QUETIAPINE FUMARATE 25 MG/1
25 TABLET, FILM COATED ORAL 2 TIMES DAILY
COMMUNITY
End: 2019-01-01

## 2019-01-01 RX ORDER — MORPHINE SULFATE 4 MG/ML
4 INJECTION INTRAVENOUS EVERY 4 HOURS
Status: DISCONTINUED | OUTPATIENT
Start: 2019-01-01 | End: 2019-01-01

## 2019-01-01 RX ORDER — FAMOTIDINE 20 MG/1
20 TABLET, FILM COATED ORAL DAILY
Status: DISCONTINUED | OUTPATIENT
Start: 2019-01-01 | End: 2019-01-01 | Stop reason: HOSPADM

## 2019-01-01 RX ORDER — KETOROLAC TROMETHAMINE 30 MG/ML
30 INJECTION, SOLUTION INTRAMUSCULAR; INTRAVENOUS
Status: DISCONTINUED | OUTPATIENT
Start: 2019-01-01 | End: 2019-01-01 | Stop reason: HOSPADM

## 2019-01-01 RX ORDER — ACETAMINOPHEN 325 MG/1
650 TABLET ORAL
Status: DISCONTINUED | OUTPATIENT
Start: 2019-01-01 | End: 2019-01-01

## 2019-01-01 RX ORDER — MEMANTINE HYDROCHLORIDE 10 MG/1
10 TABLET ORAL DAILY
Status: DISCONTINUED | OUTPATIENT
Start: 2019-01-01 | End: 2019-01-01

## 2019-01-01 RX ORDER — ALBUTEROL SULFATE 0.83 MG/ML
2.5 SOLUTION RESPIRATORY (INHALATION)
COMMUNITY
End: 2019-01-01

## 2019-01-01 RX ORDER — GLYCOPYRROLATE 0.2 MG/ML
0.2 INJECTION INTRAMUSCULAR; INTRAVENOUS
Status: DISCONTINUED | OUTPATIENT
Start: 2019-01-01 | End: 2019-01-01

## 2019-01-01 RX ORDER — DEXTROSE, SODIUM CHLORIDE, AND POTASSIUM CHLORIDE 5; .45; .15 G/100ML; G/100ML; G/100ML
75 INJECTION INTRAVENOUS CONTINUOUS
Status: DISCONTINUED | OUTPATIENT
Start: 2019-01-01 | End: 2019-01-01

## 2019-01-01 RX ORDER — TRAZODONE HYDROCHLORIDE 150 MG/1
150 TABLET ORAL
COMMUNITY
End: 2019-01-01

## 2019-01-01 RX ORDER — ONDANSETRON 2 MG/ML
4 INJECTION INTRAMUSCULAR; INTRAVENOUS
Status: DISCONTINUED | OUTPATIENT
Start: 2019-01-01 | End: 2019-01-01 | Stop reason: HOSPADM

## 2019-01-01 RX ORDER — KETOROLAC TROMETHAMINE 30 MG/ML
15 INJECTION, SOLUTION INTRAMUSCULAR; INTRAVENOUS
Status: COMPLETED | OUTPATIENT
Start: 2019-01-01 | End: 2019-01-01

## 2019-01-01 RX ORDER — LORAZEPAM 0.5 MG/1
0.25 TABLET ORAL
COMMUNITY
End: 2019-01-01

## 2019-01-01 RX ORDER — LORAZEPAM 2 MG/ML
INJECTION INTRAMUSCULAR
Status: DISPENSED
Start: 2019-01-01 | End: 2019-01-01

## 2019-01-01 RX ORDER — PREDNISONE 10 MG/1
TABLET ORAL
Qty: 21 TAB | Refills: 0 | Status: SHIPPED | OUTPATIENT
Start: 2019-01-01 | End: 2019-01-01

## 2019-01-01 RX ORDER — MORPHINE SULFATE 2 MG/ML
2 INJECTION, SOLUTION INTRAMUSCULAR; INTRAVENOUS
Status: DISCONTINUED | OUTPATIENT
Start: 2019-01-01 | End: 2019-01-01

## 2019-01-01 RX ORDER — CETIRIZINE HCL 10 MG
10 TABLET ORAL DAILY
COMMUNITY
End: 2019-01-01

## 2019-01-01 RX ORDER — BUDESONIDE 0.5 MG/2ML
500 INHALANT ORAL EVERY 6 HOURS
COMMUNITY
End: 2019-01-01

## 2019-01-01 RX ORDER — FLUTICASONE PROPIONATE 50 MCG
2 SPRAY, SUSPENSION (ML) NASAL DAILY
Status: DISCONTINUED | OUTPATIENT
Start: 2019-01-01 | End: 2019-01-01 | Stop reason: HOSPADM

## 2019-01-01 RX ORDER — HALOPERIDOL 5 MG/ML
2 INJECTION INTRAMUSCULAR
Status: DISCONTINUED | OUTPATIENT
Start: 2019-01-01 | End: 2019-01-01

## 2019-01-01 RX ORDER — GLYCOPYRROLATE 0.2 MG/ML
0.2 INJECTION INTRAMUSCULAR; INTRAVENOUS EVERY 6 HOURS
Status: DISCONTINUED | OUTPATIENT
Start: 2019-01-01 | End: 2019-01-01 | Stop reason: HOSPADM

## 2019-01-01 RX ADMIN — Medication 10 ML: at 10:03

## 2019-01-01 RX ADMIN — HALOPERIDOL LACTATE 2 MG: 2 SOLUTION, CONCENTRATE ORAL at 08:36

## 2019-01-01 RX ADMIN — MORPHINE SULFATE 4 MG: 4 INJECTION, SOLUTION INTRAMUSCULAR; INTRAVENOUS at 18:15

## 2019-01-01 RX ADMIN — MORPHINE SULFATE 2 MG: 4 INJECTION, SOLUTION INTRAMUSCULAR; INTRAVENOUS at 11:32

## 2019-01-01 RX ADMIN — HALOPERIDOL LACTATE 5 MG: 5 INJECTION, SOLUTION INTRAMUSCULAR at 20:31

## 2019-01-01 RX ADMIN — MORPHINE SULFATE 2 MG: 2 INJECTION, SOLUTION INTRAMUSCULAR; INTRAVENOUS at 11:34

## 2019-01-01 RX ADMIN — MORPHINE SULFATE 2 MG: 2 INJECTION, SOLUTION INTRAMUSCULAR; INTRAVENOUS at 23:01

## 2019-01-01 RX ADMIN — MORPHINE SULFATE 10 MG: 20 SOLUTION ORAL at 08:53

## 2019-01-01 RX ADMIN — MORPHINE SULFATE 2 MG: 4 INJECTION, SOLUTION INTRAMUSCULAR; INTRAVENOUS at 22:49

## 2019-01-01 RX ADMIN — ACETAMINOPHEN 650 MG: 650 SUPPOSITORY RECTAL at 22:00

## 2019-01-01 RX ADMIN — DILTIAZEM HYDROCHLORIDE 2.5 MG/HR: 5 INJECTION INTRAVENOUS at 22:55

## 2019-01-01 RX ADMIN — WATER 10 MG: 1 INJECTION INTRAMUSCULAR; INTRAVENOUS; SUBCUTANEOUS at 15:54

## 2019-01-01 RX ADMIN — HALOPERIDOL LACTATE 2 MG: 2 SOLUTION, CONCENTRATE ORAL at 02:43

## 2019-01-01 RX ADMIN — DILTIAZEM HYDROCHLORIDE 2.5 MG/HR: 5 INJECTION INTRAVENOUS at 18:42

## 2019-01-01 RX ADMIN — DEXTROSE MONOHYDRATE, SODIUM CHLORIDE, AND POTASSIUM CHLORIDE 75 ML/HR: 50; 4.5; 1.49 INJECTION, SOLUTION INTRAVENOUS at 21:59

## 2019-01-01 RX ADMIN — GLYCOPYRROLATE 0.2 MG: 0.2 INJECTION INTRAMUSCULAR; INTRAVENOUS at 15:00

## 2019-01-01 RX ADMIN — MORPHINE SULFATE 2 MG: 4 INJECTION, SOLUTION INTRAMUSCULAR; INTRAVENOUS at 21:37

## 2019-01-01 RX ADMIN — HALOPERIDOL LACTATE 2 MG: 2 SOLUTION, CONCENTRATE ORAL at 20:53

## 2019-01-01 RX ADMIN — KETOROLAC TROMETHAMINE 30 MG: 30 INJECTION, SOLUTION INTRAMUSCULAR; INTRAVENOUS at 08:10

## 2019-01-01 RX ADMIN — DIPHENHYDRAMINE HYDROCHLORIDE 25 MG: 50 INJECTION INTRAMUSCULAR; INTRAVENOUS at 20:17

## 2019-01-01 RX ADMIN — HALOPERIDOL LACTATE 2 MG: 5 INJECTION, SOLUTION INTRAMUSCULAR at 22:01

## 2019-01-01 RX ADMIN — HALOPERIDOL LACTATE 2 MG: 5 INJECTION INTRAMUSCULAR at 00:45

## 2019-01-01 RX ADMIN — MORPHINE SULFATE 2 MG: 2 INJECTION, SOLUTION INTRAMUSCULAR; INTRAVENOUS at 22:01

## 2019-01-01 RX ADMIN — QUETIAPINE FUMARATE 25 MG: 25 TABLET ORAL at 23:06

## 2019-01-01 RX ADMIN — GLYCOPYRROLATE 0.2 MG: 0.2 INJECTION INTRAMUSCULAR; INTRAVENOUS at 03:13

## 2019-01-01 RX ADMIN — MORPHINE SULFATE 2 MG: 2 INJECTION, SOLUTION INTRAMUSCULAR; INTRAVENOUS at 17:49

## 2019-01-01 RX ADMIN — CEFTRIAXONE SODIUM 1 G: 1 INJECTION, POWDER, FOR SOLUTION INTRAMUSCULAR; INTRAVENOUS at 01:37

## 2019-01-01 RX ADMIN — DIPHENHYDRAMINE HYDROCHLORIDE 25 MG: 50 INJECTION INTRAMUSCULAR; INTRAVENOUS at 13:07

## 2019-01-01 RX ADMIN — KETOROLAC TROMETHAMINE 15 MG: 30 INJECTION, SOLUTION INTRAMUSCULAR at 01:20

## 2019-01-01 RX ADMIN — BUDESONIDE 500 MCG: 0.5 INHALANT RESPIRATORY (INHALATION) at 19:33

## 2019-01-01 RX ADMIN — DEXTROSE MONOHYDRATE, SODIUM CHLORIDE, AND POTASSIUM CHLORIDE 100 ML/HR: 50; 4.5; 1.49 INJECTION, SOLUTION INTRAVENOUS at 21:04

## 2019-01-01 RX ADMIN — LORAZEPAM 1 MG: 2 INJECTION INTRAMUSCULAR; INTRAVENOUS at 10:06

## 2019-01-01 RX ADMIN — Medication 10 ML: at 10:23

## 2019-01-01 RX ADMIN — MORPHINE SULFATE 4 MG: 4 INJECTION, SOLUTION INTRAMUSCULAR; INTRAVENOUS at 17:59

## 2019-01-01 RX ADMIN — MORPHINE SULFATE 2 MG: 4 INJECTION, SOLUTION INTRAMUSCULAR; INTRAVENOUS at 06:04

## 2019-01-01 RX ADMIN — MORPHINE SULFATE 2 MG: 2 INJECTION, SOLUTION INTRAMUSCULAR; INTRAVENOUS at 14:00

## 2019-01-01 RX ADMIN — MORPHINE SULFATE 4 MG: 4 INJECTION, SOLUTION INTRAMUSCULAR; INTRAVENOUS at 08:10

## 2019-01-01 RX ADMIN — DIPHENHYDRAMINE HYDROCHLORIDE 25 MG: 50 INJECTION INTRAMUSCULAR; INTRAVENOUS at 10:37

## 2019-01-01 RX ADMIN — SODIUM CHLORIDE 1000 ML: 900 INJECTION, SOLUTION INTRAVENOUS at 02:40

## 2019-01-01 RX ADMIN — ENOXAPARIN SODIUM 40 MG: 40 INJECTION SUBCUTANEOUS at 06:04

## 2019-01-01 RX ADMIN — MORPHINE SULFATE 2 MG: 2 INJECTION, SOLUTION INTRAMUSCULAR; INTRAVENOUS at 06:41

## 2019-01-01 RX ADMIN — WATER 20 MG: 1 INJECTION INTRAMUSCULAR; INTRAVENOUS; SUBCUTANEOUS at 20:56

## 2019-01-01 RX ADMIN — MORPHINE SULFATE 2 MG: 2 INJECTION, SOLUTION INTRAMUSCULAR; INTRAVENOUS at 07:34

## 2019-01-01 RX ADMIN — QUETIAPINE FUMARATE 25 MG: 25 TABLET ORAL at 20:55

## 2019-01-01 RX ADMIN — HALOPERIDOL 5 MG: 5 INJECTION INTRAMUSCULAR at 23:11

## 2019-01-01 RX ADMIN — MORPHINE SULFATE 4 MG: 4 INJECTION, SOLUTION INTRAMUSCULAR; INTRAVENOUS at 22:38

## 2019-01-01 RX ADMIN — MORPHINE SULFATE 2 MG: 4 INJECTION, SOLUTION INTRAMUSCULAR; INTRAVENOUS at 15:11

## 2019-01-01 RX ADMIN — WATER 10 MG: 1 INJECTION INTRAMUSCULAR; INTRAVENOUS; SUBCUTANEOUS at 08:11

## 2019-01-01 RX ADMIN — MORPHINE SULFATE 10 MG: 20 SOLUTION ORAL at 12:24

## 2019-01-01 RX ADMIN — WATER 20 MG: 1 INJECTION INTRAMUSCULAR; INTRAVENOUS; SUBCUTANEOUS at 20:50

## 2019-01-01 RX ADMIN — HALOPERIDOL LACTATE 5 MG: 5 INJECTION, SOLUTION INTRAMUSCULAR at 16:20

## 2019-01-01 RX ADMIN — HALOPERIDOL LACTATE 2 MG: 5 INJECTION INTRAMUSCULAR at 11:11

## 2019-01-01 RX ADMIN — MORPHINE SULFATE 2 MG: 2 INJECTION, SOLUTION INTRAMUSCULAR; INTRAVENOUS at 15:45

## 2019-01-01 RX ADMIN — DEXTROSE MONOHYDRATE, SODIUM CHLORIDE, AND POTASSIUM CHLORIDE 75 ML/HR: 50; 4.5; 1.49 INJECTION, SOLUTION INTRAVENOUS at 19:40

## 2019-01-01 RX ADMIN — DILTIAZEM HYDROCHLORIDE 5 MG: 5 INJECTION INTRAVENOUS at 18:19

## 2019-01-01 RX ADMIN — MORPHINE SULFATE 4 MG: 4 INJECTION, SOLUTION INTRAMUSCULAR; INTRAVENOUS at 06:45

## 2019-01-01 RX ADMIN — MORPHINE SULFATE 4 MG: 4 INJECTION, SOLUTION INTRAMUSCULAR; INTRAVENOUS at 11:11

## 2019-01-01 RX ADMIN — MORPHINE SULFATE 2 MG: 2 INJECTION, SOLUTION INTRAMUSCULAR; INTRAVENOUS at 22:27

## 2019-01-01 RX ADMIN — MORPHINE SULFATE 4 MG: 4 INJECTION, SOLUTION INTRAMUSCULAR; INTRAVENOUS at 00:17

## 2019-01-01 RX ADMIN — MORPHINE SULFATE 4 MG: 4 INJECTION, SOLUTION INTRAMUSCULAR; INTRAVENOUS at 10:03

## 2019-01-01 RX ADMIN — WATER 20 MG: 1 INJECTION INTRAMUSCULAR; INTRAVENOUS; SUBCUTANEOUS at 08:36

## 2019-01-01 RX ADMIN — HALOPERIDOL LACTATE 2 MG: 5 INJECTION INTRAMUSCULAR at 21:28

## 2019-01-01 RX ADMIN — MORPHINE SULFATE 2 MG: 2 INJECTION, SOLUTION INTRAMUSCULAR; INTRAVENOUS at 03:30

## 2019-01-01 RX ADMIN — HALOPERIDOL LACTATE 5 MG: 5 INJECTION, SOLUTION INTRAMUSCULAR at 06:35

## 2019-01-01 RX ADMIN — HALOPERIDOL LACTATE 2 MG: 5 INJECTION, SOLUTION INTRAMUSCULAR at 15:45

## 2019-01-01 RX ADMIN — MORPHINE SULFATE 2 MG: 4 INJECTION, SOLUTION INTRAMUSCULAR; INTRAVENOUS at 16:00

## 2019-01-01 RX ADMIN — HALOPERIDOL LACTATE 5 MG: 5 INJECTION, SOLUTION INTRAMUSCULAR at 04:23

## 2019-01-01 RX ADMIN — LORAZEPAM 0.5 MG: 2 INJECTION, SOLUTION INTRAMUSCULAR; INTRAVENOUS at 18:26

## 2019-01-01 RX ADMIN — HALOPERIDOL LACTATE 2 MG: 5 INJECTION, SOLUTION INTRAMUSCULAR at 14:50

## 2019-01-01 RX ADMIN — MORPHINE SULFATE 4 MG: 4 INJECTION, SOLUTION INTRAMUSCULAR; INTRAVENOUS at 06:57

## 2019-01-01 RX ADMIN — MORPHINE SULFATE 2 MG: 2 INJECTION, SOLUTION INTRAMUSCULAR; INTRAVENOUS at 12:22

## 2019-01-01 RX ADMIN — HALOPERIDOL LACTATE 2 MG: 5 INJECTION, SOLUTION INTRAMUSCULAR at 10:03

## 2019-01-01 RX ADMIN — LORAZEPAM 1 MG: 2 INJECTION, SOLUTION INTRAMUSCULAR; INTRAVENOUS at 20:47

## 2019-01-01 RX ADMIN — MORPHINE SULFATE 2 MG: 2 INJECTION, SOLUTION INTRAMUSCULAR; INTRAVENOUS at 09:10

## 2019-01-01 RX ADMIN — SODIUM CHLORIDE 1000 ML: 900 INJECTION, SOLUTION INTRAVENOUS at 18:42

## 2019-01-01 RX ADMIN — HALOPERIDOL LACTATE 2 MG: 5 INJECTION, SOLUTION INTRAMUSCULAR at 13:06

## 2019-01-01 RX ADMIN — MORPHINE SULFATE 4 MG: 4 INJECTION, SOLUTION INTRAMUSCULAR; INTRAVENOUS at 03:12

## 2019-01-01 RX ADMIN — HALOPERIDOL LACTATE 2 MG: 5 INJECTION, SOLUTION INTRAMUSCULAR at 05:55

## 2019-01-01 RX ADMIN — MORPHINE SULFATE 2 MG: 2 INJECTION, SOLUTION INTRAMUSCULAR; INTRAVENOUS at 15:58

## 2019-01-01 RX ADMIN — HALOPERIDOL LACTATE 2 MG: 5 INJECTION INTRAMUSCULAR at 05:58

## 2019-01-01 RX ADMIN — MORPHINE SULFATE 4 MG: 4 INJECTION, SOLUTION INTRAMUSCULAR; INTRAVENOUS at 14:59

## 2019-01-01 RX ADMIN — GLYCOPYRROLATE 0.2 MG: 0.2 INJECTION INTRAMUSCULAR; INTRAVENOUS at 20:59

## 2019-01-01 RX ADMIN — BUDESONIDE 500 MCG: 0.5 INHALANT RESPIRATORY (INHALATION) at 08:00

## 2019-01-01 RX ADMIN — HALOPERIDOL LACTATE 2 MG: 2 SOLUTION, CONCENTRATE ORAL at 14:45

## 2019-01-01 RX ADMIN — MORPHINE SULFATE 4 MG: 2 INJECTION, SOLUTION INTRAMUSCULAR; INTRAVENOUS at 09:26

## 2019-01-01 RX ADMIN — DIPHENHYDRAMINE HYDROCHLORIDE 25 MG: 50 INJECTION INTRAMUSCULAR; INTRAVENOUS at 15:45

## 2019-01-01 RX ADMIN — HALOPERIDOL LACTATE 2 MG: 5 INJECTION, SOLUTION INTRAMUSCULAR at 07:34

## 2019-01-01 RX ADMIN — MORPHINE SULFATE 4 MG: 4 INJECTION, SOLUTION INTRAMUSCULAR; INTRAVENOUS at 23:08

## 2019-01-01 RX ADMIN — MORPHINE SULFATE 2 MG: 4 INJECTION, SOLUTION INTRAMUSCULAR; INTRAVENOUS at 05:29

## 2019-01-01 RX ADMIN — HALOPERIDOL LACTATE 5 MG: 5 INJECTION, SOLUTION INTRAMUSCULAR at 12:24

## 2019-01-01 RX ADMIN — MORPHINE SULFATE 10 MG: 20 SOLUTION ORAL at 16:23

## 2019-01-01 RX ADMIN — MORPHINE SULFATE 4 MG: 2 INJECTION, SOLUTION INTRAMUSCULAR; INTRAVENOUS at 10:06

## 2019-01-01 RX ADMIN — MORPHINE SULFATE 4 MG: 4 INJECTION, SOLUTION INTRAMUSCULAR; INTRAVENOUS at 14:39

## 2019-01-01 RX ADMIN — IOPAMIDOL 100 ML: 755 INJECTION, SOLUTION INTRAVENOUS at 19:09

## 2019-01-01 RX ADMIN — MORPHINE SULFATE 2 MG: 2 INJECTION, SOLUTION INTRAMUSCULAR; INTRAVENOUS at 10:39

## 2019-01-01 RX ADMIN — BUDESONIDE 500 MCG: 0.5 INHALANT RESPIRATORY (INHALATION) at 15:35

## 2019-01-01 RX ADMIN — MORPHINE SULFATE 2 MG: 2 INJECTION, SOLUTION INTRAMUSCULAR; INTRAVENOUS at 15:36

## 2019-01-01 RX ADMIN — MORPHINE SULFATE 2 MG: 2 INJECTION, SOLUTION INTRAMUSCULAR; INTRAVENOUS at 01:37

## 2019-01-01 RX ADMIN — GLYCOPYRROLATE 0.2 MG: 0.2 INJECTION INTRAMUSCULAR; INTRAVENOUS at 08:10

## 2019-01-01 RX ADMIN — HALOPERIDOL LACTATE 2 MG: 5 INJECTION, SOLUTION INTRAMUSCULAR at 11:34

## 2019-01-01 RX ADMIN — MORPHINE SULFATE 10 MG: 20 SOLUTION ORAL at 09:26

## 2019-01-01 RX ADMIN — MORPHINE SULFATE 2 MG: 2 INJECTION, SOLUTION INTRAMUSCULAR; INTRAVENOUS at 09:51

## 2019-01-01 RX ADMIN — MORPHINE SULFATE 2 MG: 2 INJECTION, SOLUTION INTRAMUSCULAR; INTRAVENOUS at 17:38

## 2019-01-01 RX ADMIN — LORAZEPAM 1 MG: 2 INJECTION INTRAMUSCULAR; INTRAVENOUS at 09:26

## 2019-01-01 RX ADMIN — DIPHENHYDRAMINE HYDROCHLORIDE 25 MG: 50 INJECTION, SOLUTION INTRAMUSCULAR; INTRAVENOUS at 05:35

## 2019-01-01 RX ADMIN — MORPHINE SULFATE 4 MG: 4 INJECTION, SOLUTION INTRAMUSCULAR; INTRAVENOUS at 14:45

## 2019-01-01 RX ADMIN — BUDESONIDE 500 MCG: 0.5 INHALANT RESPIRATORY (INHALATION) at 19:25

## 2019-01-01 RX ADMIN — MORPHINE SULFATE 4 MG: 4 INJECTION, SOLUTION INTRAMUSCULAR; INTRAVENOUS at 02:43

## 2019-01-01 RX ADMIN — MORPHINE SULFATE 4 MG: 4 INJECTION, SOLUTION INTRAMUSCULAR; INTRAVENOUS at 20:59

## 2019-01-01 RX ADMIN — MORPHINE SULFATE 4 MG: 2 INJECTION, SOLUTION INTRAMUSCULAR; INTRAVENOUS at 08:06

## 2019-01-01 RX ADMIN — BUDESONIDE 500 MCG: 0.5 INHALANT RESPIRATORY (INHALATION) at 08:15

## 2019-01-01 RX ADMIN — HALOPERIDOL LACTATE 2 MG: 5 INJECTION, SOLUTION INTRAMUSCULAR at 01:37

## 2019-01-01 RX ADMIN — ENOXAPARIN SODIUM 40 MG: 40 INJECTION SUBCUTANEOUS at 14:10

## 2019-01-01 RX ADMIN — HALOPERIDOL LACTATE 5 MG: 5 INJECTION, SOLUTION INTRAMUSCULAR at 10:23

## 2019-01-01 RX ADMIN — WATER 20 MG: 1 INJECTION INTRAMUSCULAR; INTRAVENOUS; SUBCUTANEOUS at 08:52

## 2019-01-01 RX ADMIN — MORPHINE SULFATE 2 MG: 4 INJECTION, SOLUTION INTRAMUSCULAR; INTRAVENOUS at 10:03

## 2019-01-01 RX ADMIN — DEXTROSE MONOHYDRATE, SODIUM CHLORIDE, AND POTASSIUM CHLORIDE 100 ML/HR: 50; 4.5; 1.49 INJECTION, SOLUTION INTRAVENOUS at 08:40

## 2019-01-01 RX ADMIN — MORPHINE SULFATE 2 MG: 2 INJECTION, SOLUTION INTRAMUSCULAR; INTRAVENOUS at 13:07

## 2019-01-01 RX ADMIN — MORPHINE SULFATE 2 MG: 2 INJECTION, SOLUTION INTRAMUSCULAR; INTRAVENOUS at 10:37

## 2019-01-01 RX ADMIN — GLYCOPYRROLATE 0.2 MG: 0.2 INJECTION, SOLUTION INTRAMUSCULAR; INTRAVENOUS at 16:05

## 2019-01-01 RX ADMIN — MORPHINE SULFATE 2 MG: 2 INJECTION, SOLUTION INTRAMUSCULAR; INTRAVENOUS at 07:49

## 2019-01-01 RX ADMIN — MORPHINE SULFATE 4 MG: 2 INJECTION, SOLUTION INTRAMUSCULAR; INTRAVENOUS at 07:49

## 2019-01-01 RX ADMIN — HALOPERIDOL LACTATE 5 MG: 5 INJECTION, SOLUTION INTRAMUSCULAR at 00:47

## 2019-01-01 RX ADMIN — MORPHINE SULFATE 4 MG: 4 INJECTION, SOLUTION INTRAMUSCULAR; INTRAVENOUS at 03:18

## 2019-01-01 RX ADMIN — HALOPERIDOL LACTATE 2 MG: 5 INJECTION, SOLUTION INTRAMUSCULAR at 03:31

## 2019-01-01 RX ADMIN — MORPHINE SULFATE 4 MG: 4 INJECTION, SOLUTION INTRAMUSCULAR; INTRAVENOUS at 18:00

## 2019-01-01 RX ADMIN — ENOXAPARIN SODIUM 70 MG: 80 INJECTION SUBCUTANEOUS at 21:02

## 2019-01-01 RX ADMIN — KETOROLAC TROMETHAMINE 30 MG: 30 INJECTION, SOLUTION INTRAMUSCULAR; INTRAVENOUS at 13:06

## 2019-01-01 RX ADMIN — MORPHINE SULFATE 2 MG: 2 INJECTION, SOLUTION INTRAMUSCULAR; INTRAVENOUS at 20:17

## 2019-01-01 RX ADMIN — MORPHINE SULFATE 2 MG: 2 INJECTION, SOLUTION INTRAMUSCULAR; INTRAVENOUS at 05:55

## 2019-01-01 RX ADMIN — DEXTROSE MONOHYDRATE, SODIUM CHLORIDE, AND POTASSIUM CHLORIDE 75 ML/HR: 50; 4.5; 1.49 INJECTION, SOLUTION INTRAVENOUS at 09:29

## 2019-01-01 RX ADMIN — HALOPERIDOL LACTATE 5 MG: 5 INJECTION INTRAMUSCULAR at 23:11

## 2019-01-01 RX ADMIN — MORPHINE SULFATE 4 MG: 4 INJECTION, SOLUTION INTRAMUSCULAR; INTRAVENOUS at 06:40

## 2019-01-01 RX ADMIN — MORPHINE SULFATE 2 MG: 2 INJECTION, SOLUTION INTRAMUSCULAR; INTRAVENOUS at 16:15

## 2019-01-01 RX ADMIN — MORPHINE SULFATE 2 MG: 4 INJECTION, SOLUTION INTRAMUSCULAR; INTRAVENOUS at 11:54

## 2019-01-01 RX ADMIN — WATER 20 MG: 1 INJECTION INTRAMUSCULAR; INTRAVENOUS; SUBCUTANEOUS at 21:32

## 2019-01-01 RX ADMIN — DILTIAZEM HYDROCHLORIDE 7.5 MG/HR: 5 INJECTION INTRAVENOUS at 04:18

## 2019-01-01 RX ADMIN — DIPHENHYDRAMINE HYDROCHLORIDE 25 MG: 50 INJECTION INTRAMUSCULAR; INTRAVENOUS at 06:40

## 2019-01-01 RX ADMIN — MORPHINE SULFATE 2 MG: 2 INJECTION, SOLUTION INTRAMUSCULAR; INTRAVENOUS at 14:53

## 2019-01-18 NOTE — ED NOTES
Spoke to Aggie Meneses works at the building next door to the one the resident lives in. Unable to get  Hold of staff number tejal marie [phone]. Informed this nurse that someone from her building will meet the ambulance company at the front door. Darron Payne 150 Ambulance transportation.

## 2019-01-18 NOTE — ED PROVIDER NOTES
80 y.o. female with past medical history significant for Arthritis, Osteoporosis, Colon Cancer, Depression, High Cholesterol, Hypertension, Hematoma of breast, Umbilical hernia, Sebaceous carcinoma, and Dementia who presents via EMS from [facility] facility with chief complaint of altered mental status. History limited due to patient's baseline dementia. Per EMS, patient called EMS tonight for increased confusion. Per EMS, en route to [facility] ED patient's blood glucose 153. Patient presents to [facility] ED with no current pain or discomfort, and upon examination states she is \"confused why she is here. \" Patient has history of UTI, and per EMS the patient's facility suspects current UTI. Per medical records, patient has history of GLF with head trauma. Patient specifically denies fever, chills, nausea, vomiting, abdominal pain, SOB, or chest pain. PCP: Savanah Valle MD 
 
Note written by Anahi Kuhn, as dictated by Macho Irizarry MD 7:37 PM 
 
 
 
The history is provided by the patient, the EMS personnel and medical records. Past Medical History:  
Diagnosis Date  Arthritis  Arthritis Dr. Antonio Davalos  Cancer ([address]  Cancer ([facility]   
 colon CA in situ  Colon cancer ([facility] [address]  Dementia  Depression  Hematoma (nontraumatic) of breast   
 Dr. Stallings Said freeman breast surgeon  High cholesterol  Hypertension  Osteoporosis  Osteoporosis Dr. Caitlin Moulton fall fractured vertebrae  Psychiatric disorder  Psychiatric disorder   
 depression  Sebaceous carcinoma 2018  Umbilical hernia Past Surgical History:  
Procedure Laterality Date [address] [facility],[address]  HX HEENT    
 cateracts bilateral  
 HX HIP REPLACEMENT  2009 Right  HX ORTHOPAEDIC  2012 T11 & L1 vertebrae fx  HX PELVIC FRACTURE TX  2010 Family History:  
Problem Relation Age of Onset  Heart Disease Mother Social History Socioeconomic History  Marital status:  Spouse name: Not on file  Number of children: Not on file  Years of education: Not on file  Highest education level: Not on file Social Needs  Financial resource strain: Not on file  Food insecurity - worry: Not on file  Food insecurity - inability: Not on file  Transportation needs - medical: Not on file  Transportation needs - non-medical: Not on file Occupational History  Not on file Tobacco Use  Smoking status: Former Smoker Packs/day: 0.50 Last attempt to quit: 1970 Years since quittin.0  Smokeless tobacco: Never Used Substance and Sexual Activity  Alcohol use: Yes Comment: occasional  
 Drug use: No  
 Sexual activity: No  
Other Topics Concern  Not on file Social History Narrative ** Merged History Encounter ** [facility] Can cook if she wants 3 children 2 daughters and 1 son No smoke Drink- wine, or bourbon or vodka 1 ounce ALLERGIES: Latex; Codeine; Neosporin [benzalkonium chloride]; Sulfa (sulfonamide antibiotics); and Sulfa dyne Review of Systems Unable to perform ROS: Mental status change Psychiatric/Behavioral: Positive for confusion. Vitals:  
 19 Pulse: 90 Resp: 18 Temp: 97.6 °F (36.4 °C) SpO2: 97% Physical Exam  
Vital signs reviewed. Nursing notes reviewed. Const:  No acute distress, well developed, well nourished Head:  Atraumatic, normocephalic Eyes:  PERRL, conjunctiva normal, no scleral icterus Neck:  Supple, trachea midline Cardiovascular:  RRR, no murmurs, no gallops, no rubs Resp:  No resp distress, no increased work of breathing, no wheezes, no rhonchi, no rales, Abd:  Soft, non-tender, non-distended, no rebound, no guarding, no CVA tenderness :  Deferred MSK:  No pedal edema, normal ROM Neuro:  Alert and oriented x1, no cranial nerve defect Skin:  Warm, dry, intact Psych: normal mood and affect, behavior is normal, judgement and thought content is normal 
Note written by Anahi Chowdhury, as dictated by Dianna Wang MD 7:37 PM 
 
 
MDM Number of Diagnoses or Management Options Confusion:  
  
Amount and/or Complexity of Data Reviewed Clinical lab tests: ordered and reviewed Tests in the radiology section of CPT®: ordered and reviewed Review and summarize past medical records: yes Patient Progress Patient progress: stable Pt. Presents to the ER with self-reported confusion. Pt. Is well appearing in the ER. No signs of AMS. Awaiting her results. Pt. Signed out to Dr. Phill Kuo. Procedures

## 2019-01-18 NOTE — DISCHARGE INSTRUCTIONS
Patient Education        Learning About Delirium  What is delirium? Delirium is a sudden change in mental condition. It leads to confusion and unusual behavior. Delirium is also called acute confusional state. Delirium affects all age groups. It can result from problems that affect the brain, such as stroke. It can also happen after an infection or when using certain medicines. Pain may also cause the problem. Seeing delirium in a loved one can be scary and sad. But it will go away most of the time. It usually lasts hours to days. The doctor will look for a cause and take steps to treat it and keep your loved one comfortable. What are the symptoms? Symptoms of delirium usually develop over several hours to a few days. Symptoms may change and be more or less severe. Symptoms include:  · A short attention span. · Confusion. This is not knowing where you are, what time it is, or who others are. · Hallucinations. This usually is seeing or hearing things that are not really there. · Delusions. This is believing things that aren't true. · Illusions. This is making a mistake in what you think is real. For example, you think a child is crying, but it's a pillow. · Disorganized thinking. How is delirium treated? The doctor may:  · Find and treat the cause. This could be:  ? Not getting enough fluids. ? An infection. ? A medicine or combination of medicines. ? Another medical problem. · Prescribe a medicine. · Make the hospital room as quiet as possible. You may be able to help your loved one by being present and talking to and touching him or her. Follow-up care is a key part of your treatment and safety. Be sure to make and go to all appointments, and call your doctor if you are having problems. It's also a good idea to know your test results and keep a list of the medicines you take. Where can you learn more? Go to http://suzanne-abdi.info/.   Enter B461 in the search box to learn more about \"Learning About Delirium. \"  Current as of: September 11, 2018  Content Version: 11.9  © 9493-0335 [employer]. Care instructions adapted under license by [employer] (which disclaims liability or warranty for this information). If you have questions about a medical condition or this instruction, always ask your healthcare professional. [place] [address] any warranty or liability for your use of this information.

## 2019-01-18 NOTE — ED NOTES
Transportation has been arranged for patient going back to [facility] 54 30 minutes

## 2019-01-18 NOTE — ED NOTES
Patient is a resident of [address] [place] [address] per EMS patient called 911. She appeared to be confused per EMS. Staff reports she gets frequent UTI'S. Patient has dementia at baseline and is unsure of why she is in the ER. Family at bedside.

## 2019-01-18 NOTE — ED NOTES
Discharged by provider. Leaves with transport back to facility. Pt in no acute distress at time of discharge.

## 2019-05-30 PROBLEM — R06.02 SOB (SHORTNESS OF BREATH): Status: RESOLVED | Noted: 2017-08-15 | Resolved: 2019-01-01

## 2019-05-30 PROBLEM — E86.0 DEHYDRATION: Status: ACTIVE | Noted: 2019-01-01

## 2019-05-30 PROBLEM — C18.9 COLON CANCER (HCC): Status: ACTIVE | Noted: 2019-01-01

## 2019-05-30 PROBLEM — R09.02 HYPOXIA: Status: ACTIVE | Noted: 2019-01-01

## 2019-05-30 PROBLEM — R94.31 ABNORMAL EKG: Status: RESOLVED | Noted: 2017-08-15 | Resolved: 2019-01-01

## 2019-05-30 PROBLEM — R09.02 HYPOXIA: Status: RESOLVED | Noted: 2019-01-01 | Resolved: 2019-01-01

## 2019-05-30 PROBLEM — G93.41 ACUTE METABOLIC ENCEPHALOPATHY: Status: ACTIVE | Noted: 2019-01-01

## 2019-05-30 PROBLEM — S22.000A THORACIC COMPRESSION FRACTURE (HCC): Status: RESOLVED | Noted: 2017-02-21 | Resolved: 2019-01-01

## 2019-05-30 PROBLEM — S30.1XXA RECTUS SHEATH HEMATOMA: Status: RESOLVED | Noted: 2017-02-20 | Resolved: 2019-01-01

## 2019-05-30 PROBLEM — T14.8XXA MULTIPLE SKIN TEARS: Status: ACTIVE | Noted: 2019-01-01

## 2019-05-30 PROBLEM — T14.8XXA MULTIPLE SKIN TEARS: Status: RESOLVED | Noted: 2019-01-01 | Resolved: 2019-01-01

## 2019-05-30 PROBLEM — F03.90 DEMENTIA (HCC): Status: ACTIVE | Noted: 2019-01-01

## 2019-05-30 PROBLEM — I48.91 ATRIAL FIBRILLATION (HCC): Status: ACTIVE | Noted: 2019-01-01

## 2019-05-30 NOTE — ED NOTES
Pt continuing to scream and pull on lines. Unable to redirect pt. Was informed by CT that pt was combative during her head CT. Mitts were placed on pt. Pt's daughter verbalized understanding that pt will require mitts to prevent from pulling on lines and IV.

## 2019-05-30 NOTE — ED PROVIDER NOTES
80 y.o. female with past medical history significant for Arthritis, Osteoporosis, Colon Cancer, Depression, Hypertension, High cholesterol, Umbilical hernia, Dementia who presents via EMS from [address] [facility] [address] with chief complaint of AMS. Per daughter, the patient has had intermittent AMS for a few weeks predominantly at night; however, over the past two days the patient has had AMS during the day with associated agitation and confusion. Patient presents to [facility] ED today with persisting AMS with confusion and agitation. Patient has accompanying multiple old skin tears to lateral aspect of the left leg that are dressed, and per daughter, one of which became infected ~10 days ago and pt was placed on Keflex. Per daughter, patient is alert and oriented at baseline. Per daughter, the patient completed UA ~3 weeks ago that was unremarkable. Patient does not take blood thinner. Patient has history of COPD and A-fib \"one time. \" Of note, patient interviewed to be placed in long term care facility two days ago. Patient is DNR. PCP: Ernie Purdy MD    Full history, physical exam, and ROS unable to be obtained due to:  AMS. Note written by Anahi Liz, as dictated by Carmelo Moore MD 2:56 PM      The history is provided by a relative. The history is limited by the condition of the patient.         Past Medical History:   Diagnosis Date    Arthritis     Arthritis     Dr. Rangel Mack [facility])    Lindsey Gaucher [facility])     colon CA in situ    Colon cancer ([facility]     Dementia     Depression     Hematoma (nontraumatic) of breast     Dr. Philly freeman breast surgeon    High cholesterol     Hypertension     Osteoporosis     Osteoporosis     Dr. Denys Maya fall fractured vertebrae    Psychiatric disorder     Psychiatric disorder     depression    Sebaceous carcinoma 8486    Umbilical hernia        Past Surgical History:   Procedure Laterality Date    HX COLECTOMY  1985    HX HEENT cateracts bilateral    HX HIP REPLACEMENT  2009    Right    HX ORTHOPAEDIC  2012    T11 & L1 vertebrae fx    HX PELVIC FRACTURE TX  2010         Family History:   Problem Relation Age of Onset    Heart Disease Mother        Social History     Socioeconomic History    Marital status:      Spouse name: Not on file    Number of children: Not on file    Years of education: Not on file    Highest education level: Not on file   Occupational History    Not on file   Social Needs    Financial resource strain: Not on file    Food insecurity:     Worry: Not on file     Inability: Not on file    Transportation needs:     Medical: Not on file     Non-medical: Not on file   Tobacco Use    Smoking status: Former Smoker     Packs/day: 0.50     Last attempt to quit: 1970     Years since quittin.4    Smokeless tobacco: Never Used   Substance and Sexual Activity    Alcohol use: No     Frequency: Never    Drug use: No    Sexual activity: Never   Lifestyle    Physical activity:     Days per week: Not on file     Minutes per session: Not on file    Stress: Not on file   Relationships    Social connections:     Talks on phone: Not on file     Gets together: Not on file     Attends Mandaen service: Not on file     Active member of club or organization: Not on file     Attends meetings of clubs or organizations: Not on file     Relationship status: Not on file    Intimate partner violence:     Fear of current or ex partner: Not on file     Emotionally abused: Not on file     Physically abused: Not on file     Forced sexual activity: Not on file   Other Topics Concern    Not on file   Social History Narrative    ** Merged History Encounter **         [facility], Independent Living    Can cook if she wants        3 children    2 daughters and 1 son        No smoke        Drink- wine, or bourbon or vodka 1 ounce         ALLERGIES: Latex; Codeine; Neosporin [benzalkonium chloride];  Sulfa (sulfonamide antibiotics); and Sulfa dyne    Review of Systems   Unable to perform ROS: Mental status change   Psychiatric/Behavioral: Positive for behavioral problems and confusion. Vitals:    05/30/19 1440   BP: 163/86   Pulse: (!) 117   Resp: 22   Temp: 99.6 °F (37.6 °C)   SpO2: 95%   Weight: 74.8 kg (165 lb)   Height: 5' 4\" (1.626 m)            Physical Exam   Constitutional: She appears well-nourished. Elderly   HENT:   Head: Normocephalic and atraumatic. Right Ear: Tympanic membrane normal.   Left Ear: Tympanic membrane normal.   Eyes: No scleral icterus. Neck: No tracheal deviation present. Cardiovascular: An irregular rhythm present. Tachycardia present. Pulmonary/Chest: Effort normal. No respiratory distress. Rales in right lower base   Abdominal: She exhibits no distension. Musculoskeletal: She exhibits no deformity. Neurological: She is alert. Skin: Skin is dry. Skin tear left leg with erythema and purulent exudate   Psychiatric: She is agitated. Confused   Note written by Anahi Dao, as dictated by Kandy Oliveira MD 2:55 PM       MDM  Number of Diagnoses or Management Options  Altered mental status, unspecified altered mental status type:   Atrial fibrillation with RVR [facility]):   Cellulitis of right lower extremity:   Diagnosis management comments: 51-year-old female with a history of colon cancer, hypertension, hyperlipidemia presents with altered mental status. - EKG shows atrial fibrillation with RVR at rate of 106, right bundle branch block, left axis deviation, left anterior fascicular block and, no acute ischemia or infarction. Compared to September 7, 2018 A. fib is new.  -Labs unremarkable.   - CT scan of the head, chest, abdomen showed no acute findings.  -Cardizem drip  - Lovenox  - Unasyn and vancomycin for cellulitis  - Admit to hospitalist.    Critical Care  Total time providing critical care: (Total critical care time spent exclusive of procedures: 35 minutes  )         Procedures      Angel Benavidez MD

## 2019-05-31 NOTE — PROGRESS NOTES
Occupational Therapy  Order received and chart reviewed, attempted to see however RN reporting patient not appropriate at this time as she does not follow any commands, checked on patient who is in bilateral hand mitts and sleeping, noted blood pressure 89/48, RN informed. Will follow up on Monday.  Thank you,      Jeffrey Andino, OTR/L

## 2019-05-31 NOTE — CONSULTS
Sandi Cruz MD [facility] [address]  Office [phone]  Mobile [phone]    Date of  Admission: 5/30/2019  2:32 PM  PCP- Cruz Cleveland MD    Michelle Kong is a 80 y.o. female admitted for Atrial fibrillation with RVR ([facility] [address] [I48.91]. Consult requested by Zaria Lyons MD    Assessment  · AF with RVR, newly discovered. Unknown duration and asx. Unclear trigger  · Acute encephalopathy  · Dementia  · Very elderly  · DNR        Discussion/Recommendations:  Utilize iv diltiazem for rate control until encephalopathy clears, then oral diltiazem or beta blocker  Would NOT anticoagulate due to high risk of bleeding    Long discussion with patient's daughter    Subjective:  Patient known to me from office visit 2 years ago for question of AF but not firmly documented. No further workup done at that time. Admitted with delirium/encephalopathy, new onset AF with RVR. Workup thus far in terms of labs/CT imaging negative. Unable to obtain hx from her due to delirium, daughter at bedside. Confusion began several days ago. A month ago she was \"fine\" - baseline wheelchair bound with some short memory issues but otherwise okay. Persistent AF on tele with HR 90s on iv diltiazem. Troponin negative. Echo reviewed - very difficult study but overall LV function is normal without obvious valvular disease.      Nonsmoker/drinker      Past Medical History:   Diagnosis Date    Arthritis     Arthritis     Dr. Mena Garcia [facility])    Hermina Elders [facility])     colon CA in situ    Colon cancer ([facility] [address] [facility]     Dementia     Depression     Hematoma (nontraumatic) of breast     Dr. Chad freeman breast surgeon    High cholesterol     Hypertension     Osteoporosis     Osteoporosis     Dr. Farrell Goldmann fall fractured vertebrae    Psychiatric disorder     Psychiatric disorder     depression    Sebaceous carcinoma 2174    Umbilical hernia       Past Surgical History:   Procedure Laterality Date    HX COLECTOMY  1985    HX HEENT      cateracts bilateral    HX HIP REPLACEMENT  2009    Right    HX ORTHOPAEDIC  2012    T11 & L1 vertebrae fx    HX PELVIC FRACTURE TX  2010     No current facility-administered medications on file prior to encounter. Current Outpatient Medications on File Prior to Encounter   Medication Sig Dispense Refill    albuterol (PROVENTIL VENTOLIN) 2.5 mg /3 mL (0.083 %) nebulizer solution 2.5 mg by Nebulization route every four (4) hours as needed for Wheezing.  cetirizine (ZYRTEC) 10 mg tablet Take 10 mg by mouth daily.  cephALEXin (KEFLEX) 500 mg capsule Take 500 mg by mouth four (4) times daily.  budesonide (PULMICORT) 0.5 mg/2 mL nbsp 500 mcg by Nebulization route every six (6) hours.  Flaxseed Oil oil Take 1,400 mg by mouth daily. Indications: Eczema      lanolin alcohol-mineral oil-petrolatum (EUCERIN) topical cream Apply  to affected area three (3) times daily. Apply all over body specifically arms, legs, back, and chest for eczema      LORazepam (ATIVAN) 0.5 mg tablet Take 0.25 mg by mouth nightly.  memantine (NAMENDA) 10 mg tablet Take 10 mg by mouth daily.  mirabegron ER (MYRBETRIQ) 25 mg ER tablet Take 25 mg by mouth daily.  predniSONE (DELTASONE) 20 mg tablet Take 20 mg by mouth daily (with breakfast).  QUEtiapine (SEROQUEL) 25 mg tablet Take 25 mg by mouth two (2) times a day.  raNITIdine (ZANTAC) 150 mg tablet Take 150 mg by mouth two (2) times a day.  traZODone (DESYREL) 150 mg tablet Take 150 mg by mouth nightly.  albuterol (PROVENTIL VENTOLIN) 2.5 mg /3 mL (0.083 %) nebulizer solution 2.5 mg by Nebulization route three (3) times daily.  morphine (ROXANOL) 100 mg/5 mL (20 mg/mL) concentrated solution Take 10 mg by mouth every two (2) hours as needed for Pain (SOB or pain).       LORazepam (INTENSOL) 2 mg/mL concentrated solution Take 0.5 mg by mouth every four (4) hours as needed for Anxiety (anxiety, agitation, body movements).  montelukast (SINGULAIR) 10 mg tablet Take 1 Tab by mouth daily. 30 Tab 1    acetaminophen (TYLENOL EXTRA STRENGTH) 500 mg tablet Take 2 Tabs by mouth every six (6) hours as needed for Pain. 30 Tab 0    guaiFENesin ER (MUCINEX) 600 mg ER tablet Take 1 Tab by mouth two (2) times a day. Follow with 6 oz of water 60 Tab 1    diphenhydrAMINE (BENADRYL ALLERGY) 12.5 mg/5 mL syrup Take 10 mL by mouth nightly as needed. Indications: Urticaria (Patient taking differently: Take 25 mg by mouth nightly as needed for Other (dermatitis). Indications: Hives) 236 mL 0    fluticasone (FLONASE) 50 mcg/actuation nasal spray 2 Sprays by Both Nostrils route daily. 1 Bottle 3    traMADol (ULTRAM) 50 mg tablet Take 1 Tab by mouth every six (6) hours as needed for Pain. Max Daily Amount: 200 mg. 30 Tab 0    therapeutic multivitamin (THERAGRAN) tablet Take 1 Tab by mouth two (2) times a day.  escitalopram oxalate (LEXAPRO) 10 mg tablet Take 1 Tab by mouth daily. 90 Tab 1    Cholecalciferol, Vitamin D3, 1,000 unit cap Take 1 capsule by mouth daily 90 Cap 1     Allergies   Allergen Reactions    Latex Unknown (comments)     Pt unable to report      Codeine Unknown (comments)    Neosporin [Benzalkonium Chloride] Rash    Sulfa (Sulfonamide Antibiotics) Unknown (comments)    Sulfa Dyne Other (comments)     \"Crying jag\"             Review of Symptoms:    Other systems reviewed and negative except as above.     Physical Exam    Visit Vitals  /87 (BP 1 Location: Left arm, BP Patient Position: At rest;Supine)   Pulse 100   Temp 98.2 °F (36.8 °C)   Resp 16   Ht 5' 4\" (1.626 m)   Wt 165 lb (74.8 kg)   SpO2 95%   BMI 28.32 kg/m²     Visit Vitals  /87 (BP 1 Location: Left arm, BP Patient Position: At rest;Supine)   Pulse 100   Temp 98.2 °F (36.8 °C)   Resp 16   Ht 5' 4\" (1.626 m)   Wt 165 lb (74.8 kg)   SpO2 95%   BMI 28.32 kg/m²     General Appearance: Elderly WF, delirious, in padded bed with hand muffs   Ears/Nose/Mouth/Throat:   wnl         Neck: Supple. Chest:   Lungs clear to auscultation bilaterally. Cardiovascular:  Irregular rate and rhythm, S1, S2 normal, no murmur. Abdomen:   Soft, non-tender, bowel sounds are active. Extremities: No edema bilaterally. Skin: Warm and dry.                Cardiographics    Telemetry: AFIB/90s  ECG: atrial fibrillation, rate 120s, RBBB      Recent Results (from the past 12 hour(s))   ECHO ADULT COMPLETE    Collection Time: 05/31/19 10:53 AM   Result Value Ref Range    LVOT d 2.07 cm    LVIDs (M-mode) 2.51 cm    LVIDd (M-mode) 4.29 cm    LVPWd (M-mode) 1.40 (A) cm    IVSd (M-mode) 1.33 (A) cm    Est. RA Pressure 3.0 mmHg    Pulmonic Valve Max Velocity 93.06 cm/s    Ao Root D 3.36 cm    PV peak gradient 3.5 mmHg

## 2019-05-31 NOTE — PROCEDURES
Name:   Madison Alfred  YOB: 1924  MRN:   [id number]           Procedure:   EEG     Location:   [place] [address]  Date of Recordin19  Date of Interpretation:    19    Interpreting physician: Alice Wood MD  Requesting provider:   As above      Indication: 80 y.o. female with Dementia (dx 3-4yrs ago), some decline starting around 2018, then last 2 months with noticeable decline ending with severe agitation and behavioral chagnes over last few days. EEG is being done to rule out subclinical seizure. Current medications: has a current medication list which includes the following prescription(s): albuterol, cetirizine, cephalexin, budesonide, flaxseed oil, lanolin alcohol-mineral oil-petrolatum, lorazepam, memantine, mirabegron er, prednisone, quetiapine, ranitidine, trazodone, albuterol, morphine, lorazepam, montelukast, acetaminophen, guaifenesin er, diphenhydramine, fluticasone propionate, tramadol, therapeutic multivitamin, escitalopram oxalate, and cholecalciferol, and the following Facility-Administered Medications: cephalexin, enoxaparin, morphine, lorazepam, sodium chloride, dilTIAZem (CARDIZEM) 125 mg in dextrose 5% 125 mL infusion, escitalopram oxalate, fluticasone propionate, dextrose 5% - 0.45% nacl with kcl 20 meq/l, haloperidol lactate, acetaminophen, diphenhydramine, ondansetron, budesonide, memantine, mirabegron er, montelukast, morphine, prednisone, quetiapine, and famotidine. Technical:   Digital EEG recorded in 10-20 international placement system, multiple montages    Interpretation:   Patient level of alertness: agitated, lethargic  Background pattern: symmetric, motion/ movement artifact. Posterior dominant rhythm: 4-6 Hz. Photic stimulation and HV could not be done due to pt agitation and lethargy. Drowsiness recorded: no.  Sleep recorded: no.  Single lead EKG: no abnormalities. Areas of focal slowing: none.   Epileptiform discharges: none. Electrographic seizures: none. Impression: This is abnormal EEG consisting of mild to moderate generalized slowing consistent with an encephalopathy, though not specific as to cause. Potential causes include toxic, metabolic, infectious, degenerative processes or medication side effects. There were no epileptiform discharges or subclinical seizures during this recording. Clinical correlation is necessary.         Camila Galvan MD  [facility]

## 2019-05-31 NOTE — PROGRESS NOTES
Spiritual Care Assessment/Progress Note  [address]      NAME: Madison Alfred      MRN: [id number]  AGE: 80 y.o. SEX: female  U[place] Affiliation: Taoist   Language: English     5/31/2019     Total Time (in minutes): 6     Spiritual Assessment begun in SF 3 PRO CARE TELE 2 through conversation with:         []Patient        [] Family    [] Friend(s)        Reason for Consult: Palliative Care, Initial/Spiritual Assessment     Spiritual beliefs: (Please include comment if needed)     [] Identifies with a nidia tradition:         [] Supported by a nidia community:            [] Claims no spiritual orientation:           [] Seeking spiritual identity:                [] Adheres to an individual form of spirituality:           [x] Not able to assess:                           Identified resources for coping:      [] Prayer                               [] Music                  [] Guided Imagery     [] Family/friends                 [] Pet visits     [] Devotional reading                         [] Unknown     [] Other:                                             Interventions offered during this visit: (See comments for more details)    Patient Interventions: Initial visit(Attempted)           Plan of Care:     [] Support spiritual and/or cultural needs    [] Support AMD and/or advance care planning process      [] Support grieving process   [] Coordinate Rites and/or Rituals    [] Coordination with community clergy   [] No spiritual needs identified at this time   [] Detailed Plan of Care below (See Comments)  [] Make referral to Music Therapy  [] Make referral to Pet Therapy     [] Make referral to Addiction services  [] Make referral to [facility]  [] Make referral to Spiritual Care Partner  [] No future visits requested        [x] Follow up visits as needed     Comments: Attempted Initial Palliative Care spiritual assessment in [facility]. Staff was working with Miss Candi Rosas including [address]. Unable to assess at this time. Chaplains will continue to follow as able and/or needed.   Visited by: Leatha Kaba., MS., [address] ([id number])

## 2019-05-31 NOTE — PROGRESS NOTES
Advance Care Planning Note    Name: Thelma Albert  YOB: 1924  MRN: [id number]  Admission Date: 5/30/2019  2:32 PM    Date of discussion: 5/30/2019    Active Diagnoses:    Hospital Problems  Date Reviewed: 5/30/2019           Colon cancer ([facility]   Dementia   * (Principal) Atrial fibrillation with RVR ([facility]   Dehydration   Acute metabolic encephalopathy   Advanced care planning/counseling discussion   Depression   High cholesterol   Hypertension           These active diagnoses are of sufficient risk that focused discussion on advance care planning is indicated in order to allow the patient to thoughtfully consider personal goals of care, and if situations arise that prevent the ability to personally give input, to ensure appropriate representation of their personal desires for different levels and aggressiveness of care. Discussion:     Persons present and participating in discussion: Thelma AlbertElmer MD, daughter and ALCIRA. Discussion: dementia treatment and prognosis in setting of Afib. Daughter is POA. She has prepared AD and DNR documents. Patient is DNR. Time Spent:     Total time spent face-to-face in education and discussion: 25 minutes.      Elmer Win MD  5/30/2019  10:04 PM

## 2019-05-31 NOTE — ED NOTES
Patient Throughput:  Charge nurse on [facility] made aware of patient's room assignment, room An [facility] 49, [address] Resource Nurse  Emergency Department

## 2019-05-31 NOTE — ED NOTES
Pt con't moaning loudly, incomprehensible speech, does not follow commands  Restless in bed, attempting to bite at mitts, and picking in the air

## 2019-05-31 NOTE — ROUTINE PROCESS
TRANSFER - OUT REPORT:    Verbal report given to April(name) on Madison Alfred  being transferred to [facility](unit) for routine progression of care       Report consisted of patients Situation, Background, Assessment and   Recommendations(SBAR). Information from the following report(s) SBAR, Kardex, ED Summary, [facility] and Recent Results was reviewed with the receiving nurse. Lines:   Peripheral IV 05/30/19 Right Forearm (Active)   Site Assessment Clean, dry, & intact 5/30/2019  4:53 PM   Phlebitis Assessment 0 5/30/2019  4:53 PM   Infiltration Assessment 0 5/30/2019  4:53 PM   Dressing Status Clean, dry, & intact 5/30/2019  4:53 PM   Hub Color/Line Status Pink 5/30/2019  4:53 PM        Opportunity for questions and clarification was provided.       Patient transported with:   Monitor  Registered Nurse

## 2019-05-31 NOTE — ED NOTES
Pt yelling/moaning loudly  Pt does not follow commands, currently attempting to bite through soft mitt restraints  Pt was repositioned onto left side, however pt then rolled herself back onto her right side  Dressing to LLE and L elbow have slide down, attempted to re-dress wounds  Seizure pads readjusted at this time

## 2019-05-31 NOTE — ACP (ADVANCE CARE PLANNING)
Primary Decision Maker: Faith Moser - Daughter - [phone]    Primary Decision Maker: Jeniffer Long - Daughter    Primary Decision Maker: Janice Torrez., [address] Planning 5/31/2019   Patient's Healthcare Decision Maker is: Named in scanned ACP document   Confirm Advance Directive Yes, on file   Does the patient have other document types Do Not Resuscitate     Pt has AMD on file which appoints children Ney Kan, Negro West, and Hemant Sommers as joint medical POAs, all of whom must agree on decisions. Pt also has DDNR in place, copy has been scanned into medical record.

## 2019-05-31 NOTE — ED NOTES
Pt moved to hospital bed, side rails padded  Pt con't to moan loudly, attempted to reposition pt for comfort

## 2019-05-31 NOTE — ED NOTES
Daughter leaving at this time, daughter extremely upset at this time due to pt not having a bed assignment by this time, daughter remains upset even after speaking with charge nurse, daughter requesting this RN name as well as charge RN name  Any Tracy, RN Supervisor stated that she would speak with family about bed assignment for pt, however family left prior to speaking with her  Pt becoming restless and agitated, yelling/moaning

## 2019-05-31 NOTE — PROGRESS NOTES
5- Reason for Admission:   Agitation                 11  RRAT Score:                     Plan for utilizing home health:  No                        Current Advanced Directive/Advance Care Plan: DNR    Likelihood of Readmission:  Moderate                         Transition of Care Plan:   TBD    I met with the pt's daughter, Jorge Santizo ([phone]), to determine potential discharge needs. The pt has been a resident of [facility] for several years but has now become wheelchair bound and has had increased confusion for several months. I spoke with Christopher Mccormack at [facility] and they will have to see the pt prior to accepting her back and aslo said she would have to be able to stand and pivot. The daughter said the pt had also been under the care of [facility] the past 2-3 months but that was revoked when the pt came to the hospital. At this time, due to the pt's complex medical status, we will await making any decision regarding post-hospital care/facility until she hopefully shows some sign of improvement.  CM will continue to follow and assist.    Care Management Interventions  PCP Verified by CM: Yes(Dr. Abiodun Guillaume navigator notified)  Discharge Durable Medical Equipment: No(Has a rollator, wheelchair and bedside commode)  Physical Therapy Consult: Yes  Occupational Therapy Consult: Yes  Current Support Network: Assisted Living  Plan discussed with Pt/Family/Caregiver: Yes  Discharge Location  Discharge Placement: (TBD)    FRANCE Okeefe, CM

## 2019-05-31 NOTE — ED NOTES
This RN received a phone call from pt's daughter, Kamron Oliva, who was upset that pt \"did not get a bed last night. \" this RN questioned daughter as to what she meant either a hospital room or a hospital bed then daughter stated \"no. A bed in the parking lot, of course the hospital\" then this RN attempted to inform daughter that pt was still in ED, we placed her on a hospital bed, but did not have a hospital room at this time  Daughter stated \"I'm really, really angry that she is still in the Emergency Room. \" when this RN attempted to explain admission process to daughter, she stated \"thanks for the really great information. \" and hung the phone up on this RN before completing a sentence.

## 2019-05-31 NOTE — PROGRESS NOTES
[facility] Dosing Services: *    The following medication: keflex was automatically dose-adjusted per [facility] P&T Committee Protocol, with respect to renal function. Consult provided for this   80 y.o. , female , for the indication of SSTI. Pt Weight:   Wt Readings from Last 1 Encounters:   05/30/19 74.8 kg (165 lb)         Previous Regimen 500 mg q 6 h    Serum Creatinine Lab Results   Component Value Date/Time    Creatinine 1.01 05/30/2019 03:24 PM         Creatinine Clearance Estimated Creatinine Clearance: 33.7 mL/min (based on SCr of 1.01 mg/dL). BUN Lab Results   Component Value Date/Time    BUN 30 (H) 05/30/2019 03:24 PM         Dosage changed to:  500 mg q 8 h     Additional notes:      Pharmacy to continue to monitor patient daily. Will make dosage adjustments based upon changing renal function.   Signed Anayeli Wynne, PHARMD. Contact information:  [phone]

## 2019-05-31 NOTE — ED NOTES
Pt with soiled linens  Pt cleaned, gowns and linens changed, new diaper placed on pt at daughter's request  Attempted to reposition pt, however pt returned to same position lying on right side  Pt with multiple skin tears to bilateral lower legs, no bleeding noted, dressing applied to LLE  Dressing applied to skin tear to L elbow  Pt remains in soft mittens, pt attempting to bite off mittens

## 2019-05-31 NOTE — ED NOTES
Pt very restless, con't to attempt to bite at soft mittens, yelling intermittently incomprehensible words  This RN explained to daughter that the MD has ordered additional labs which would possibly require a venipuncture, daughter agreeable to drawing off IV, however IV does not draw at this time, pt too restless and agitated for venipuncture at this time and daughter refused a lab draw at this time  This RN informed daughter that pt was order Seroquel by mouth by MD, however daughter does not feel the pt is able to swallow pills at this time, this RN agrees with daughter  Will notify MD

## 2019-05-31 NOTE — H&P
[facility]    Hospitalist Admission Note      NAME:  Gokul Jara   :   1924   MRN:  [id number]     PCP:  Leoncio Singh MD     Date/Time:  2019 8:31 PM          Subjective:     CHIEF COMPLAINT: agitation     HISTORY OF PRESENT ILLNESS:     Ms. Erica House is a 80 y.o.  female who presented to the Emergency Department complaining of agitation. She provides no hx. Family provides hx. Patient with 2+ months of worsening confusion, agitation and aggression. Nighttime terrors and hallucinations. Has not walked in 2 years due to weakness and debility, used walker prior to that. Never seen by neurology, but given Dx dementia by PCP years ago. ER finds Afib with RVR.       Past Medical History:   Diagnosis Date    Arthritis     Arthritis     Dr. Jeremy Sofia [facility])    [address] [facility]     colon CA in situ    Colon cancer ([facility]     Dementia     Depression     Hematoma (nontraumatic) of breast     Dr. Ross Kwok freeman breast surgeon    High cholesterol     Hypertension     Osteoporosis     Osteoporosis     Dr. 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    Umbilical hernia         Past Surgical History:   Procedure Laterality Date    HX COLECTOMY  1985    HX HEENT      cateracts bilateral    HX HIP REPLACEMENT  2009    Right    HX ORTHOPAEDIC  2012    T11 & L1 vertebrae fx    HX PELVIC FRACTURE TX  2010       Social History     Tobacco Use    Smoking status: Former Smoker     Packs/day: 0.50     Last attempt to quit: 1970     Years since quittin.4    Smokeless tobacco: Never Used   Substance Use Topics    Alcohol use: No     Frequency: Never        Family History   Problem Relation Age of Onset    Heart Disease Mother       Family hx cannot be fully assessed, due to the admitting conditions    Allergies   Allergen Reactions    Latex Unknown (comments) Pt unable to report      Codeine Unknown (comments)    Neosporin [Benzalkonium Chloride] Rash    Sulfa (Sulfonamide Antibiotics) Unknown (comments)    Sulfa Dyne Other (comments)     \"Crying jag\"        Prior to Admission medications    Medication Sig Start Date End Date Taking? Authorizing Provider   albuterol (PROVENTIL VENTOLIN) 2.5 mg /3 mL (0.083 %) nebulizer solution 2.5 mg by Nebulization route every four (4) hours as needed for Wheezing. Yes Provider, Historical   cetirizine (ZYRTEC) 10 mg tablet Take 10 mg by mouth daily. Yes Provider, Historical   cephALEXin (KEFLEX) 500 mg capsule Take 500 mg by mouth four (4) times daily. Yes Provider, Historical   budesonide (PULMICORT) 0.5 mg/2 mL nbsp 500 mcg by Nebulization route every six (6) hours. Yes Provider, Historical   Flaxseed Oil oil Take 1,400 mg by mouth daily. Indications: Eczema   Yes Provider, Historical   lanolin alcohol-mineral oil-petrolatum (EUCERIN) topical cream Apply  to affected area three (3) times daily. Apply all over body specifically arms, legs, back, and chest for eczema   Yes Provider, Historical   LORazepam (ATIVAN) 0.5 mg tablet Take 0.25 mg by mouth nightly. Yes Provider, Historical   memantine (NAMENDA) 10 mg tablet Take 10 mg by mouth daily. Yes Provider, Historical   mirabegron ER (MYRBETRIQ) 25 mg ER tablet Take 25 mg by mouth daily. Yes Provider, Historical   predniSONE (DELTASONE) 20 mg tablet Take 20 mg by mouth daily (with breakfast). Yes Provider, Historical   QUEtiapine (SEROQUEL) 25 mg tablet Take 25 mg by mouth two (2) times a day. Yes Provider, Historical   raNITIdine (ZANTAC) 150 mg tablet Take 150 mg by mouth two (2) times a day. Yes Provider, Historical   traZODone (DESYREL) 150 mg tablet Take 150 mg by mouth nightly. Yes Provider, Historical   albuterol (PROVENTIL VENTOLIN) 2.5 mg /3 mL (0.083 %) nebulizer solution 2.5 mg by Nebulization route three (3) times daily.    Yes Provider, Historical morphine (ROXANOL) 100 mg/5 mL (20 mg/mL) concentrated solution Take 10 mg by mouth every two (2) hours as needed for Pain (SOB or pain). Yes Provider, Historical   LORazepam (INTENSOL) 2 mg/mL concentrated solution Take 0.5 mg by mouth every four (4) hours as needed for Anxiety (anxiety, agitation, body movements). Yes Provider, Historical   montelukast (SINGULAIR) 10 mg tablet Take 1 Tab by mouth daily. 8/28/18  Yes Jadiel Wellington MD   acetaminophen (TYLENOL EXTRA STRENGTH) 500 mg tablet Take 2 Tabs by mouth every six (6) hours as needed for Pain. 8/22/18  Yes Louise Leal MD   guaiFENesin ER (MUCINEX) 600 mg ER tablet Take 1 Tab by mouth two (2) times a day. Follow with 6 oz of water 11/9/17  Yes Jadiel Wellington MD   diphenhydrAMINE (BENADRYL ALLERGY) 12.5 mg/5 mL syrup Take 10 mL by mouth nightly as needed. Indications: Urticaria  Patient taking differently: Take 25 mg by mouth nightly as needed for Other (dermatitis). Indications: Hives 10/19/17  Yes Jadiel Wellington MD   fluticasone (FLONASE) 50 mcg/actuation nasal spray 2 Sprays by Both Nostrils route daily. 6/15/17  Yes Jadiel Wellington MD   traMADol (ULTRAM) 50 mg tablet Take 1 Tab by mouth every six (6) hours as needed for Pain. Max Daily Amount: 200 mg. 2/21/17  Yes Shannen Henley MD   therapeutic multivitamin [facility]) tablet Take 1 Tab by mouth two (2) times a day. Yes Provider, Historical   escitalopram oxalate (LEXAPRO) 10 mg tablet Take 1 Tab by mouth daily. 7/22/16  Yes Yazan Suazo MD   Cholecalciferol, Vitamin D3, 1,000 unit cap Take 1 capsule by mouth daily 12/4/15  Yes Yazan Suazo MD       Review of Systems:  Indirect from daughter  (bold if positive, if negative)    Gen:  Eyes:  ENT:  CVS:  Pulm:  GI:  :  MS:  Skin:  Psych:   Insomnia, depression, anxietyEndo:  Hem:  Renal:  Neuro:        Objective:      VITALS:    Vital signs reviewed; most recent are:    Visit Vitals  BP (!) 108/36   Pulse 82   Temp 98.8 °F (37.1 °C)   Resp 14   Ht 5' 4\" (1.626 m)   Wt 74.8 kg (165 lb)   SpO2 93%   BMI 28.32 kg/m²     SpO2 Readings from Last 6 Encounters:   05/30/19 93%   01/17/19 93%   09/13/18 96%   09/07/18 98%   09/01/18 97%   08/28/18 97%    O2 Flow Rate (L/min): 2 l/min   No intake or output data in the 24 hours ending 05/30/19 2107     Exam:     Physical Exam:    Gen:  Well-developed, well-nourished, in psychological acute distress  HEENT:  Pink conjunctivae, PERRL, hearing intact to voice, moist mucous membranes  Neck:  Supple, without masses, thyroid non-tender  Resp:  No accessory muscle use, clear breath sounds without wheezes rales or rhonchi  Card:  No murmurs, irregular S1, S2 without thrills, bruits or peripheral edema  Abd:  Soft, non-tender, non-distended, normoactive bowel sounds are present, no mass  Lymph:  No cervical or inguinal adenopathy  Musc:  No cyanosis or clubbing  Skin:  No rashes or ulcers, skin turgor is good  Neuro:  Cranial nerves are grossly intact, no focal motor weakness, does not follow commands   Psych:  Poor insight, not oriented, agitated, with mitts     Labs:    Recent Labs     05/30/19  1524   WBC 9.9   HGB 13.5   HCT 42.9        Recent Labs     05/30/19  1524      K 3.8   *   CO2 26   *   BUN 30*   CREA 1.01   CA 9.1   ALB 3.2*   TBILI 0.4   SGOT 17   ALT 22     Lab Results   Component Value Date/Time    Glucose (POC) 119 (H) 05/30/2019 02:56 PM     No results for input(s): PH, PCO2, PO2, HCO3, FIO2 in the last 72 hours. No results for input(s): INR in the last 72 hours.     No lab exists for component: [place]  All Micro Results     Procedure Component Value Units Date/Time    URINE CULTURE HOLD SAMPLE [[id number]] Collected:  05/30/19 1638    Order Status:  Canceled Specimen:  Urine from Serum Updated:  05/30/19 164    CULTURE, BLOOD [[id number]] Collected:  05/30/19 1537    Order Status:  Completed Specimen: Blood Updated:  05/30/19 1618    CULTURE, BLOOD [[id number]] Collected:  05/30/19 1524    Order Status:  Completed Specimen:  Blood Updated:  05/30/19 1616          I have reviewed previous records       Assessment and Plan:      Atrial fibrillation with RVR / Hypertension - POA, likely prior hx of paroxysmal afib. Triggered by agitation. Check TSH. ECHO. Consult cardiology. Diltiazem drip. Dementia is too great to consider anticoagulation, and she may not even reliably take meds. Acute metabolic encephalopathy / Dementia / Depression - POA, worsening with up and downs over months. Severe agitation and violence at worst.  Her LTC residence is asking aleksandar to move her away. I think she has end stage dementia, rapidly progression. Consult neurology and palliative care. Continue lexatpro, memantine, seroquel, trazodone and added haldol. Bed rail pads and mitts for safety. She has acces to morphine and lorazepam for comfort, but those may worsen confusion. Dehydration - POA due to poor intake. Hydrate. COPD - continue Singulair, Flonase, Pulmicort and albuterol for comfort. High cholesterol - stop all testing or treatment. Hx Colon cancer - stop all testing or treatment. Telemetry reviewed:   AFIB    Risk of deterioration: high      Total time spent with patient: 79 Minutes I reviewed chart, notes, data and current medications in the medical record. I have examined and treated the patient at bedside during this period.                  Care Plan discussed with: Patient, Family, Nursing Staff and >50% of time spent in counseling and coordination of care    Discussed:  Care Plan       ___________________________________________________    Attending Physician: Amalia Boyd MD

## 2019-05-31 NOTE — CONSULTS
Palliative Medicine Consult  [place]: [phone])    Patient Name: Jerlene Goodpasture  YOB: 1924    Date of Initial Consult: 05/31/2019  Reason for Consult: Overwhelming symptoms  Requesting Provider: Shannen Pacheco MD   Primary Care Physician: Vidhi Navarro MD     SUMMARY:   Jerlene Goodpasture is a 80 y. o. with a past history of arthritis, colon cancer,dementia, depression, HTN, who was admitted on 5/30/2019 from [facility] with a diagnosis of afib with RVR. Patient presented to the ED with complaints of AMS associated with agitation and confusion. ED eval revealed EKG with RVR and CT scan of head, chest, abdomen with no acute findings. Patient was admitted and started on Cardizem drip. Neurology consulted for altered mental status and recommended EEG to rule out subclinical seizure, MRI Brain w/o contrast to eval for possible stroke, and review labs to look for metabolic abnormalities that can cause AMS. Cardiology consulted for afib with RVR. Recommended utilizing IV diltiazem for rate control until encephalopathy clears, then oral diltiazem or beta blocker. Would not anticoagulate due to high risk of bleeding. Current medical issues leading to Palliative Medicine involvement include: overwhelming symptoms. SH: , lives at [facility]. Has three children. Daughter, Mirna Benedict lives locally. Daughter Sharifa Jordan and son Nikki Barrientos both live out of state. Patient has been wheelchair-bound for over 2 years. PALLIATIVE DIAGNOSES:   1. Confusion and agitation  2. Physical debility  3. Goals of care  4. DNR  5. ACP  6. Afib with RVR  7. Hx of dementia       PLAN:   1. Met with patient, patient's daughter Terri Spurling and son-in-law with Julio Cesar Mace LCSW and introduced the role of Palliative Medicine. 2. Patient thrashing in bed, eyes closed attempting to pull soft mitts off with her teeth. Not following command or opening her eyes. [place] notes that patient does respond to seroquel and haldol. Continue haldol every four hours as needed for agitation, oral seroquel twice daily when safe/able to take p.o. (was able to take a dose last night in the ED but not appropriate for this morning's dose). No s/s of pain or shortness of breath. 3. Goals of care--> Discussed current hospitalization and prior level of functioning. [place] reports that up until a few days ago was wheelchair/bed bound at baseline, requiring total assist with ADLs, was incontinent of bowel/bladder, but was communicative and able to feed herself. Had been enrolled in hospice care for the last 2 month with [address] [place] [address]. In the last few days, patient has been sleeping more during the day, up more at night and confused. On the day of admission, was more agitated and could not recognize her daughter, prompting revoking of hospice services and presenting to ED for work-up. Cushing reports that Jannet Jung had given them 30 days to find a new facility for patient, as her needs were too much for her to remain in [facility]. Family was deciding whether to place patient in a facility in [place] or in [place] where her other daughter lives. Reviewed work-up planned for AMS to determine cause and whether it will be reversible, but also concerned that this could be progression of dementia and her new baseline level of functioning. Cushing asked about whether her mentation will improve to the point where she can safely eat and whether or not IV fluids could be continued in facility. I told her this would not be an option and only option would be feeding tube. Son-in-law interjected that there would be no point in prolonging her in the event that she could not eat or if her mental status did not improve, Cushing agreed.  Family wishes to wait to see what the results are from the work-up and we will regroup on Monday to discuss where to go from here based on results and patient's progress. Will likely go to SNF with support of hospice at discharge. Will continue to follow. 4. Code status--> DNR; completed DDNR on file. 5. ACP--> Pt has AMD on file which appoints children Edwardo Woody, Caitie Olmstead, and Verner Cristal as joint medical POAs, all of whom must agree on decisions. ACP updated to reflect this. 6. Initial consult note routed to primary continuity provider and/or primary health care team members  7. Communicated plan of care with: Palliative IDT, [place] [address] Team     GOALS OF CARE / TREATMENT PREFERENCES:     GOALS OF CARE:  Patient/Health Care Proxy Stated Goals: (Complete work-up for AMS)    TREATMENT PREFERENCES:   Code Status: DNR    Advance Care Planning:  [x] [facility] Interdisciplinary Team has updated the ACP Navigator with Health Care Decision Maker and Patient Capacity      Primary Decision Maker: Faith Moser - Daughter - [phone]    Primary Decision Maker: Pam Other - Daughter    Primary Decision Maker: Haleigh Briones Planning 5/31/2019   Patient's Healthcare Decision Maker is: Named in scanned ACP document   Confirm Advance Directive Yes, on file   Does the patient have other document types Do Not Resuscitate       Medical Interventions: Limited additional interventions     Other Instructions: Other:    As far as possible, the palliative care team has discussed with patient / health care proxy about goals of care / treatment preferences for patient. HISTORY:     History obtained from: chart, family    CHIEF COMPLAINT: AMS    HPI/SUBJECTIVE:    The patient is:   [] Verbal and participatory  [x] Non-participatory due to: mental status    Patient presented to the ED with complaints of AMS associated with agitation and confusion. ED eval revealed EKG with RVR and CT scan of head, chest, abdomen with no acute findings. Patient was admitted and started on cardizem drip.     Neurology consulted for altered mental status and recommended EEG to rule out subclinical seizure, MRI Brain w/o contrast to eval for possible stroke, and review labs to look for metabolic abnormalities that can cause AMS. Cardiology consulted for afib with RVR. Recommended utilizing IV diltiazem for rate control until encephalopathy clears, then oral diltiazem or beta blocker. Would not anticoagulate due to high risk of bleeding. Patient thrashing in bed, eyes closed attempting to pull soft mitts off with her teeth. Not following command or opening her eyes. Clinical Pain Assessment (nonverbal scale for severity on nonverbal patients):   Clinical Pain Assessment  Severity: 0     Activity (Movement): Lying quietly, normal position    Duration: for how long has pt been experiencing pain (e.g., 2 days, 1 month, years)  Frequency: how often pain is an issue (e.g., several times per day, once every few days, constant)     FUNCTIONAL ASSESSMENT:     Palliative Performance Scale (PPS):  PPS: 30       PSYCHOSOCIAL/SPIRITUAL SCREENING:     Palliative IDT has assessed this patient for cultural preferences / practices and a referral made as appropriate to needs (Cultural Services, Patient Advocacy, Ethics, etc.)    Any spiritual / Mu-ism concerns:  [] Yes /  [x] No    Caregiver Burnout:  [] Yes /  [x] No /  [] No Caregiver Present      Anticipatory grief assessment:   [x] Normal  / [] Maladaptive       ESAS Anxiety: Anxiety: 5    ESAS Depression:          REVIEW OF SYSTEMS:     Positive and pertinent negative findings in ROS are noted above in HPI. The following systems were [x] reviewed / [] unable to be reviewed as noted in HPI  Other findings are noted below. Systems: constitutional, ears/nose/mouth/throat, respiratory, gastrointestinal, genitourinary, musculoskeletal, integumentary, neurologic, psychiatric, endocrine. Positive findings noted below.   Modified ESAS Completed by: provider     Drowsiness: 4     Pain: 0 Anxiety: 5       Dyspnea: 0                    PHYSICAL EXAM:     From RN flowsheet:  Wt Readings from Last 3 Encounters:   05/31/19 165 lb (74.8 kg)   09/13/18 165 lb (74.8 kg)   09/07/18 165 lb (74.8 kg)     Blood pressure 108/55, pulse 87, temperature 98.2 °F (36.8 °C), resp. rate 16, height 5' 4\" (1.626 m), weight 165 lb (74.8 kg), SpO2 92 %. Pain Scale 1: Visual  Pain Intensity 1: 4                 Last bowel movement, if known:     Constitutional: Agitated  Eyes: eyes closed  ENMT: no nasal discharge, moist mucous membranes  Cardiovascular: irregular rhythm, distal pulses intact  Respiratory: breathing not labored, symmetric  Gastrointestinal: soft non-tender, +bowel sounds  Musculoskeletal: no deformity, no tenderness to palpation  Skin: warm, dry  Neurologic: not following commands, moving all extremities  Psychiatric: not able to assess  Other:       HISTORY:     Principal Problem:    Atrial fibrillation with RVR ([facility] (5/30/2019)    Active Problems:    High cholesterol ()      Hypertension ()      Depression ()      Advanced care planning/counseling discussion (7/14/2016)      Overview: A copy of patient's AMD is on file. NN reviewed document with patient &       patient denies changes to the document.                    Colon cancer ([facility] (5/30/2019)      Dementia (5/30/2019)      Dehydration (5/30/2019)      Acute metabolic encephalopathy (7/00/2188)      Past Medical History:   Diagnosis Date    Arthritis     Arthritis     Dr. Vincent Hayden [facility])    Federico Matamoros [facility])     colon CA in situ    Colon cancer ([facility]     Dementia     Depression     Hematoma (nontraumatic) of breast     Dr. Cheyenne freeman breast surgeon    High cholesterol     Hypertension     Osteoporosis     Osteoporosis     Dr. 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   Social History     Tobacco Use    Smoking status: Former Smoker     Packs/day: 0.50     Last attempt to quit: 1970     Years since quittin.4    Smokeless tobacco: Never Used   Substance Use Topics    Alcohol use: No     Frequency: Never     Allergies   Allergen Reactions    Latex Unknown (comments)     Pt unable to report      Codeine Unknown (comments)    Neosporin [Benzalkonium Chloride] Rash    Sulfa (Sulfonamide Antibiotics) Unknown (comments)    Sulfa Dyne Other (comments)     \"Crying jag\"      Current Facility-Administered Medications   Medication Dose Route Frequency    cephALEXin (KEFLEX) capsule 500 mg  500 mg Oral Q8H    enoxaparin (LOVENOX) injection 40 mg  40 mg SubCUTAneous 7am    morphine injection 2 mg  2 mg IntraVENous Q4H PRN    sodium chloride (NS) flush 5-10 mL  5-10 mL IntraVENous PRN    dilTIAZem (CARDIZEM) 125 mg in dextrose 5% 125 mL infusion  0-15 mg/hr IntraVENous TITRATE    escitalopram oxalate (LEXAPRO) tablet 10 mg  10 mg Oral DAILY    fluticasone propionate (FLONASE) 50 mcg/actuation nasal spray 2 Spray  2 Spray Both Nostrils DAILY    dextrose 5% - 0.45% NaCl with KCl 20 mEq/L infusion  75 mL/hr IntraVENous CONTINUOUS    haloperidol lactate (HALDOL) injection 5 mg  5 mg IntraMUSCular Q4H PRN    acetaminophen (TYLENOL) tablet 650 mg  650 mg Oral Q4H PRN    diphenhydrAMINE (BENADRYL) capsule 25 mg  25 mg Oral Q4H PRN    ondansetron (ZOFRAN) injection 4 mg  4 mg IntraVENous Q4H PRN    budesonide (PULMICORT) 500 mcg/2 ml nebulizer suspension  500 mcg Nebulization Q6HWA RT    memantine (NAMENDA) tablet 10 mg  10 mg Oral DAILY    mirabegron ER (MYRBETRIQ) tablet 25 mg  25 mg Oral DAILY    montelukast (SINGULAIR) tablet 10 mg  10 mg Oral DAILY    morphine (ROXANOL) 100 mg/5 mL (20 mg/mL) concentrated solution 10 mg  10 mg Oral Q2H PRN    predniSONE (DELTASONE) tablet 20 mg  20 mg Oral DAILY WITH BREAKFAST    QUEtiapine (SEROquel) tablet 25 mg  25 mg Oral BID    famotidine (PEPCID) tablet 20 mg  20 mg Oral DAILY          LAB AND IMAGING FINDINGS:     Lab Results   Component Value Date/Time    WBC 9.9 05/30/2019 03:24 PM    HGB 13.5 05/30/2019 03:24 PM    PLATELET 754 25/59/0830 03:24 PM     Lab Results   Component Value Date/Time    Sodium 143 05/30/2019 03:24 PM    Potassium 3.8 05/30/2019 03:24 PM    Chloride 110 (H) 05/30/2019 03:24 PM    CO2 26 05/30/2019 03:24 PM    BUN 30 (H) 05/30/2019 03:24 PM    Creatinine 1.01 05/30/2019 03:24 PM    Calcium 9.1 05/30/2019 03:24 PM    Magnesium 2.1 02/21/2017 05:29 AM    Phosphorus 3.8 02/21/2017 05:29 AM      Lab Results   Component Value Date/Time    AST (SGOT) 17 05/30/2019 03:24 PM    Alk. phosphatase 66 05/30/2019 03:24 PM    Protein, total 6.8 05/30/2019 03:24 PM    Albumin 3.2 (L) 05/30/2019 03:24 PM    Globulin 3.6 05/30/2019 03:24 PM     Lab Results   Component Value Date/Time    INR 1.0 11/18/2015 09:59 AM    Prothrombin time 9.7 11/18/2015 09:59 AM    aPTT 24.7 11/18/2015 09:59 AM      Lab Results   Component Value Date/Time    Iron 36 03/28/2017 02:39 PM    TIBC 279 03/28/2017 02:39 PM    Iron % saturation 13 (L) 03/28/2017 02:39 PM    Ferritin 155 (H) 03/28/2017 02:39 PM      No results found for: PH, PCO2, PO2  No components found for: GLPOC   No results found for: CPK, CKMB             Total time: 70 min  Counseling / coordination time, spent as noted above: 50 min  > 50% counseling / coordination?: yes    Prolonged service was provided for  []30 min   []75 min in face to face time in the presence of the patient, spent as noted above.   Time Start:   Time End:   Note: this can only be billed with 86720 (initial) or 53291 (follow up). If multiple start / stop times, list each separately.

## 2019-05-31 NOTE — PROGRESS NOTES
Sherif Martel [facility]  [address] [facility], [place], [id number] [place] [address]  [phone]      Medical Progress Note      NAME: Hao Ramirez   :  1924  MRM:  [id number]    Date/Time: 2019  10:52 AM       Assessment and Plan:   1. Atrial fibrillation with RVR / Hypertension - POA, likely prior hx of paroxysmal afib. TSH is normal. Check ECHO. Consult cardiology. Rate better controlled on diltiazem drip. Dementia is too great to consider anticoagulation     2. Acute metabolic encephalopathy / Dementia / Depression - Worsening for the last few months. doubt infection. Severe agitation and violence at worst. appreciated neurology and palliative care consult. Possible advanced dementia. Continue lexatpro, memantine, seroquel, trazodone and added haldol. Sitter if needed. Stop ativan. MRI of the brain was ordered by neurology.      3. Renal insufficiency/ Dehydration - POA due to poor intake. Hydrate.      4. COPD - continue Singulair, Flonase, Pulmicort and albuterol       5. High cholesterol - stop all testing or treatment. 6.  Wound on both legs(POA). Wound care specialist evaluation       has extensive discussion with pt's daughter and her . Code status: DNR           Subjective:     Chief Complaint:  Follow up of pt who was admitted with afib with RVR. Objective:     Last 24hrs VS reviewed since prior progress note.  Most recent are:    Visit Vitals  BP 91/72   Pulse 92   Temp 98.5 °F (36.9 °C)   Resp 16   Ht 5' 4\" (1.626 m)   Wt 74.8 kg (165 lb)   SpO2 95%   BMI 28.32 kg/m²     SpO2 Readings from Last 6 Encounters:   19 95%   19 93%   18 96%   18 98%   18 97%   18 97%    O2 Flow Rate (L/min): 2 l/min       Intake/Output Summary (Last 24 hours) at 2019 1052  Last data filed at 2019 1942  Gross per 24 hour   Intake 1000 ml   Output    Net 1000 ml        Physical Exam:    Gen:  Well-developed, well-nourished, in no acute distress  HEENT:  Pink conjunctivae, PERRL, hearing intact to voice, moist mucous membranes  Neck:  Supple, without masses, thyroid non-tender  Resp:  No accessory muscle use, clear breath sounds without wheezes rales or rhonchi  Card:  No murmurs, normal S1, S2 without thrills, bruits or peripheral edema  Abd:  Soft, non-tender, non-distended, normoactive bowel sounds are present, no palpable organomegaly and no detectable hernias  Lymph:  No cervical or inguinal adenopathy  Musc:  No cyanosis or clubbing  Skin:  No rashes or ulcers, skin turgor is good  Neuro:  Unable to assess as pt unable to follow commands    Psych:  Pt disoriented.   __________________________________________________________________  Medications Reviewed: (see below)  Medications:     Current Facility-Administered Medications   Medication Dose Route Frequency    cephALEXin (KEFLEX) capsule 500 mg  500 mg Oral Q8H    sodium chloride (NS) flush 5-10 mL  5-10 mL IntraVENous PRN    dilTIAZem (CARDIZEM) 125 mg in dextrose 5% 125 mL infusion  0-15 mg/hr IntraVENous TITRATE    escitalopram oxalate (LEXAPRO) tablet 10 mg  10 mg Oral DAILY    fluticasone propionate (FLONASE) 50 mcg/actuation nasal spray 2 Spray  2 Spray Both Nostrils DAILY    dextrose 5% - 0.45% NaCl with KCl 20 mEq/L infusion  100 mL/hr IntraVENous CONTINUOUS    haloperidol lactate (HALDOL) injection 5 mg  5 mg IntraMUSCular Q4H PRN    acetaminophen (TYLENOL) tablet 650 mg  650 mg Oral Q4H PRN    diphenhydrAMINE (BENADRYL) capsule 25 mg  25 mg Oral Q4H PRN    ondansetron (ZOFRAN) injection 4 mg  4 mg IntraVENous Q4H PRN    budesonide (PULMICORT) 500 mcg/2 ml nebulizer suspension  500 mcg Nebulization Q6HWA RT    memantine (NAMENDA) tablet 10 mg  10 mg Oral DAILY    mirabegron ER (MYRBETRIQ) tablet 25 mg  25 mg Oral DAILY    montelukast (SINGULAIR) tablet 10 mg  10 mg Oral DAILY    morphine (ROXANOL) 100 mg/5 mL (20 mg/mL) concentrated solution 10 mg  10 mg Oral Q2H PRN    predniSONE (DELTASONE) tablet 20 mg  20 mg Oral DAILY WITH BREAKFAST    QUEtiapine (SEROquel) tablet 25 mg  25 mg Oral BID    famotidine (PEPCID) tablet 20 mg  20 mg Oral DAILY        Lab Data Reviewed: (see below)  Lab Review:     Recent Labs     05/30/19  1524   WBC 9.9   HGB 13.5   HCT 42.9        Recent Labs     05/30/19  1524      K 3.8   *   CO2 26   *   BUN 30*   CREA 1.01   CA 9.1   ALB 3.2*   TBILI 0.4   SGOT 17   ALT 22     Lab Results   Component Value Date/Time    Glucose (POC) 119 (H) 05/30/2019 02:56 PM     No results for input(s): PH, PCO2, PO2, HCO3, FIO2 in the last 72 hours. No results for input(s): INR in the last 72 hours. No lab exists for component: INREXT  All Micro Results     Procedure Component Value Units Date/Time    CULTURE, BLOOD [[id number]] Collected:  05/30/19 1524    Order Status:  Completed Specimen:  Blood Updated:  05/31/19 0728     Special Requests: NO SPECIAL REQUESTS        Culture result: NO GROWTH AFTER 15 HOURS       CULTURE, BLOOD [[id number]] Collected:  05/30/19 1537    Order Status:  Completed Specimen:  Blood Updated:  05/31/19 0728     Special Requests: NO SPECIAL REQUESTS        Culture result: NO GROWTH AFTER 15 HOURS       URINE CULTURE HOLD SAMPLE [[id number]] Collected:  05/30/19 1638    Order Status:  Canceled Specimen:  Urine from Serum Updated:  05/30/19 1644          I have reviewed notes of prior 24hr. Other pertinent lab:       Total time spent with patient: [place] discussed with: Family, Nursing Staff, Consultant/Specialist and >50% of time spent in counseling and coordination of care    Discussed:  Care Plan    Prophylaxis:  Lovenox    Disposition:  SNF/LTC           ___________________________________________________    Attending Physician: Virginia Yuan MD

## 2019-05-31 NOTE — CONSULTS
[facility]: [address] Neurology  Mar[address]  [phone]        Name:   Lydia Noland record #: [id number]  Admission Date: 5/30/2019     Who Consulted: Dr. Sapphire Paul    Reason for Consult:  Rapidly progressing dementia    HISTORY OF PRESENT ILLNESS:     This is a 80 y.o. female who is admitted for persisting AMS with confusion and agitation. The Neurology Service is asked to evaluate for dementia. Ms. Candi Rosas presented to the ED with intermittent AMS for a few weeks predominantly at night; however, over the past two days the patient has had AMS during the day with associated agitation and confusion. Upon exam she was found to have be in afib with a bp of 163/86. She has not been seen by [facility] neurology. Her daughter reports that she was originally diagnosed with dementia by PCP 3-4 years ago (mild memory loss at the time) but was functional, interactive, conversant for the most part until around Fall (Oct-Nov 2018) when family started to notice slight increase in cognitive difficulty. Daughter notes that patient attended [facility] and [place] with family and seemed to be doing well, talking with all the family/ grand kids, making sense, no agitation issues. Pt started to have behavioral changes around 2 months ago, predominantly at night. Trazodone wasn't keeping her asleep and then she was put on Lorazepam QHS which kept her sleep. Over past 4-5 days patient has been very agitated, hallucinating. Neuro-imaging:     CT Head: No acute process      EKG: Atrial Fib    Care Plan discussed with:  Patient x   Family    [address] [place] [address]    Consultant/Specialist:         Thank you for allowing the Neurology Service the pleasure of participating in the care of your patient.   This patient will be discussed with my collaborating care team physician Dr. Cesar Kelley and he may have further recommendations regarding this patient's care      Tiana STEELE Lara marcial, ACNP-BC  ====================    Attending Attestation:       Patient seen, examined with NP Lara ceja. Discussed with Daughter at bedside. Agree with Hx as documented above by NP Lara ceja. No additions to that (I modified her hx). New-onset A Fib. CT head shows severe generalized atrophy with severe white matter changes. No evidence of acute stroke on CT. On exam, very agitated, wearing hand mittens, thrashing around (5/5 in all exts), doesn't follow any commands although does briefly open eyes at one point. Cannot test majority of exam (sensory, cerebellar, gait, romberg). DTRs are difficult to assess due to agitation. Impression/ Plan    Hx of Dementia (3-4 yrs), mild at the time  Some increased symptoms around [facility] 2018  Sundowning behaviors x 2 months  Drastic agitation/ behavioral chagnes over past few days  New dx A Fib    D/w daughter: check EEG to rule out subclinical seizure, check MRI Brain w/o contrast to eval for possible stroke, review labs to look for metabolic abnormalities that can cause AMS. Signed By: Fuad Escalona MD     May 31, 2019             Impression/ Plan:      1. Altered Mental Status:    · Neurochecks:  Every 4 hours  · CBC, CMP, TSH unremarkbable  · Urine + for UTI  · Check Folate, B12, TSH, Ammonia  · EEG to rule out subclinical seizures  · MRI brain  · Stop sedating medications         Review of Systems: 10 point ROS was performed. Pertinent positives listed in HPI. Negative ROS is as follows. Pt denies: angina, palpitations, paresthesias, weakness, vision loss, slurred speech, aphasia, confusion, fever, chills, falls, headache, diplopia, back pain, neck pain, prior episodes of vertigo, hallucinations, new medications or dosage changes. Physical Exam    General:   Alert, cooperative, no acute distress. Lungs:   Clear to auscultation bilaterally. Bilateral crackles/wheezes.    Heart:  Abdominal:  Normal rhythm, no carotid bruits, no peripheral edema  Soft and nondistended   NEUROLOGICAL EXAM:     Mental Status:  Non verbal, does not follow commands, agitated      Cranial Nerves:   EOM: no nystagmus. Facial movements:  symmetric, no ptosis Facial sensation:  intact to LT on both sides. Language:  no dysarthria, no aphasia, normal fluency, normal repetition. Tongue: midline. Soft palate: not examined  SCMs: normal, symmetric. Reflexes:   LUExt: 2+/ 4                 RUExt: 2+/ 4  LLExt: 2+/4                  RLExt: 2+/ 4          Sensory:   LT and Temp intact in all extremities           Cerebellar:  No resting, no postural tremors, normal finger nose finger. No pronator drift                            Motor:       Spontaneously moves all ext, withdrawls to pain        Gait:   Not tested              Allergies: Allergies   Allergen Reactions    Latex Unknown (comments)     Pt unable to report      Codeine Unknown (comments)    Neosporin [Benzalkonium Chloride] Rash    Sulfa (Sulfonamide Antibiotics) Unknown (comments)    Sulfa Dyne Other (comments)     \"Crying jag\"       Outpatient Meds  No current facility-administered medications on file prior to encounter. Current Outpatient Medications on File Prior to Encounter   Medication Sig Dispense Refill    albuterol (PROVENTIL VENTOLIN) 2.5 mg /3 mL (0.083 %) nebulizer solution 2.5 mg by Nebulization route every four (4) hours as needed for Wheezing.  cetirizine (ZYRTEC) 10 mg tablet Take 10 mg by mouth daily.  cephALEXin (KEFLEX) 500 mg capsule Take 500 mg by mouth four (4) times daily.  budesonide (PULMICORT) 0.5 mg/2 mL nbsp 500 mcg by Nebulization route every six (6) hours.  Flaxseed Oil oil Take 1,400 mg by mouth daily. Indications: Eczema      lanolin alcohol-mineral oil-petrolatum (EUCERIN) topical cream Apply  to affected area three (3) times daily.  Apply all over body specifically arms, legs, back, and chest for eczema      LORazepam (ATIVAN) 0.5 mg tablet Take 0.25 mg by mouth nightly.  memantine (NAMENDA) 10 mg tablet Take 10 mg by mouth daily.  mirabegron ER (MYRBETRIQ) 25 mg ER tablet Take 25 mg by mouth daily.  predniSONE (DELTASONE) 20 mg tablet Take 20 mg by mouth daily (with breakfast).  QUEtiapine (SEROQUEL) 25 mg tablet Take 25 mg by mouth two (2) times a day.  raNITIdine (ZANTAC) 150 mg tablet Take 150 mg by mouth two (2) times a day.  traZODone (DESYREL) 150 mg tablet Take 150 mg by mouth nightly.  albuterol (PROVENTIL VENTOLIN) 2.5 mg /3 mL (0.083 %) nebulizer solution 2.5 mg by Nebulization route three (3) times daily.  morphine (ROXANOL) 100 mg/5 mL (20 mg/mL) concentrated solution Take 10 mg by mouth every two (2) hours as needed for Pain (SOB or pain).  LORazepam (INTENSOL) 2 mg/mL concentrated solution Take 0.5 mg by mouth every four (4) hours as needed for Anxiety (anxiety, agitation, body movements).  montelukast (SINGULAIR) 10 mg tablet Take 1 Tab by mouth daily. 30 Tab 1    acetaminophen (TYLENOL EXTRA STRENGTH) 500 mg tablet Take 2 Tabs by mouth every six (6) hours as needed for Pain. 30 Tab 0    guaiFENesin ER (MUCINEX) 600 mg ER tablet Take 1 Tab by mouth two (2) times a day. Follow with 6 oz of water 60 Tab 1    diphenhydrAMINE (BENADRYL ALLERGY) 12.5 mg/5 mL syrup Take 10 mL by mouth nightly as needed. Indications: Urticaria (Patient taking differently: Take 25 mg by mouth nightly as needed for Other (dermatitis). Indications: Hives) 236 mL 0    fluticasone (FLONASE) 50 mcg/actuation nasal spray 2 Sprays by Both Nostrils route daily. 1 Bottle 3    traMADol (ULTRAM) 50 mg tablet Take 1 Tab by mouth every six (6) hours as needed for Pain. Max Daily Amount: 200 mg. 30 Tab 0    therapeutic multivitamin (THERAGRAN) tablet Take 1 Tab by mouth two (2) times a day.  escitalopram oxalate (LEXAPRO) 10 mg tablet Take 1 Tab by mouth daily.  90 Tab 1    Cholecalciferol, Vitamin D3, 1,000 unit cap Take 1 capsule by mouth daily 90 Cap 1       Inpatient Meds    Current Facility-Administered Medications   Medication Dose Route Frequency Provider Last Rate Last Dose    cephALEXin (KEFLEX) capsule 500 mg  500 mg Oral Q8H Ángela Kelly MD        enoxaparin (LOVENOX) injection 40 mg  40 mg SubCUTAneous 7am Rosetta Schneider MD        sodium chloride (NS) flush 5-10 mL  5-10 mL IntraVENous PRN Ángela Kelly MD        dilTIAZem (CARDIZEM) 125 mg in dextrose 5% 125 mL infusion  0-15 mg/hr IntraVENous TITRATE Ángela Kelly MD 2.5 mL/hr at 05/31/19 0950 2.5 mg/hr at 05/31/19 0950    escitalopram oxalate (LEXAPRO) tablet 10 mg  10 mg Oral DAILY Ángela Kelly MD        fluticasone propionate (FLONASE) 50 mcg/actuation nasal spray 2 Spray  2 Spray Both Nostrils DAILY Ángela Kelly MD        dextrose 5% - 0.45% NaCl with KCl 20 mEq/L infusion  100 mL/hr IntraVENous CONTINUOUS Ángela Kelly  mL/hr at 05/31/19 0840 100 mL/hr at 05/31/19 0840    haloperidol lactate (HALDOL) injection 5 mg  5 mg IntraMUSCular Q4H PRN Ángela Kelly MD   5 mg at 05/31/19 0423    acetaminophen (TYLENOL) tablet 650 mg  650 mg Oral Q4H PRN Ángela Kelly MD        diphenhydrAMINE (BENADRYL) capsule 25 mg  25 mg Oral Q4H PRN Ángela Kelly MD        ondansetron [facility]) injection 4 mg  4 mg IntraVENous Q4H PRN Ángela Kelly MD        budesonide (PULMICORT) 500 mcg/2 ml nebulizer suspension  500 mcg Nebulization Q6HWA RT Ángela Kelly MD   Stopped at 05/31/19 0800    memantine (NAMENDA) tablet 10 mg  10 mg Oral DAILY Ángela Kelly MD        mirabegron ER [facility]) tablet 25 mg  25 mg Oral DAILY Ángela Kelly MD        montelukast (SINGULAIR) tablet 10 mg  10 mg Oral DAILY Ángela Kelly MD        morphine (ROXANOL) 100 mg/5 mL (20 mg/mL) concentrated solution 10 mg  10 mg Oral Q2H PRN Ángela Kelly MD        predniSONE (DELTASONE) tablet 20 mg  20 mg Oral DAILY WITH BREAKFAST Stella Mccain MD        QUEtiapine (SEROquel) tablet 25 mg  25 mg Oral BID Stella Mccain MD   25 mg at 05/30/19 2306    famotidine (PEPCID) tablet 20 mg  20 mg Oral DAILY Stella Mccain MD               Past Medical History:   Diagnosis Date   Bib Kat     Dr. Shon Fernandez [facility])    Emeli Silva [facility])     colon CA in situ    Colon cancer ([facility] [address]     Dementia     Depression     Hematoma (nontraumatic) of breast     Dr. Jennifer freeman breast surgeon    High cholesterol     Hypertension     Osteoporosis     Osteoporosis     Dr. 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 reports that she quit smoking about 49 years ago. She smoked 0.50 packs per day. She has never used smokeless tobacco. She reports that she does not drink alcohol or use drugs.                Lab Results (last 24 hrs)  Recent Results (from the past 24 hour(s))   GLUCOSE, POC    Collection Time: 05/30/19  2:56 PM   Result Value Ref Range    Glucose (POC) 119 (H) 65 - 100 mg/dL    Performed by [facility])    POC LACTIC ACID    Collection Time: 05/30/19  3:11 PM   Result Value Ref Range    Lactic Acid (POC) 1.84 0.40 - 2.00 mmol/L   CULTURE, BLOOD    Collection Time: 05/30/19  3:24 PM   Result Value Ref Range    Special Requests: NO SPECIAL REQUESTS      Culture result: NO GROWTH AFTER 15 HOURS     METABOLIC PANEL, COMPREHENSIVE    Collection Time: 05/30/19  3:24 PM   Result Value Ref Range    Sodium 143 136 - 145 mmol/L    Potassium 3.8 3.5 - 5.1 mmol/L    Chloride 110 (H) 97 - 108 mmol/L    CO2 26 21 - 32 mmol/L    Anion gap 7 5 - 15 mmol/L    Glucose 133 (H) 65 - 100 mg/dL    BUN 30 (H) 6 - 20 MG/DL    Creatinine 1.01 0.55 - 1.02 MG/DL    BUN/Creatinine ratio 30 (H) 12 - 20      GFR est AA >60 >60 ml/min/1.73m2    GFR est non-AA 51 (L) >60 ml/min/1.73m2    Calcium 9.1 8.5 - 10.1 MG/DL    Bilirubin, total 0.4 0.2 - 1.0 MG/DL    ALT (SGPT) 22 12 - 78 U/L    AST (SGOT) 17 15 - 37 U/L    Alk. phosphatase 66 45 - 117 U/L    Protein, total 6.8 6.4 - 8.2 g/dL    Albumin 3.2 (L) 3.5 - 5.0 g/dL    Globulin 3.6 2.0 - 4.0 g/dL    A-G Ratio 0.9 (L) 1.1 - 2.2     CBC WITH AUTOMATED DIFF    Collection Time: 05/30/19  3:24 PM   Result Value Ref Range    WBC 9.9 3.6 - 11.0 K/uL    RBC 4.51 3.80 - 5.20 M/uL    HGB 13.5 11.5 - 16.0 g/dL    HCT 42.9 35.0 - 47.0 %    MCV 95.1 80.0 - 99.0 FL    MCH 29.9 26.0 - 34.0 PG    MCHC 31.5 30.0 - 36.5 g/dL    RDW 13.7 11.5 - 14.5 %    PLATELET 967 258 - 453 K/uL    MPV 10.3 8.9 - 12.9 FL    NRBC 0.0 0  WBC    ABSOLUTE NRBC 0.00 0.00 - 0.01 K/uL    NEUTROPHILS 68 32 - 75 %    LYMPHOCYTES 12 12 - 49 %    MONOCYTES 13 5 - 13 %    EOSINOPHILS 5 0 - 7 %    BASOPHILS 1 0 - 1 %    IMMATURE GRANULOCYTES 1 (H) 0.0 - 0.5 %    ABS. NEUTROPHILS 6.8 1.8 - 8.0 K/UL    ABS. LYMPHOCYTES 1.2 0.8 - 3.5 K/UL    ABS. MONOCYTES 1.3 (H) 0.0 - 1.0 K/UL    ABS. EOSINOPHILS 0.5 (H) 0.0 - 0.4 K/UL    ABS. BASOPHILS 0.1 0.0 - 0.1 K/UL    ABS. IMM. GRANS. 0.1 (H) 0.00 - 0.04 K/UL    DF AUTOMATED     SAMPLES BEING HELD    Collection Time: 05/30/19  3:24 PM   Result Value Ref Range    SAMPLES BEING HELD RED,BLUE     COMMENT        Add-on orders for these samples will be processed based on acceptable specimen integrity and analyte stability, which may vary by analyte.    POC TROPONIN-I    Collection Time: 05/30/19  3:24 PM   Result Value Ref Range    Troponin-I (POC) <0.04 0.00 - 0.08 ng/mL   TROPONIN I    Collection Time: 05/30/19  3:24 PM   Result Value Ref Range    Troponin-I, Qt. <0.05 <0.05 ng/mL   TSH 3RD GENERATION    Collection Time: 05/30/19  3:24 PM   Result Value Ref Range    TSH 1.76 0.36 - 3.74 uIU/mL   VITAMIN B12    Collection Time: 05/30/19  3:24 PM   Result Value Ref Range    Vitamin B12 1,621 (H) 193 - 986 pg/mL   CULTURE, BLOOD    Collection Time: 05/30/19  3:37 PM   Result Value Ref Range    Special Requests: NO SPECIAL REQUESTS      Culture result: NO GROWTH AFTER 15 HOURS     URINALYSIS W/ REFLEX CULTURE    Collection Time: 05/30/19  4:38 PM   Result Value Ref Range    Color DARK YELLOW      Appearance TURBID (A) CLEAR      Specific gravity 1.028 1.003 - 1.030      pH (UA) 5.5 5.0 - 8.0      Protein 30 (A) NEG mg/dL    Glucose NEGATIVE  NEG mg/dL    Ketone 15 (A) NEG mg/dL    Blood NEGATIVE  NEG      Urobilinogen 1.0 0.2 - 1.0 EU/dL    Nitrites NEGATIVE  NEG      Leukocyte Esterase NEGATIVE  NEG      WBC 0-4 0 - 4 /hpf    RBC 0-5 0 - 5 /hpf    Epithelial cells MODERATE (A) FEW /lpf    Bacteria NEGATIVE  NEG /hpf    UA:UC IF INDICATED CULTURE NOT INDICATED BY UA RESULT CNI      Mucus 3+ (A) NEG /lpf    Amorphous Crystals 1+ (A) NEG   BILIRUBIN, CONFIRM    Collection Time: 05/30/19  4:38 PM   Result Value Ref Range    Bilirubin UA, confirm NEGATIVE  NEG

## 2019-05-31 NOTE — PROGRESS NOTES
Admission Medication Reconciliation:    Comments/Recommendations:  -Medication history obtained in the emergency department (room 11)  -Med rec completed using MAR from [facility]    Medications added: Albuterol scheduled neb, budesonide scheduled neb, cephalexin starting 5/27 for 10 days, Zyrtec, Myrbetriq, chronic prednisone for COPD, scheduled Seroquel, ranitidine, trazodone, flaxseed oil, Eucerin cream, scheduled and PRN lorazepam, memantine, PRN morphine for SOB/pain  Medications removed: Bismuth, Prolia, Zofran, triamcinolone cream, Lidex cream  Medications changed: Albuterol PRN neb frequency    Information obtained from: [facility] from [place]    Significant PMH/Disease States:   Past Medical History:   Diagnosis Date    Arthritis     Arthritis     Dr. Ethel López [facility])     Cancer [facility])     colon CA in situ    Colon cancer ([facility] [address]     Dementia     Depression     Hematoma (nontraumatic) of breast     Dr. Kae freeman breast surgeon    High cholesterol     Hypertension     Osteoporosis     Osteoporosis     Dr. 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 crying at night and during the day,       Allergies:  Latex; Codeine; Neosporin [benzalkonium chloride]; Sulfa (sulfonamide antibiotics); and Sulfa dyne    Prior to Admission Medications:   Prior to Admission Medications   Prescriptions Last Dose Informant Patient Reported? Taking? Cholecalciferol, Vitamin D3, 1,000 unit cap  Transfer Papers No Yes   Sig: Take 1 capsule by mouth daily   Flaxseed Oil oil   Yes Yes   Sig: Take 1,400 mg by mouth daily. Indications: Eczema   LORazepam (ATIVAN) 0.5 mg tablet   Yes Yes   Sig: Take 0.25 mg by mouth nightly.    LORazepam (INTENSOL) 2 mg/mL concentrated solution   Yes Yes   Sig: Take 0.5 mg by mouth every four (4) hours as needed for Anxiety (anxiety, agitation, body movements). QUEtiapine (SEROQUEL) 25 mg tablet   Yes Yes   Sig: Take 25 mg by mouth two (2) times a day. acetaminophen (TYLENOL EXTRA STRENGTH) 500 mg tablet   No Yes   Sig: Take 2 Tabs by mouth every six (6) hours as needed for Pain. albuterol (PROVENTIL VENTOLIN) 2.5 mg /3 mL (0.083 %) nebulizer solution   Yes Yes   Si.5 mg by Nebulization route every four (4) hours as needed for Wheezing. albuterol (PROVENTIL VENTOLIN) 2.5 mg /3 mL (0.083 %) nebulizer solution   Yes Yes   Si.5 mg by Nebulization route three (3) times daily. budesonide (PULMICORT) 0.5 mg/2 mL nbsp   Yes Yes   Si mcg by Nebulization route every six (6) hours. cephALEXin (KEFLEX) 500 mg capsule   Yes Yes   Sig: Take 500 mg by mouth four (4) times daily. cetirizine (ZYRTEC) 10 mg tablet   Yes Yes   Sig: Take 10 mg by mouth daily. diphenhydrAMINE (BENADRYL ALLERGY) 12.5 mg/5 mL syrup   No Yes   Sig: Take 10 mL by mouth nightly as needed. Indications: Urticaria   Patient taking differently: Take 25 mg by mouth nightly as needed for Other (dermatitis). Indications: Hives   escitalopram oxalate (LEXAPRO) 10 mg tablet  Transfer Papers No Yes   Sig: Take 1 Tab by mouth daily. fluticasone (FLONASE) 50 mcg/actuation nasal spray   No Yes   Si Sprays by Both Nostrils route daily. guaiFENesin ER (MUCINEX) 600 mg ER tablet   No Yes   Sig: Take 1 Tab by mouth two (2) times a day. Follow with 6 oz of water   lanolin alcohol-mineral oil-petrolatum (EUCERIN) topical cream   Yes Yes   Sig: Apply  to affected area three (3) times daily. Apply all over body specifically arms, legs, back, and chest for eczema   memantine (NAMENDA) 10 mg tablet   Yes Yes   Sig: Take 10 mg by mouth daily. mirabegron ER (MYRBETRIQ) 25 mg ER tablet   Yes Yes   Sig: Take 25 mg by mouth daily.    montelukast (SINGULAIR) 10 mg tablet   No Yes   Sig: Take 1 Tab by mouth daily. morphine (ROXANOL) 100 mg/5 mL (20 mg/mL) concentrated solution   Yes Yes   Sig: Take 10 mg by mouth every two (2) hours as needed for Pain (SOB or pain). predniSONE (DELTASONE) 20 mg tablet   Yes Yes   Sig: Take 20 mg by mouth daily (with breakfast). raNITIdine (ZANTAC) 150 mg tablet   Yes Yes   Sig: Take 150 mg by mouth two (2) times a day. therapeutic multivitamin [facility]) tablet  Transfer Papers Yes Yes   Sig: Take 1 Tab by mouth two (2) times a day. traMADol (ULTRAM) 50 mg tablet   No Yes   Sig: Take 1 Tab by mouth every six (6) hours as needed for Pain. Max Daily Amount: 200 mg.   traZODone (DESYREL) 150 mg tablet   Yes Yes   Sig: Take 150 mg by mouth nightly.       Facility-Administered Medications: None     Thank you,  Marc Ulrich, PHARMD

## 2019-05-31 NOTE — PROGRESS NOTES
Palliative Medicine      Code Status: DNR    Advance Care Planning:  Advance Care Planning 2019   Patient's Healthcare Decision Maker is: Named in scanned ACP document   Confirm Advance Directive Yes, on file   Does the patient have other document types Do Not Resuscitate       Patient / Family Encounter Documentation    Participants (names): Pt, dtr Kelechi Card, son-in-law Delores Jordan, Palliative Medicine (NP Rafiq Ng)    Narrative:  Met with dtr and ALCIRA at bedside. Pt was restless/agitated in bed, moaning through much of visit, trying to remove mittens with her mouth. Family reports pt resided at at [facility] until moving to [address] [facility] [address] 4 yrs ago.  is , pt has 2 dtrs and 1 son, only dtr Kelechi Card lives locally. Pt previously enjoyed reading, plants, playing bridge. Pt is wheelchair bound at baseline, was able to feed self but required assistance with other ADLs. Pt has had rapid decline in cognitive status over the past several months, with further decline over the past several days. Family had numerous questions re: what to expect moving forward, report pt can no longer return to [place], was given 30 days notice by facility. Family had already been in the process of exploring private caregivers and/or other facilities. Dtr shared that pt has been under the care of [facility] for the past two months; should pt enroll in hospice again, dtr expressed wish to use another agency. Family inquired about [facility]; criteria for [facility] was reviewed. Pt has AMD on file which appoints children Lashawn , Benjy Lemus, and Eris Martinez as joint medical POAs, all of whom must agree on decisions. Pt also has DDNR in place, copy has been scanned into medical record.     Psychosocial Issues Identified/ Resilience Factors:  Dtr indicated some frustration that the burden of pt's care rests primarily on her shoulders, as pt's other children live [place] of state. Dtr and ALCIRA do appear very proactive and realistic re: pt's care. Dtr has background in health care setting (managed Diabetes services at [address]). Goals of Care / Plan:  Further testing has been ordered to determine cause of agitation/altered mental status. Will continue to discuss goals of care based on pt's progress or lack thereof. Reviewed with Attending Physician, Neuro, and Care Manager. Thank you for including Palliative Medicine in the care of Ms. Georges.     [facility] YAMILEX Reilly, [facility]-  [phone])

## 2019-05-31 NOTE — PROGRESS NOTES
PT Note - Spoke with OT who checked with patient's RN this am. Patient unable to follow any commands and wearing hand mitts per RN. Per RN not appropriate for therapy services today. Per chart Ms Maia Azul is a 81 yo female  with a history of advanced dementia and especially worsening the last several months per family. Ms. Maia Azul can have periods of severe agitation to becoming violent at times. Recommend PT follow up on Monday 6/3/19.  Thank you   Kate Mi, PT, DPT

## 2019-05-31 NOTE — ED NOTES
Verbal shift change report given to David RN (oncoming nurse) by Chandra Myles (offgoing nurse). Report included the following information SBAR, ED Summary and MAR.

## 2019-05-31 NOTE — ED NOTES
Siderails padded for seizure precautions   Pt repositioned herself to left side  Family remains upset that pt is \"not upstairs in a room by now. This is ridiculous. \"

## 2019-05-31 NOTE — WOUND CARE
Wound care consult for skin tears present on admission. Resting with family at bedside- agitated with any disturbance-  Assessment discussed with staff nurse- anterior surface and lower legs assessed with disturbance. Skin turgor dry, thin and fragile with multiple scattered areas of purple discoloration and scabs present on admission. Dry resolving skin tears on bilateral lower arms, mitts on bilaterally. Heels and sacrum intact per staff nurse assessment on transfer. Left anterior lower leg- large irregular skin tear, dry with non viable edges- ~5x4 area, no drainage, mariya wound skin dry. Knees red with dry scabs present on admission. Low air loss bed obtained, wound care orders per Dr Codey Iraheta. Staff nurse to provide wound care when patient less agitated.   Will follow  Reconsult if needed  [address]

## 2019-06-01 NOTE — PROGRESS NOTES
6/1/2019  1:46 PM    ROSARIO advised by physician the family would like to use [facility]. CM contacted pt's daughter Shruthi Roy to confirm and advised [facility] while family member's are present in the patient's room. CM sent CC referral and called [facility], advised liaison will contact family to schedule consult.   STEWART Gerardo

## 2019-06-01 NOTE — PROGRESS NOTES
Dr. Shahid Gasca at bedside to discuss advanced care planning with family. Notified of current temperature. 1345 Call out to [facility] to request that the patient be evaluated for inpatient hospice until placement is found in a facility for outpatient long term care and hospice. Spoke with Flako Augustin and she will contact the family and then request that the liaison come to assess the patient for admission.

## 2019-06-01 NOTE — PROGRESS NOTES
[address]   [address]   [facility]    [id number]   [phone] Fax         Dementia/worsening agitation  D/w Dr Hanson Blunt  EEG unremarkable and c/w underlying dz  MRI no acute  Chart reviewed and pt being transitioned to hospice  Will return PRN    Gerry Albrecht MD

## 2019-06-01 NOTE — PROGRESS NOTES
Remains encephalopathic  Eyes closed  Persistent AF, rate controlled, on iv diltiazem  Brain MRI unremarkable  EEG nondx    Continue iv diltiazem until she can take po    [place] guarded    Marina Frye MD

## 2019-06-01 NOTE — PROGRESS NOTES
1900  Bedside and Verbal shift change report given to Bertha Merrill RN (oncoming nurse) by April RN (offgoing nurse). Report included the following information SBAR, Kardex, Procedure Summary, Intake/Output, MAR, Accordion and Recent Results. Patient on bed, resting. Initial assessment done. No signs of pain. Patients with mittens on both hands. Still due for MRI. Radiology can't give exact time. Will monitor closely. 1930  Patient in and out of a fib. EKG done. 1 result is A fib and the other is sinus tachy. Will keep on monitoring. 2145  Assisted patient to the MRI. Visit Vitals  /47   Pulse 85   Temp 97.7 °F (36.5 °C)   Resp 17   Ht 5' 4\" (1.626 m)   Wt 74.8 kg (165 lb)   SpO2 97%   BMI 28.32 kg/m²     0530  Incontinence care done. CHG bath done. 7516  Patient's Adult non verbal pain scale is at 8. PRN morphine given. 0700  Bedside and Verbal shift change report given to April RN (oncoming nurse) by Bertha Merrill RN (offgoing nurse). Report included the following information SBAR, Kardex, Procedure Summary, Intake/Output, MAR, Accordion and Recent Results.

## 2019-06-01 NOTE — PROGRESS NOTES
Patient requires the use of non-violent medical restraints due to: A medical device was removed or removal was attempted   Is confused, disoriented, agitated, fearful or hallucinating as a result of a medical or post-surgical condition, where alternative methods have failed to protect the patient from injury or harm.

## 2019-06-01 NOTE — HOSPICE
[employer] RN consultation visit note. Order for hospice noted. History and events of this hospitalization reviewed. TC to daughter Humberto Win at [phone]. Support given as tears were shared. Hospice philosophy and scope of services presented. Questions and concerns addressed. Patient had been resident at a [address] [facility] and Roula Doyle said [facility] won't be able to go back there. \"    We discussed the [facility] as an option. Advised Faith of the daily rate and of the limited amount of time a resident is able to stay-usually up to 3 weeks. Roula Kwon expressed understanding and said that her mother has the funds to cover the daily cost.    Above discussed with hospice medical director Dr. Geraldine Ovalles. Order received to have patient transferred to the [facility] tomorrow 6/2 under GIP level of care for uncontrolled symptoms of agitation. Discussed with hospice administrator on call Zaid Rubi who confirmed there is a bed available for patient tomorrow. TC to Dr. Gracia Card to advise of the above and  Dr. Gracia Card said patient could be discharged tomorrow 6/2/19. Daughter Roula Kwon advised of the above and appreciation expressed. Roula Kwon said she was at the dedication ceremony of the [facility]. Roula Kwon will discuss all of this with patient's other children this evening that live out of town. She is hoping that all the family members will be in agreement with this plan. Agreed to touch base tomorrow am to firm up plans   I advised her that a hospice employee will meet with her at [facility] tomorrow to have legal forms signed for the [facility] Benefit.      Understanding and appreciation expressed    Please call [phone] if questions or concerns    Samira Dial RN [facility]

## 2019-06-01 NOTE — PROGRESS NOTES
Spiritual Care Assessment/Progress Note  [address]      NAME: Shayy Sanchez      MRN: [id number]  AGE: 80 y.o. SEX: female  Evangelical Affiliation: Congregational   Language: English     6/1/2019     Total Time (in minutes): 37     Spiritual Assessment begun in [facility] [address] through conversation with:         [x]Patient        [x] Family    [] Friend(s)        Reason for Consult: Palliative Care, Initial/Spiritual Assessment     Spiritual beliefs: (Please include comment if needed)     [x] Identifies with a nidia tradition:   Congregational      [x] Supported by a nidia community:  Kate Ortiz          [] Claims no spiritual orientation:           [] Seeking spiritual identity:                [] Adheres to an individual form of spirituality:           [] Not able to assess:                           Identified resources for coping:      [] Prayer                               [] Music                  [] Guided Imagery     [] Family/friends                 [] Pet visits     [] Devotional reading                         [] Unknown     [] Other:                                           Interventions offered during this visit: (See comments for more details)    Patient Interventions: Iconic (affirming the presence of God/Higher Power), Prayer (assurance of), Prayer (actual)     Family/Friend(s):  Affirmation of emotions/emotional suffering, Affirmation of nidia, End of life issues discussed, Iconic (affirming the presence of God/Higher Power), Prayer (actual), Prayer (assurance of), Catharsis/review of pertinent events in supportive environment     Plan of Care:     [] Support spiritual and/or cultural needs    [] Support AMD and/or advance care planning process      [] Support grieving process   [x] Coordinate Rites and/or Rituals    [] Coordination with community clergy   [] No spiritual needs identified at this time   [] Detailed Plan of Care below (See Comments)  [] Make referral to Music Therapy  [] Make referral to Pet Therapy     [] Make referral to Addiction services  [] Make referral to [facility]  [] Make referral to Spiritual Care Partner  [] No future visits requested        [x] Follow up visits as needed     Comments: Initial Palliative care spiritual assessment in [facility]. Miss Maya Burgos was resting, her daughter who is retired from [employer] and her son in law were present. The affirmed their mom's Religious nidia and requested me to notify their Hoahaoism Henrik Dark that she is here. They shared about their mother's journey will illness which did not get bad until very recently. They are waiting to have a conversation with hospice as they consider their options. Ascencion Veras was reviewing her mom's AMD and it appears as if her mother would not want any heroic measures taken. Provided spiritual presence and prayer. With Gini's permission left a message on the Constant Contact phone for Pay[employer]. Advised of  Availability.   Visited by: Talia Benavides., MS., [address] ([id number])

## 2019-06-01 NOTE — HOSPICE
[facility]  [phone]    Inpatient Nursing PRE Admission   Patient Name: Vania So  YOB: 1924  Age: 80 y.o. Date of PLANNED Hospice Admission: 19 to the [address] Attending: Dr. Chika Gifford  Primary Care Physician: Savannah Smith MD     [facility]    ADVANCE CARE PLANNING    Code Status: DNR  Durable DNR: []  Yes  []  No  Advance Care Planning 2019   Patient's Healthcare Decision Maker is: Named in scanned ACP document   Confirm Advance Directive Yes, on file   Does the patient have other document types Do Not Resuscitate       Jainism: Yarsanism   Home:     Dionte Thomas   ER Visits/ Hospitalizations in past year:   Hospice Diagnosis:  End stage dementia with behavioral issues  Onset Date of Hospice Diagnosis: Dx 10 years ago yet most severe symptoms this year    Co-Morbidities:   Patient Active Problem List   Diagnosis Code    High cholesterol E78.00    Hypertension I10    Depression F32.9    Advanced care planning/counseling discussion Z70.80    Colon cancer ([facility] C18.9    Dementia F03.90    Atrial fibrillation with RVR ([facility] I48.91    Dehydration E86.0    Acute metabolic encephalopathy Y13.58    Confusion R41.0    Agitation R45.1    Physical debility R53.81    DNR (do not resuscitate) Z66     Diagnoses RELATED to the terminal prognosis: Agitation  Other Diagnoses:     Rationale for a prognosis of life expectancy of 6 months or less if the disease follows its normal course (Disease Specific History):   Patient with 10 year hx of dementia with several falls apx 5 years ago. Progressive decline with weight loss and marked agitaion requring physical and chemical restraints with some improvement since admission to [facility] on 19.     Medications given have been Haldon 5 mg IM x 3 on  and at 1600 on , Seroquel 25 mg tab bid yet has refused since , Morphine 10 mg po x 1 on  and x 1 on  and Morphine 2 mg IV x 2 on 5/31 and 3 on 6/1. Some improvement in behavior/agitation/confusion. Also with rapid atrial fibrillation at admit and was on Diltiazem nimco Bradley is a 80 y. o. who was admitted to [facility]. The patient's principle diagnosis of End Stage Dementia with Behavioral Issues has resulted in complete loss of appetite with agitation/confusion/restlessness. Functionally, the patient's Palliative Performance Scale has declined over a period of 6 months and is estimated at 20       bjective information that support this patients limited prognosis includes:     LABS AS BELOW    Ct Brain 5/30/19  FINDINGS:  Ventricles and cisterns are enlarged compatible with the overall degree of  volume loss,      The patient/family chose comfort measures with the support of Hospice. Patient meets for GIP   For uncontrolled agitation     Verbal certification of terminal diagnosis with life expectancy of 6 months or less received from: Dr. Eden Arabia: Allergies   Allergen Reactions    Latex Unknown (comments)     Pt unable to report      Codeine Unknown (comments)    Neosporin [Benzalkonium Chloride] Rash    Sulfa (Sulfonamide Antibiotics) Unknown (comments)    Sulfa Dyne Other (comments)     \"Crying jag\"       Wt Readings from Last 3 Encounters:   06/01/19 80.6 kg (177 lb 11.2 oz)   09/13/18 74.8 kg (165 lb)   09/07/18 74.8 kg (165 lb)       Body mass index is 30.5 kg/m².     Visit Vitals  /64 (BP 1 Location: Left arm, BP Patient Position: At rest;Hip tilt, left)   Pulse 99   Temp 98.5 °F (36.9 °C)   Resp 16   Ht 5' 4\" (1.626 m)   Wt 80.6 kg (177 lb 11.2 oz)   SpO2 96%   BMI 30.50 kg/m²       LAB VALUES  HGB 3.78  HGB 11.2   CA 8.3   BUN/CR RATIO 24   ANION GAP 3    Lab Results   Component Value Date/Time    Protein, total 6.8 05/30/2019 03:24 PM    Albumin 3.2 (L) 05/30/2019 03:24 PM       Currently this patient has: [x] Peripheral IV      Progression to DEPENDENCE WITH ADLs (include time frame): Dependent for assist with all ADLs    ASSESSMENT & PLAN     1. Symptom Issues Identified: Agitation     2. Spiritual Issues Identified: Daughter [address] has cared for her mother for the past 10 years. Other children are out of town    3. Psych/ Social/ Emotional Issues Identified:     4.   Care Coordination:   Transfer to: [facility]                 Report given to:     Transportation by:    Scheduled for     Medications Needs:     DME:     Supplies:

## 2019-06-01 NOTE — PROGRESS NOTES
Sherif Martel [facility]  [address] [facility], [place], [address] [place] [address]  [phone]      Medical Progress Note      NAME: Sumeet Baez   :  1924  MRM:  [id number]    Date/Time: 2019  10:52 AM       Assessment and Plan:   1. Atrial fibrillation with RVR / Hypertension - POA, likely prior hx of paroxysmal afib. TSH is normal. Check ECHO. Consult cardiology. Rate better controlled on diltiazem drip. Dementia is too great to consider anticoagulation     2. Acute metabolic encephalopathy / Dementia / Depression - Worsening for the last few months. doubt infection. Severe agitation and violence at worst. appreciated neurology and palliative care consult. Possible advanced dementia. Continue lexatpro, memantine, seroquel, trazodone and added haldol. Sitter if needed. Stop ativan. MRI of the brain is unremarkable for acute event. Pt remained less responsive and not eating.       3. Renal insufficiency/ Dehydration - POA due to poor intake. Resolved.       4. COPD - continue Singulair, Flonase, Pulmicort and albuterol       5. High cholesterol - stop all testing or treatment. 6.  Wound on both legs(POA). Wound care specialist evaluation      Code status: DNR           Subjective:     Chief Complaint:  Follow up of pt who was admitted with afib with RVR. Objective:     Last 24hrs VS reviewed since prior progress note.  Most recent are:    Visit Vitals  /78   Pulse 86   Temp 97.7 °F (36.5 °C)   Resp 16   Ht 5' 4\" (1.626 m)   Wt 80.6 kg (177 lb 11.2 oz)   SpO2 96%   BMI 30.50 kg/m²     SpO2 Readings from Last 6 Encounters:   19 96%   19 93%   18 96%   18 98%   18 97%   18 97%    O2 Flow Rate (L/min): 2 l/min       Intake/Output Summary (Last 24 hours) at 2019 0737  Last data filed at 2019 1706  Gross per 24 hour   Intake 1927.08 ml   Output    Net 1927.08 ml        Physical Exam:    Gen:  Well-developed, well-nourished, in no acute distress  HEENT:  Pink conjunctivae, PERRL, hearing intact to voice, moist mucous membranes  Neck:  Supple, without masses, thyroid non-tender  Resp:  No accessory muscle use, clear breath sounds without wheezes rales or rhonchi  Card:  No murmurs, normal S1, S2 without thrills, bruits or peripheral edema  Abd:  Soft, non-tender, non-distended, normoactive bowel sounds are present, no palpable organomegaly and no detectable hernias  Lymph:  No cervical or inguinal adenopathy  Musc:  No cyanosis or clubbing  Skin:  No rashes or ulcers, skin turgor is good  Neuro:  Unable to assess as pt unable to follow commands    Psych:  Pt disoriented.   __________________________________________________________________  Medications Reviewed: (see below)  Medications:     Current Facility-Administered Medications   Medication Dose Route Frequency    cephALEXin (KEFLEX) capsule 500 mg  500 mg Oral Q8H    enoxaparin (LOVENOX) injection 40 mg  40 mg SubCUTAneous 7am    morphine injection 2 mg  2 mg IntraVENous Q4H PRN    sodium chloride (NS) flush 5-10 mL  5-10 mL IntraVENous PRN    dilTIAZem (CARDIZEM) 125 mg in dextrose 5% 125 mL infusion  0-15 mg/hr IntraVENous TITRATE    escitalopram oxalate (LEXAPRO) tablet 10 mg  10 mg Oral DAILY    fluticasone propionate (FLONASE) 50 mcg/actuation nasal spray 2 Spray  2 Spray Both Nostrils DAILY    dextrose 5% - 0.45% NaCl with KCl 20 mEq/L infusion  75 mL/hr IntraVENous CONTINUOUS    haloperidol lactate (HALDOL) injection 5 mg  5 mg IntraMUSCular Q4H PRN    acetaminophen (TYLENOL) tablet 650 mg  650 mg Oral Q4H PRN    diphenhydrAMINE (BENADRYL) capsule 25 mg  25 mg Oral Q4H PRN    ondansetron (ZOFRAN) injection 4 mg  4 mg IntraVENous Q4H PRN    budesonide (PULMICORT) 500 mcg/2 ml nebulizer suspension  500 mcg Nebulization Q6HWA RT    memantine (NAMENDA) tablet 10 mg  10 mg Oral DAILY    mirabegron ER (MYRBETRIQ) tablet 25 mg  25 mg Oral DAILY    montelukast (SINGULAIR) tablet 10 mg  10 mg Oral DAILY    morphine (ROXANOL) 100 mg/5 mL (20 mg/mL) concentrated solution 10 mg  10 mg Oral Q2H PRN    predniSONE (DELTASONE) tablet 20 mg  20 mg Oral DAILY WITH BREAKFAST    QUEtiapine (SEROquel) tablet 25 mg  25 mg Oral BID    famotidine (PEPCID) tablet 20 mg  20 mg Oral DAILY        Lab Data Reviewed: (see below)  Lab Review:     Recent Labs     06/01/19  0108 05/30/19  1524   WBC 6.5 9.9   HGB 11.2* 13.5   HCT 36.3 42.9    273     Recent Labs     06/01/19  0108 05/30/19  1524    143   K 3.5 3.8   * 110*   CO2 28 26   * 133*   BUN 19 30*   CREA 0.80 1.01   CA 8.3* 9.1   ALB  --  3.2*   TBILI  --  0.4   SGOT  --  17   ALT  --  22     Lab Results   Component Value Date/Time    Glucose (POC) 119 (H) 05/30/2019 02:56 PM     No results for input(s): PH, PCO2, PO2, HCO3, FIO2 in the last 72 hours. No results for input(s): INR in the last 72 hours. No lab exists for component: Kit Just  All Micro Results     Procedure Component Value Units Date/Time    CULTURE, BLOOD [[id number]] Collected:  05/30/19 1537    Order Status:  Completed Specimen:  Blood Updated:  06/01/19 0531     Special Requests: NO SPECIAL REQUESTS        Culture result: NO GROWTH 2 DAYS       CULTURE, BLOOD [[id number]] Collected:  05/30/19 1524    Order Status:  Completed Specimen:  Blood Updated:  06/01/19 0531     Special Requests: NO SPECIAL REQUESTS        Culture result: NO GROWTH 2 DAYS       URINE CULTURE HOLD SAMPLE [[id number]] Collected:  05/30/19 1638    Order Status:  Canceled Specimen:  Urine from Serum Updated:  05/30/19 1644          I have reviewed notes of prior 24hr. Other pertinent lab:       Total time spent with patient: [address] discussed with: Family, Nursing Staff, Consultant/Specialist and >50% of time spent in counseling and coordination of care    Discussed:  Care Plan    Prophylaxis:  Lovenox    Disposition: SNF/LTC           ___________________________________________________    Attending Physician: Elizabeth Moe MD

## 2019-06-01 NOTE — PROGRESS NOTES
Spoke with pt's daughter Mrs Lucia Lomax, who requested to speak to hospice.  Will consult hospice care

## 2019-06-02 NOTE — ROUTINE PROCESS
Bedside shift change report given to  April (oncoming nurse) by Lucia Rees (offgoing nurse). Report included the following information SBAR, Kardex, Intake/Output, MAR, Accordion and Recent Results.

## 2019-06-02 NOTE — PROGRESS NOTES
On-call MD made aware patient febrile 101.3 Axillary. Tylenol ordered urine  Ua and Culture ordered. Tylenol supp given instead of oral route. Patient spits meds out. Patient prepped for clean catch urine for Ua and culture. Medicated for pain, B/P elevated and agitation. Repositioned per Turn Team.    Bedside, Verbal and  shift change report given to GUY Small RN (oncoming nurse) by Lupe Nicole (offgoing nurse). Report included the following information SBAR, Kardex, Intake/Output, MAR, Recent Results and Cardiac Rhythm sinus arrythmia.

## 2019-06-02 NOTE — DISCHARGE INSTRUCTIONS
ACUTE DIAGNOSES:  Atrial fibrillation with RVR ([facility] [I48.91]    CHRONIC MEDICAL DIAGNOSES:  Problem List as of 6/2/2019 Date Reviewed: 5/30/2019          Codes Class Noted - Resolved    Confusion ICD-10-CM: R41.0  ICD-9-CM: 298.9  Unknown - Present        Agitation ICD-10-CM: R45.1  ICD-9-CM: 307.9  Unknown - Present        Physical debility ICD-10-CM: R53.81  ICD-9-CM: 799.3  Unknown - Present        DNR (do not resuscitate) ICD-10-CM: Z66  ICD-9-CM: V49.86  Unknown - Present        Colon cancer ([facility] ICD-10-CM: C18.9  ICD-9-CM: 153.9  5/30/2019 - Present        Dementia ICD-10-CM: F03.90  ICD-9-CM: 294.20  5/30/2019 - Present        * (Principal) Atrial fibrillation with RVR ([facility] ICD-10-CM: I48.91  ICD-9-CM: 427.31  5/30/2019 - Present        Dehydration ICD-10-CM: E86.0  ICD-9-CM: 276.51  5/30/2019 - Present        Acute metabolic encephalopathy YZJ-83-NJ: G93.41  ICD-9-CM: 348.31  5/30/2019 - Present        Advanced care planning/counseling discussion ICD-10-CM: Z71.89  ICD-9-CM: V65.49  7/14/2016 - Present    Overview Signed 7/14/2016 10:02 AM by Marvin Mckeon RN     A copy of patient's AMD is on file. NN reviewed document with patient & patient denies changes to the document. Depression ICD-10-CM: F32.9  ICD-9-CM: 641  Unknown - Present        High cholesterol ICD-10-CM: E78.00  ICD-9-CM: 272.0  Unknown - Present        Hypertension ICD-10-CM: I10  ICD-9-CM: 401.9  Unknown - Present        RESOLVED: Multiple skin tears ICD-10-CM: T14. 8XXA  ICD-9-CM: 879.8  5/30/2019 - 5/30/2019        RESOLVED: Hypoxia ICD-10-CM: R09.02  ICD-9-CM: 799.02  5/30/2019 - 5/30/2019        RESOLVED: SOB (shortness of breath) ICD-10-CM: R06.02  ICD-9-CM: 786.05  8/15/2017 - 5/30/2019        RESOLVED: Abnormal EKG ICD-10-CM: R94.31  ICD-9-CM: 794.31  8/15/2017 - 5/30/2019        RESOLVED: Thoracic compression fracture ([facility] ICD-10-CM: S22.000A  ICD-9-CM: 805.2  2/21/2017 - 5/30/2019        RESOLVED: Rectus sheath hematoma ICD-10-CM: S30. 1XXA  ICD-9-CM: 922.2  2/20/2017 - 5/30/2019        RESOLVED: Hematoma (nontraumatic) of breast ICD-10-CM: N64.89  ICD-9-CM: 611.89  Unknown - 5/30/2019    Overview Signed 6/6/2013  7:52 PM by Hobson Krauss, MD Dr. Modesta Claude cox breast surgeon             RESOLVED: Osteoporosis ICD-10-CM: M81.0  ICD-9-CM: 733.00  Unknown - 5/30/2019    Overview Signed 6/6/2013  7:52 PM by MD Dr. Alvaro Ellis fall fractured vertebrae             RESOLVED: Cancer ([facility] ICD-10-CM: C80.1  ICD-9-CM: 199.1  Unknown - 5/30/2019    Overview Signed 6/5/2013 10:24 PM by Lucrecia Al MD     colon CA in situ             RESOLVED: Psychiatric disorder ICD-10-CM: F99  ICD-9-CM: 300.9  Unknown - 5/30/2019    Overview Signed 6/5/2013 10:24 PM by Lucrecia Al MD     depression             RESOLVED: Compression fracture of L1 lumbar vertebra ([facility] ICD-10-CM: S32.010A  ICD-9-CM: 805.4  8/26/2012 - 5/30/2019        RESOLVED: Back pain ICD-10-CM: M54.9  ICD-9-CM: 724.5  8/26/2012 - 5/30/2019        RESOLVED: Fall at home (Chronic) ICD-10-CM: W60. [place]  ICD-9-CM: [id number], E849.0  8/26/2012 - 5/30/2019        RESOLVED: Arthritis (Chronic) ICD-10-CM: M19.90  ICD-9-CM: 716.90  8/26/2012 - 5/30/2019        RESOLVED: Essential hypertension (Chronic) ICD-10-CM: I10  ICD-9-CM: 401.9  8/26/2012 - 5/30/2019        RESOLVED: Dysthymia (Chronic) ICD-10-CM: F34.1  ICD-9-CM: 300.4  8/26/2012 - 5/30/2019              DISCHARGE MEDICATIONS:          · It is important that you take the medication exactly as they are prescribed. · Keep your medication in the bottles provided by the pharmacist and keep a list of the medication names, dosages, and times to be taken in your wallet. · Do not take other medications without consulting your doctor.        DIET:  NPO    ACTIVITY: Bedrest    ADDITIONAL INFORMATION: If you experience any of the following symptoms then please call your primary care physician or return to the emergency room if you cannot get hold of your doctor: Fever, chills, nausea, vomiting, diarrhea, change in mentation, falling, bleeding, shortness of breath. FOLLOW UP CARE:              Information obtained by :  I understand that if any problems occur once I am at home I am to contact my physician. I understand and acknowledge receipt of the instructions indicated above.                                                                                                                                            Physician's or R.N.'s Signature                                                                  Date/Time                                                                                                                                              Patient or Representative Signature                                                          Date/Time

## 2019-06-02 NOTE — PROGRESS NOTES
1220 Patient arrived from [facility] via [facility]. Daughter arrived with patient. Patient is groaning and grimacing. Breathing is labored. Unable to assess for orientation. Patient not verbalizing beyond groans. Patient is restless and agitated. Appears to be hallucinating. Frequent itching of abdomen. 1230 Assessment completed see comp assess 1,2,3.   2238 Called to received orders from provider.  Patient had temp of 100.7 - Toradol administered. Patient administered morphine for labored breathing and grimacing. Haldol administered for psychosis. Diphenhydramine given for itching. 1330 Patient resting quietly in bed. Appears to be sleeping. Non-labored breathing. No signs of distress. Family at bedside. 1430 Family at bedside with patient. Television on in room. 1530 Temp 98.6.   1605 Patient having audible secretions. Robinul administered per MAR. Patient is resting in bed. No indication of pain. 1700 Daughter at bedside with patient. 1735 Patient groaning and grimacing. Morphine administered per [facility]  1805 Patient resting in bed. No signs of pain nor distress.

## 2019-06-02 NOTE — PROGRESS NOTES
Sherif Martel [facility]  [address] [facility], [place], [id number] [place] [address]  [phone]      Medical Progress Note      NAME: Chato Mary   :  1924  MRM:  [id number]    Date/Time: 2019  10:52 AM       Assessment and Plan:   Has extensive discussion with daughter yesterday about pt's condition and she is in agreement for hospice care. Hospice nurse came and talk to her and pt will be admitted to hospice house today. Will stop all treatment except comfort measures. Atrial fibrillation with RVR / Hypertension - POA, likely prior hx of paroxysmal afib. TSH is normal. Evaluated by cardiology. Was on diltiazem drip.       Acute metabolic encephalopathy / Dementia / Depression - Worsening for the last few months. Severe agitation and violence at worst. appreciated neurology and palliative care consult. Possible advanced dementia. Continue lexatpro, memantine, seroquel, trazodone and added haldol. Sitter if needed. Stop ativan. MRI of the brain is unremarkable for acute event. Pt remained less responsive and not eating. Now developed fever. She might have CNS process, but daughter requested hospice care and wouldn't do further workup.      Renal insufficiency/ Dehydration - POA due to poor intake. Resolved. COPD - continue Singulair, Flonase, Pulmicort and albuterol       High cholesterol - stop all testing or treatment.          Code status: DNR           Subjective:     Chief Complaint:  Follow up of pt who was admitted with afib with RVR. Objective:     Last 24hrs VS reviewed since prior progress note.  Most recent are:    Visit Vitals  /41   Pulse 85   Temp 97.5 °F (36.4 °C)   Resp 22   Ht 5' 4\" (1.626 m)   Wt 80.6 kg (177 lb 11.2 oz)   SpO2 96%   BMI 30.50 kg/m²     SpO2 Readings from Last 6 Encounters:   19 96%   19 93%   18 96%   18 98%   18 97%   18 97%    O2 Flow Rate (L/min): 2 l/min       Intake/Output Summary (Last 24 hours) at 6/2/2019 0522  Last data filed at 6/1/2019 2254  Gross per 24 hour   Intake 1278 ml   Output 100 ml   Net 1178 ml        Physical Exam:    Gen:  Well-developed, well-nourished, in no acute distress  HEENT:  Pink conjunctivae, PERRL, hearing intact to voice, moist mucous membranes  Neck:  Supple, without masses, thyroid non-tender  Resp:  No accessory muscle use, clear breath sounds without wheezes rales or rhonchi  Card:  No murmurs, normal S1, S2 without thrills, bruits or peripheral edema  Abd:  Soft, non-tender, non-distended, normoactive bowel sounds are present, no palpable organomegaly and no detectable hernias  Lymph:  No cervical or inguinal adenopathy  Musc:  No cyanosis or clubbing  Skin:  No rashes or ulcers, skin turgor is good  Neuro:  Unable to assess as pt unable to follow commands    Psych:  Pt disoriented.   __________________________________________________________________  Medications Reviewed: (see below)  Medications:     Current Facility-Administered Medications   Medication Dose Route Frequency    cefTRIAXone (ROCEPHIN) 1 gram injection        ketorolac (TORADOL) 30 mg/mL (1 mL) injection        0.9% sodium chloride (MBP/ADV) infusion        ADDaptor        diphenhydrAMINE (BENADRYL) injection 25 mg  25 mg IntraVENous Q4H PRN    acetaminophen (TYLENOL) suppository 650 mg  650 mg Rectal Q4H PRN    enoxaparin (LOVENOX) injection 40 mg  40 mg SubCUTAneous 7am    morphine injection 2 mg  2 mg IntraVENous Q4H PRN    sodium chloride (NS) flush 5-10 mL  5-10 mL IntraVENous PRN    escitalopram oxalate (LEXAPRO) tablet 10 mg  10 mg Oral DAILY    fluticasone propionate (FLONASE) 50 mcg/actuation nasal spray 2 Spray  2 Spray Both Nostrils DAILY    haloperidol lactate (HALDOL) injection 5 mg  5 mg IntraMUSCular Q4H PRN    ondansetron (ZOFRAN) injection 4 mg  4 mg IntraVENous Q4H PRN    budesonide (PULMICORT) 500 mcg/2 ml nebulizer suspension  500 mcg Nebulization Q6HWA RT    morphine (ROXANOL) 100 mg/5 mL (20 mg/mL) concentrated solution 10 mg  10 mg Oral Q2H PRN    QUEtiapine (SEROquel) tablet 25 mg  25 mg Oral BID    famotidine (PEPCID) tablet 20 mg  20 mg Oral DAILY        Lab Data Reviewed: (see below)  Lab Review:     Recent Labs     06/01/19  0108 05/30/19  1524   WBC 6.5 9.9   HGB 11.2* 13.5   HCT 36.3 42.9    273     Recent Labs     06/01/19  0108 05/30/19  1524    143   K 3.5 3.8   * 110*   CO2 28 26   * 133*   BUN 19 30*   CREA 0.80 1.01   CA 8.3* 9.1   ALB  --  3.2*   TBILI  --  0.4   SGOT  --  17   ALT  --  22     Lab Results   Component Value Date/Time    Glucose (POC) 119 (H) 05/30/2019 02:56 PM     No results for input(s): PH, PCO2, PO2, HCO3, FIO2 in the last 72 hours. No results for input(s): INR in the last 72 hours. No lab exists for component: Benito Royal  All Micro Results     Procedure Component Value Units Date/Time    CULTURE, BLOOD [[id number]] Collected:  05/30/19 1537    Order Status:  Completed Specimen:  Blood Updated:  06/02/19 0605     Special Requests: NO SPECIAL REQUESTS        Culture result: NO GROWTH 3 DAYS       CULTURE, BLOOD [[id number]] Collected:  05/30/19 1524    Order Status:  Completed Specimen:  Blood Updated:  06/02/19 0605     Special Requests: NO SPECIAL REQUESTS        Culture result: NO GROWTH 3 DAYS       CULTURE, URINE [[id number]] Collected:  06/02/19 0023    Order Status:  Completed Updated:  06/02/19 0105    URINE CULTURE HOLD SAMPLE [[id number]] Collected:  05/30/19 1638    Order Status:  Canceled Specimen:  Urine from Serum Updated:  05/30/19 1644          I have reviewed notes of prior 24hr. Other pertinent lab:       Total time spent with patient: [address] discussed with: Family, Nursing Staff, Consultant/Specialist and >50% of time spent in counseling and coordination of care    Discussed:  Care Plan    Prophylaxis:  Lovenox    Disposition: SNF/LTC           ___________________________________________________    Attending Physician: Joseph Krabbe, MD

## 2019-06-02 NOTE — DISCHARGE SUMMARY
Hospitalist Discharge Summary     Patient ID:    Martha Miller  [id number]  49 y.o.  11/18/1924    Admit date: 5/30/2019    Discharge date and time: 6/2/2019    Admission Diagnoses: Atrial fibrillation with RVR ([facility] [I48.91]    Chronic Diagnoses:    Problem List as of 6/2/2019 Date Reviewed: 5/30/2019          Codes Class Noted - Resolved    Confusion ICD-10-CM: R41.0  ICD-9-CM: 298.9  Unknown - Present        Agitation ICD-10-CM: R45.1  ICD-9-CM: 307.9  Unknown - Present        Physical debility ICD-10-CM: R53.81  ICD-9-CM: 799.3  Unknown - Present        DNR (do not resuscitate) ICD-10-CM: Z66  ICD-9-CM: V49.86  Unknown - Present        Colon cancer ([facility] ICD-10-CM: C18.9  ICD-9-CM: 153.9  5/30/2019 - Present        Dementia ICD-10-CM: F03.90  ICD-9-CM: 294.20  5/30/2019 - Present        * (Principal) Atrial fibrillation with RVR ([facility] ICD-10-CM: I48.91  ICD-9-CM: 427.31  5/30/2019 - Present        Dehydration ICD-10-CM: E86.0  ICD-9-CM: 276.51  5/30/2019 - Present        Acute metabolic encephalopathy AZT-95-LT: G93.41  ICD-9-CM: 348.31  5/30/2019 - Present        Advanced care planning/counseling discussion ICD-10-CM: Z71.89  ICD-9-CM: V65.49  7/14/2016 - Present    Overview Signed 7/14/2016 10:02 AM by Carlota Shaw RN     A copy of patient's AMD is on file. NN reviewed document with patient & patient denies changes to the document. Depression ICD-10-CM: F32.9  ICD-9-CM: 779  Unknown - Present        High cholesterol ICD-10-CM: E78.00  ICD-9-CM: 272.0  Unknown - Present        Hypertension ICD-10-CM: I10  ICD-9-CM: 401.9  Unknown - Present        RESOLVED: Multiple skin tears ICD-10-CM: T14. 8XXA  ICD-9-CM: 879.8  5/30/2019 - 5/30/2019        RESOLVED: Hypoxia ICD-10-CM: R09.02  ICD-9-CM: 799.02  5/30/2019 - 5/30/2019        RESOLVED: SOB (shortness of breath) ICD-10-CM: R06.02  ICD-9-CM: 786.05  8/15/2017 - 5/30/2019        RESOLVED: Abnormal EKG ICD-10-CM: R94.31  ICD-9-CM: 794.31 8/15/2017 - 5/30/2019        RESOLVED: Thoracic compression fracture ([facility] ICD-10-CM: S22.000A  ICD-9-CM: 805.2  2/21/2017 - 5/30/2019        RESOLVED: Rectus sheath hematoma ICD-10-CM: S30. 1XXA  ICD-9-CM: 922.2  2/20/2017 - 5/30/2019        RESOLVED: Hematoma (nontraumatic) of breast ICD-10-CM: N64.89  ICD-9-CM: 611.89  Unknown - 5/30/2019    Overview Signed 6/6/2013  7:52 PM by Erla Euler, MD Dr. Thalia Boxer cox breast surgeon             RESOLVED: Osteoporosis ICD-10-CM: M81.0  ICD-9-CM: 733.00  Unknown - 5/30/2019    Overview Signed 6/6/2013  7:52 PM by MD Dr. Radha Bonilla fall fractured vertebrae             RESOLVED: Cancer ([facility] ICD-10-CM: C80.1  ICD-9-CM: 199.1  Unknown - 5/30/2019    Overview Signed 6/5/2013 10:24 PM by Aurora Baldwin MD     colon CA in situ             RESOLVED: Psychiatric disorder ICD-10-CM: F99  ICD-9-CM: 300.9  Unknown - 5/30/2019    Overview Signed 6/5/2013 10:24 PM by Aurora Baldwin MD     depression             RESOLVED: Compression fracture of L1 lumbar vertebra ([facility] ICD-10-CM: S32.010A  ICD-9-CM: 805.4  8/26/2012 - 5/30/2019        RESOLVED: Back pain ICD-10-CM: M54.9  ICD-9-CM: 724.5  8/26/2012 - 5/30/2019        RESOLVED: Fall at home (Chronic) ICD-10-CM: M95. [place], [place]  ICD-9-CM: [id number], E849.0  8/26/2012 - 5/30/2019        RESOLVED: Arthritis (Chronic) ICD-10-CM: M19.90  ICD-9-CM: 716.90  8/26/2012 - 5/30/2019        RESOLVED: Essential hypertension (Chronic) ICD-10-CM: I10  ICD-9-CM: 401.9  8/26/2012 - 5/30/2019        RESOLVED: Dysthymia (Chronic) ICD-10-CM: F34.1  ICD-9-CM: 300.4  8/26/2012 - 5/30/2019              Discharge Medications:   Current Discharge Medication List      STOP taking these medications       albuterol (PROVENTIL VENTOLIN) 2.5 mg /3 mL (0.083 %) nebulizer solution Comments:   Reason for Stopping:         cetirizine (ZYRTEC) 10 mg tablet Comments:   Reason for Stopping:         cephALEXin (KEFLEX) 500 mg capsule Comments:   Reason for Stopping:         budesonide (PULMICORT) 0.5 mg/2 mL nbsp Comments:   Reason for Stopping:         Flaxseed Oil oil Comments:   Reason for Stopping:         lanolin alcohol-mineral oil-petrolatum (EUCERIN) topical cream Comments:   Reason for Stopping:         LORazepam (ATIVAN) 0.5 mg tablet Comments:   Reason for Stopping:         memantine (NAMENDA) 10 mg tablet Comments:   Reason for Stopping:         mirabegron ER (MYRBETRIQ) 25 mg ER tablet Comments:   Reason for Stopping:         predniSONE (DELTASONE) 20 mg tablet Comments:   Reason for Stopping:         QUEtiapine (SEROQUEL) 25 mg tablet Comments:   Reason for Stopping:         raNITIdine (ZANTAC) 150 mg tablet Comments:   Reason for Stopping:         traZODone (DESYREL) 150 mg tablet Comments:   Reason for Stopping:         albuterol (PROVENTIL VENTOLIN) 2.5 mg /3 mL (0.083 %) nebulizer solution Comments:   Reason for Stopping:         morphine (ROXANOL) 100 mg/5 mL (20 mg/mL) concentrated solution Comments:   Reason for Stopping:         LORazepam (INTENSOL) 2 mg/mL concentrated solution Comments:   Reason for Stopping:         montelukast (SINGULAIR) 10 mg tablet Comments:   Reason for Stopping:         acetaminophen (TYLENOL EXTRA STRENGTH) 500 mg tablet Comments:   Reason for Stopping:         guaiFENesin ER (MUCINEX) 600 mg ER tablet Comments:   Reason for Stopping:         diphenhydrAMINE (BENADRYL ALLERGY) 12.5 mg/5 mL syrup Comments:   Reason for Stopping:         fluticasone (FLONASE) 50 mcg/actuation nasal spray Comments:   Reason for Stopping:         traMADol (ULTRAM) 50 mg tablet Comments:   Reason for Stopping:         therapeutic multivitamin (THERAGRAN) tablet Comments:   Reason for Stopping:         escitalopram oxalate (LEXAPRO) 10 mg tablet Comments:   Reason for Stopping:         Cholecalciferol, Vitamin D3, 1,000 unit cap Comments:   Reason for Stopping: Follow up Care:    1. Savannah Smith MD    2. Diet:  NPO    Disposition:  Hospice . Advanced Directive:    Discharge Exam:  See today's note. CONSULTATIONS: Cardiology, Neurology, Palliative Care and Hospice    Significant Diagnostic Studies:   Recent Labs     06/01/19  0108 05/30/19  1524   WBC 6.5 9.9   HGB 11.2* 13.5   HCT 36.3 42.9    273     Recent Labs     06/01/19  0108 05/30/19  1524    143   K 3.5 3.8   * 110*   CO2 28 26   BUN 19 30*   CREA 0.80 1.01   * 133*   CA 8.3* 9.1     Recent Labs     05/30/19  1524   SGOT 17   ALT 22   AP 66   TBILI 0.4   TP 6.8   ALB 3.2*   GLOB 3.6     No results for input(s): INR, PTP, APTT in the last 72 hours. No lab exists for component: INREXT   No results for input(s): FE, TIBC, PSAT, FERR in the last 72 hours. No results for input(s): PH, PCO2, PO2 in the last 72 hours. No results for input(s): CPK, CKMB in the last 72 hours. No lab exists for component: TROPONINI  Lab Results   Component Value Date/Time    Glucose (POC) 119 (H) 05/30/2019 02:56 PM             HOSPITAL COURSE & TREATMENT RENDERED:   Pt continue to be confused, agitated and not eating. Had extensive discussion with daughter yesterday about pt's condition and she is in agreement for hospice care. Hospice nurse came and talk to her and pt will be admitted to hospice house today. Will stop all treatment except comfort measures.       Atrial fibrillation with RVR / Hypertension - POA, likely prior hx of paroxysmal afib. TSH is normal. Evaluated by cardiology. Was on diltiazem drip, which was stopped.       Acute metabolic encephalopathy / Dementia / Depression - Worsening for the last few months. Severe agitation and violence at [facility] neurology and palliative care consult. Possible advanced dementia. Continue lexatpro, memantine, seroquel, trazodone and added haldol. Sitter if needed. Stop ativan. MRI of the brain is unremarkable for acute event.  Pt remained less responsive and not eating. Now developed fever. She might have CNS process, but daughter requested hospice care and wouldn't do further workup.      Renal insufficiency/ Dehydration - POA due to poor intake.  Resolved.       COPD - continue Singulair, Flonase, Pulmicort and albuterol       High cholesterol - stop all testing or treatment.           Code status: DNR           Signed:  Nguyen Womack MD  6/2/2019  7:17 AM

## 2019-06-02 NOTE — H&P
[employer]   [facility] Help to Those in Need  [phone]    Patient Name: Shayy Sanchez  YOB: 1924    Date of Provider Hospice Visit: 06/02/19    Level of Care:   [x] General Inpatient (GIP)    [] Routine   [] Respite    Current Location of Care:  [] [facility] [] [facility] [] [facility] [] [facility] [x] Hospice House THE [facility], patient referred from:  [] [facility] [x] [facility] [] [facility] [] [facility] [] Home [] Other:     Date of Original Hospice Admission: 06/02/19  Hospice Medical Director at time of admission: [address] [place] [address] Diagnosis: Senile degeneration of brain  Diagnoses RELATED to the terminal prognosis: Dementia with behavioral issues,   Other Diagnoses: HTN, depression, Atrial fibrillation     HOSPICE SUMMARY   Do not cut and paste chart information other than imaging findings    Shayy Sanchez is a 80y.o. year old who was admitted to [facility]. The patient's principle diagnosis has resulted in further progression of dementia associated with behavioral manifestations and decline in function. Pt recently admitted with increased agitation requiring physical and chemical restraints that led to some stabilization. Pt also not eating and drinking now and has incurred weight loss. Refer to LCD     Functionally, the patient's Karnofsky and/or Palliative Performance Scale has declined over a period of days and is estimated at 20-30%. The patient is dependent on the following ADLs:all    Objective information that support this patients limited prognosis includes: CT Brain 5/30/19  FINDINGS:  Ventricles and cisterns are enlarged compatible with the overall degree of  volume loss    The patient/family chose comfort measures with the support of Hospice. HOSPICE DIAGNOSES   Active Symptoms:  1. Labored breathing  2. Diffuse pain  3. Fever  4. Restlessness/agitation/confusion/delirium  5. Dementia with behavioral disturbances  6.  Pruritus  7 Increased oropharyngeal secretions     PLAN 1. Admit GIP as pt symptomatic and needing close monitoring and active management  2. Robinul 0.2mg IV every 4 hours as needed for secretions  3. Toradol 30mg IV every 6 hours as needed for fever  4. Haldol 2mg IV every 4 hours as needed for agitation  5. Benadryl 25mg IV every 4 hours as needed for itching  6. Morphine 2mg IV every 15mts as needed for pain/dyspnea    7.  and SW to support family needs  8. Disposition: pt will be switched to routine care once symptoms are stable    Prognosis estimated based on 06/02/19 clinical assessment is:   [] Hours to Days    [x] Days to Weeks    [] Other:    Communicated plan of care with: Hospice Case Manager; Hospice IDT; Care Team     GOALS OF CARE     Patient/Medical POA stated Goal of Care:     [] I have reviewed and/or updated ACP information in the Advance Care Planning Navigator. This information is available in the [address] link in the patient's chart header. Primary Decision [facility] Agent):   Primary Decision Maker: Faith Moser - Daughter - [phone]    Primary Decision Maker: Marie Rodriguez - Daughter    Primary Decision Maker: Laurel Ji., Sahra Hand - Son    Resuscitation Status: DNR  If DNR is there a Durable DNR on file? : [x] Yes [] No (If no, complete Durable DNR)    HISTORY     History obtained from: daughter Lawrance Friend, son in law Shweta Matthew, pt opens eyes and signs but unable to respond    CHIEF COMPLAINT: N/A  The patient is:   [] Verbal  [x] Nonverbal  [] Unresponsive    HPI/SUBJECTIVE:  Pt appears to be having deep heavy breathing, intermittently signs due to pain.  Also some increased airway secretions       REVIEW OF SYSTEMS     The following systems were: [] reviewed  [x] unable to be reviewed       Adult Non-Verbal Pain Assessment Score: 5    Face  [] 0   No particular expression or smile  [x] 1   Occasional grimace, tearing, frowning, wrinkled forehead  [] 2   Frequent grimace, tearing, frowning, wrinkled forehead    Activity (movement)  [] 0   Lying quietly, normal position  [x] 1   Seeking attention through movement or slow, cautious movement  [] 2   Restless, excessive activity and/or withdrawal reflexes    Guarding  [x] 0   Lying quietly, no positioning of hands over areas of body  [] 1   Splinting areas of the body, tense  [] 2   Rigid, stiff    Physiology (vital signs)  [] 0   Stable vital signs  [x] 1   Change in any of the following: SBP > 20mm Hg; HR > 20/minute  [] 2   Change in any of the following: SBP > 30mm Hg; HR > 25/minute    Respiratory  [] 0   Baseline RR/SpO2, compliant with ventilator  [] 1   RR > 10 above baseline, or 5% drop SpO2, mild asynchrony with ventilator  [x] 2   RR > 20 above baseline, or 10% drop SpO2, asynchrony with ventilator     FUNCTIONAL ASSESSMENT     Palliative Performance Scale (PPS):30%       PSYCHOSOCIAL/SPIRITUAL ASSESSMENT     Active Problems:    * No active hospital problems.  *    Past Medical History:   Diagnosis Date    Arthritis     Arthritis     Dr. Juan Mitchell [facility])    [facility])     colon CA in situ    Colon cancer ([facility] [address]     Dementia     Depression     Hematoma (nontraumatic) of breast     Dr. Finesse freeman breast surgeon    High cholesterol     Hypertension     Osteoporosis     Osteoporosis     Dr. 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    Umbilical hernia       Past Surgical History:   Procedure Laterality Date    HX COLECTOMY      HX HEENT      cateracts bilateral    HX HIP REPLACEMENT  2009    Right    HX ORTHOPAEDIC  2012    T11 & L1 vertebrae fx    HX PELVIC FRACTURE TX  2010      Social History     Tobacco Use    Smoking status: Former Smoker     Packs/day: 0.50     Last attempt to quit: 1970     Years since quittin.4    Smokeless tobacco: Never Used   Substance Use Topics    Alcohol use: No     Frequency: Never     Family History   Problem Relation Age of Onset    Heart Disease Mother       Allergies   Allergen Reactions    Latex Unknown (comments)     Pt unable to report      Codeine Unknown (comments)    Neosporin [Benzalkonium Chloride] Rash    Sulfa (Sulfonamide Antibiotics) Unknown (comments)    Sulfa Dyne Other (comments)     \"Crying jag\"      Current Facility-Administered Medications   Medication Dose Route Frequency    bisacodyl (DULCOLAX) suppository 10 mg  10 mg Rectal DAILY PRN    haloperidol lactate (HALDOL) injection 2 mg  2 mg IntraVENous Q4H PRN    ketorolac (TORADOL) injection 30 mg  30 mg IntraVENous Q6H PRN    morphine injection 2 mg  2 mg IntraVENous Q15MIN PRN    diphenhydrAMINE (BENADRYL) injection 25 mg  25 mg IntraVENous Q4H PRN    glycopyrrolate (ROBINUL) injection 0.2 mg  0.2 mg IntraVENous Q4H PRN        PHYSICAL EXAM     Wt Readings from Last 3 Encounters:   06/01/19 80.6 kg (177 lb 11.2 oz)   09/13/18 74.8 kg (165 lb)   09/07/18 74.8 kg (165 lb)       Visit Vitals  /60   Pulse 95   Temp (!) 100.7 °F (38.2 °C)   Resp 24   SpO2 91%       Supplemental O2  [] Yes  [] NO  Last bowel movement:     Currently this patient has:  [x] Peripheral IV [] PICC  [] PORT [] ICD    [] Sánchez Catheter [] NG Tube   [] PEG Tube    [] Rectal Tube [] Drain  [] Other:     Constitutional:lethargic, in mild distress  Eyes: pallor  ENMT: some increased airway secretions  Cardiovascular: slightly tachycardic  Respiratory: labored breathing  Gastrointestinal: distension, non tender, bowel sounds Ok  Musculoskeletal:diffuse pain  Skin:diffuse eczema, areas of skin breakdown left leg/shin covered with wrap, warm to touch  Neurologic:lethargic, no other obvious deficits  Psychiatric: some anxiety  Other:       Pertinent Lab and or Imaging Tests:  Lab Results   Component Value Date/Time    Sodium 144 06/01/2019 01:08 AM    Potassium 3.5 06/01/2019 01:08 AM    Chloride 113 (H) 06/01/2019 01:08 AM    CO2 28 06/01/2019 01:08 AM    Anion gap 3 (L) 06/01/2019 01:08 AM    Glucose 130 (H) 06/01/2019 01:08 AM    BUN 19 06/01/2019 01:08 AM    Creatinine 0.80 06/01/2019 01:08 AM    BUN/Creatinine ratio 24 (H) 06/01/2019 01:08 AM    GFR est AA >60 06/01/2019 01:08 AM    GFR est non-AA >60 06/01/2019 01:08 AM    Calcium 8.3 (L) 06/01/2019 01:08 AM     Lab Results   Component Value Date/Time    Protein, total 6.8 05/30/2019 03:24 PM    Albumin 3.2 (L) 05/30/2019 03:24 PM           Total time: 70mts  Counseling / coordination time: 50mts  > 50% counseling / coordination?: yes

## 2019-06-02 NOTE — ROUTINE PROCESS
0023- urine reflex and culture obtained and sent to lab. Patient does not open eyes, and moans when moved. Temp 101.1 axillary. Rectal tylenol given at 2200. Will monitor patient. Blake Vigil Notified Dr. Maryjo Vargas urine with 4+ bacteria and continued fever.  Orders to be placed by MD.      1791- patient low BP- notified Dr. Maryjo Vargas ordered 1 liter bolus to maintain BP- if unable to maintain BP with fluids notify family per MD    2057- rechecked BP after turning patient /66    0240- MAP in the 50's- started bolus

## 2019-06-02 NOTE — PROGRESS NOTES
Problem: Pain  Goal: Verbalize satisfaction of level of comfort and symptom control  Outcome: Not Met  Note:   Monitor and assess for pain. Reposition as needed. Medicate when appropriate. Problem: Anxiety/Agitation  Goal: Verbalize and demonstrate ability to manage anxiety  Description  The patient/family/caregiver will verbalize and demonstrate ability to manage the patient's anxiety throughout hospice care. Outcome: Not Met  Note:   Assess for anxiety/agitation. Utilize family to distract and reorient patient. Medicate when appropriate     Problem: Coping and Emotional Distress  Goal: Demonstrate acceptance of terminal illness and understanding of disease progression  Description  Patient/family/caregiver will demonstrate acceptance of terminal disease and understanding of disease progression while employing appropriate coping mechanisms. Outcome: Not Met  Note:   Assess for Emotional distress. Reorient and comfort patient as needed.

## 2019-06-02 NOTE — PROGRESS NOTES
Patient is being discharged to [facility] today. CM spoke with hospice RN Ghazala Paulson, [phone]) who confirmed bedspace at the facility today. Bedside RN to call report to [phone]. Transportation arranged through [facility], patient scheduled for  at 11:30 am today.

## 2019-06-02 NOTE — HOSPICE
[facility] to Those in Need  [phone]    Patient Name: Christiana Olson  YOB: 1924  Age: 80 y.o. [address] [facility] Liaison Note:   Follow up with patient, family, & primary RN. Chart reviewed. [facility] consents signed and reviewed with daughter Aydee Mario. Confirmed time of  by [facility] @ 1130am.  All belongings were accounted for with daughter. Pt is wearing her hearing aid in her right ear. Instructed daughter Day Or to inform [facility] staff of the hearing aid and verify that it with the patient upon arrival to [facility]. Updated Dr. Antonina Araujo on pt status and time frame of arrival.  Consents faxed and report called to [facility], Primary RN April called report to [facility] as well. All questions and concerns were addressed. Thank you for the opportunity to service Ms. Maia Azul and her family.        Francisco Parisi RN-BSN, CCRN

## 2019-06-03 NOTE — PROGRESS NOTES
Patient on discharge report dated 6/2/19 from [facility]. Admission dates: 5/30/19 - 6/2/19.  Pt noted transferred to the [facility]

## 2019-06-03 NOTE — PROGRESS NOTES
[facility] Pt is GIP level of care for management of pain, agitation and restlessness. Dx Senile Degeneration of the Brain. 1930 Pt is resting quietly in bed. Appears to be sleeping. 2015  Restless in bed, pulling off gown, moaning. Resistant to care. Pushing nurse away. Scratching at skin. Morphine and Benadryl given IV to manage symptoms. Son Ed called to check on pt.  2030 Pt appears to be sleeping. 2145 Daughter Ascencion Veras called to check on pt.  2200 Pt is calling out, restless in bed, pulling off clothes. Has scratch left forearm to point of bleeding. Morphine and Haldol given IV to manage symptoms. 2230 Remains restless. Morphine given IV to manage symptoms. 2300 Continues to call out for help. Pulling at clothes and covers. Morphine 2 mg given IV for symptoms. 2300 Pt continues to yell out at intervals but less restless in bed. 0000 Appears to be sleeping.  0100 Continue to monitor. Pt is resting quietly. 0200 Talking out at times. Speech is garbled. Pulling off gown. 0330 Restless in bed, pulling off clothes and cover. Yelling out loudly, \"help me. \"  New SQ site placed left thigh. IV occluded right hand. Morphine and Haldol given SQ for symptoms. 0400 Resting quietly. Appears to be sleeping  0500 Continue to monitor. Resting quietly at this time. 0600 Continues to sleep peacefully. 0630 Yelling \"help ME\" Throwing legs out of bed. Pulling at clothes and SQ site. Scratching at skin.   Morphine and Benadryl given for symptoms      NAME OF PATIENT:  Trav Hubbard    LEVEL OF CARE:  GIP    REASON FOR GIP:   Pain, despite numerous changes in medications, Terminal agitation, despite changes to medications, Medication adjustment that must be monitored 24/7 and Stabilizing treatment that cannot take place at home    *PATIENT REMAINS ELIGIBLE FOR GIP LEVEL OF CARE AS EVIDENCED BY: (MUST BE ADDRESSED OF PATIENT GIP)  Frequent assessment and medication administration via IV route required to manage symptoms. Dosage adjustments are required. REASON FOR RESPITE:  na    O2 SAFETY:  na    FALL INTERVENTIONS PROVIDED:   Implemented/recommended use of fall risk identification flag to all team members, Implemented/recommended resources for alarm system (personal alarm, bed alarm, call bell, etc.) , Implemented/recommended environmental changes (remove hazards, lower bed, improve lighting, etc.) and Implemented/recommended increased supervision/assistance    INTERDISPLINARY COMMUNICATION/COLLABORATION:  Physician, MSW, [place] and RN, CNA    NEW MEDICATION INITIATION DOCUMENTATION:  Documentation completed in Clinical Note in [address] [place] [address]    Reason medication is being initiated:  No new medications at this time    MD / Provider name consulted re: change in status / initiation of new medication:  na    New Symptom(s):  na    New Order(s):  na    Name of the person notified of the changes:  na    Name of person being taught:  na    Instructions given:  na    Side Effects taught:  na    Response to teaching:  na      COMFORTABLE DYING MEASURE:  Is Patient/family satisfied with symptom level? Yes    DISCHARGE PLAN:  Remain GIP until symptoms are managed and pt can be managed in the home setting.

## 2019-06-03 NOTE — PROGRESS NOTES
[facility]   Good Help to Those in Need  [phone]    Patient Name: Madiha Lawson  YOB: 1924    Date of Provider Hospice Visit: 06/03/19    Level of Care:   [x] General Inpatient (GIP)    [] Routine   [] Respite    Current Location of Care:  [] [facility] [] [facility] [] [facility] [] [facility] [x] Hospice House THE [facility], patient referred from:  [] [facility] [x] [facility] [] [facility] [] [facility] [] Home [] Other:     Date of Original Hospice Admission: 06/02/19  Hospice Medical Director at time of admission: [address] [place] [address] Diagnosis: Senile degeneration of brain  Diagnoses RELATED to the terminal prognosis: Dementia with behavioral issues,   Other Diagnoses: HTN, depression, Atrial fibrillation     HOSPICE SUMMARY        Madiha Lawson is a 80y.o. year old who was admitted to [facility]. The patient's principle diagnosis has resulted in further progression of dementia associated with behavioral manifestations and decline in function. Pt recently admitted with increased agitation requiring physical and chemical restraints that led to some stabilization. Pt also not eating and drinking now and has incurred weight loss. Functionally, the patient's Karnofsky and/or Palliative Performance Scale has declined over a period of days and is estimated at 20-30%. The patient is dependent on the following ADLs:all    Objective information that support this patients limited prognosis includes: CT Brain 5/30/19  FINDINGS:  Ventricles and cisterns are enlarged compatible with the overall degree of  volume loss    The patient/family chose comfort measures with the support of Hospice. HOSPICE DIAGNOSES   Active Symptoms:  1. Labored breathing  2. Diffuse pain  3. Fever  4. Restlessness/agitation/confusion/delirium  5. Dementia with behavioral disturbances  6. Pruritus  7 Increased oropharyngeal secretions     PLAN   1.  Continue GIP as pt symptomatic and needing close monitoring and active management, has required 13 doses of prn morphine in 24 hours, 5 doses of haldol with extreme agitated behavior and discomfort. 2. Robinul 0.2mg IV every 4 hours as needed for secretions  3. Toradol 30mg IV every 6 hours as needed for fever  4. Haldol 2mg IV every 4 hours as needed for agitation and now scheduled every 6 hours  5. Benadryl 25mg IV every 4 hours as needed for itching  6. Morphine 2mg IV every 15mts as needed for pain/dyspnea and now scheduled 4 mg IV every 4 hours    7.  and SW to support family needs  8. Disposition: pt will be switched to routine care once symptoms are stable    Prognosis estimated based on 06/03/19 clinical assessment is:   [] Hours   [x] Days   [] Other:    Communicated plan of care with: Hospice Case Manager; Hospice IDT; Care Team     GOALS OF CARE     Patient/Medical POA stated Goal of Care:     [x] I have reviewed and/or updated ACP information in the Advance Care Planning Navigator. This information is available in the [facility] [address] link in the patient's chart header. Primary Decision [facility] Agent):   Primary Decision Maker: EhsanFaith - Daughter - [phone]    Primary Decision Maker: Thea Flores - Daughter    Primary Decision Maker: Que Pisano., Kallie Westbrook - Son    Resuscitation Status: DNR  If DNR is there a Durable DNR on file? : [x] Yes [] No (If no, complete Durable DNR)    HISTORY     History obtained from: daughter Adonay Mcmahan, son in law Rip Crofts, pt opens eyes and signs but unable to respond    CHIEF COMPLAINT: N/A  The patient is:   [] Verbal  [x] Nonverbal  [] Unresponsive    HPI/SUBJECTIVE:  Pt appears to be having deep heavy breathing, intermittently signs due to pain.  Also some increased airway secretions       REVIEW OF SYSTEMS     The following systems were: [] reviewed  [x] unable to be reviewed       Adult Non-Verbal Pain Assessment Score: 5    Face  [] 0   No particular expression or smile  [x] 1   Occasional grimace, tearing, frowning, wrinkled forehead  [] 2   Frequent grimace, tearing, frowning, wrinkled forehead    Activity (movement)  [] 0   Lying quietly, normal position  [x] 1   Seeking attention through movement or slow, cautious movement  [] 2   Restless, excessive activity and/or withdrawal reflexes    Guarding  [x] 0   Lying quietly, no positioning of hands over areas of body  [] 1   Splinting areas of the body, tense  [] 2   Rigid, stiff    Physiology (vital signs)  [] 0   Stable vital signs  [x] 1   Change in any of the following: SBP > 20mm Hg; HR > 20/minute  [] 2   Change in any of the following: SBP > 30mm Hg; HR > 25/minute    Respiratory  [] 0   Baseline RR/SpO2, compliant with ventilator  [] 1   RR > 10 above baseline, or 5% drop SpO2, mild asynchrony with ventilator  [x] 2   RR > 20 above baseline, or 10% drop SpO2, asynchrony with ventilator     FUNCTIONAL ASSESSMENT     Palliative Performance Scale (PPS):30%       PSYCHOSOCIAL/SPIRITUAL ASSESSMENT     Active Problems:    * No active hospital problems.  *    Past Medical History:   Diagnosis Date    Arthritis     Arthritis     Dr. Disha Fowler [facility])    [facility])     colon CA in situ    Colon cancer ([facility] [address]     Dementia     Depression     Hematoma (nontraumatic) of breast     Dr. Kamar freeman breast surgeon    High cholesterol     Hypertension     Osteoporosis     Osteoporosis     Dr. 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    Umbilical hernia       Past Surgical History:   Procedure Laterality Date    HX COLECTOMY      HX HEENT      cateracts bilateral    HX HIP REPLACEMENT  2009    Right    HX ORTHOPAEDIC  2012    T11 & L1 vertebrae fx    HX PELVIC FRACTURE TX  2010      Social History     Tobacco Use    Smoking status: Former Smoker     Packs/day: 0.50     Last attempt to quit: 1970     Years since quittin.4    Smokeless tobacco: Never Used   Substance Use Topics    Alcohol use: No     Frequency: Never     Family History   Problem Relation Age of Onset    Heart Disease Mother       Allergies   Allergen Reactions    Latex Unknown (comments)     Pt unable to report      Codeine Unknown (comments)    Neosporin [Benzalkonium Chloride] Rash    Sulfa (Sulfonamide Antibiotics) Unknown (comments)    Sulfa Dyne Other (comments)     \"Crying jag\"      Current Facility-Administered Medications   Medication Dose Route Frequency    bisacodyl (DULCOLAX) suppository 10 mg  10 mg Rectal DAILY PRN    haloperidol lactate (HALDOL) injection 2 mg  2 mg IntraVENous Q4H PRN    ketorolac (TORADOL) injection 30 mg  30 mg IntraVENous Q6H PRN    morphine injection 2 mg  2 mg IntraVENous Q15MIN PRN    diphenhydrAMINE (BENADRYL) injection 25 mg  25 mg IntraVENous Q4H PRN    glycopyrrolate (ROBINUL) injection 0.2 mg  0.2 mg IntraVENous Q4H PRN        PHYSICAL EXAM     Wt Readings from Last 3 Encounters:   06/01/19 80.6 kg (177 lb 11.2 oz)   09/13/18 74.8 kg (165 lb)   09/07/18 74.8 kg (165 lb)       Visit Vitals  /62   Pulse (!) 107   Temp 99.1 °F (37.3 °C)   Resp 20   SpO2 93%       Supplemental O2  [] Yes  [] NO  Last bowel movement:     Currently this patient has:  [x] Peripheral IV [] PICC  [] PORT [] ICD    [] Sánchez Catheter [] NG Tube   [] PEG Tube    [] Rectal Tube [] Drain  [] Other:     Constitutional:lethargic, in mild distress  Eyes: pallor  ENMT: some increased airway secretions  Cardiovascular: slightly tachycardic  Respiratory: labored breathing  Gastrointestinal: distension, non tender, bowel sounds Ok  Musculoskeletal:diffuse pain  Skin:diffuse eczema, areas of skin breakdown left leg/shin covered with wrap, warm to touch  Neurologic:lethargic, no other obvious deficits  Psychiatric: some anxiety  Other:       Pertinent Lab and or Imaging Tests:  Lab Results   Component Value Date/Time    Sodium 144 06/01/2019 01:08 AM    Potassium 3.5 06/01/2019 01:08 AM    Chloride 113 (H) 06/01/2019 01:08 AM    CO2 28 06/01/2019 01:08 AM    Anion gap 3 (L) 06/01/2019 01:08 AM    Glucose 130 (H) 06/01/2019 01:08 AM    BUN 19 06/01/2019 01:08 AM    Creatinine 0.80 06/01/2019 01:08 AM    BUN/Creatinine ratio 24 (H) 06/01/2019 01:08 AM    GFR est AA >60 06/01/2019 01:08 AM    GFR est non-AA >60 06/01/2019 01:08 AM    Calcium 8.3 (L) 06/01/2019 01:08 AM     Lab Results   Component Value Date/Time    Protein, total 6.8 05/30/2019 03:24 PM    Albumin 3.2 (L) 05/30/2019 03:24 PM           Chong Graff NP

## 2019-06-04 NOTE — PROGRESS NOTES
[employer]   Good Help to Those in Need  [phone]    Patient Name: Aris Dial  YOB: 1924    Date of Provider Hospice Visit: 06/04/19    Level of Care:   [x] General Inpatient (GIP)    [] Routine   [] Respite    Current Location of Care:  [] [facility] [] [facility] [] [facility] [] [facility] [x] [facility], patient referred from:  [] [facility] [x] [facility] [] [facility] [] [facility] [] Home [] Other:     Date of Original Hospice Admission: 06/02/19  Hospice Medical Director at time of admission: [address] [place] [address] Diagnosis: Senile degeneration of brain  Diagnoses RELATED to the terminal prognosis: Dementia with behavioral issues,   Other Diagnoses: HTN, depression, Atrial fibrillation     HOSPICE SUMMARY        Aris Dial is a 80y.o. year old who was admitted to [facility]. The patient's principle diagnosis has resulted in further progression of dementia associated with behavioral manifestations and decline in function. Pt recently admitted with increased agitation requiring physical and chemical restraints that led to some stabilization. Pt also not eating and drinking now and has incurred weight loss. Functionally, the patient's Karnofsky and/or Palliative Performance Scale has declined over a period of days and is estimated at 20-30%. The patient is dependent on the following ADLs:all    Objective information that support this patients limited prognosis includes: CT Brain 5/30/19  FINDINGS:  Ventricles and cisterns are enlarged compatible with the overall degree of  volume loss    The patient/family chose comfort measures with the support of Hospice. HOSPICE DIAGNOSES   Active Symptoms:  1. Labored breathing  2. Diffuse pain  3. Fever  4. Restlessness/agitation/confusion/delirium  5. Dementia with behavioral disturbances  6. Pruritus  7 Increased oropharyngeal secretions     PLAN   1.  Continue GIP as pt symptomatic and needing close monitoring and active management  2. Continue current medications to control agitation and restlessness: scheduled geodon, haldol and prn medications. 3. Continue other comfort meds. 4.  and SW to support family needs  5. Disposition: pt will be switched to routine care once symptoms are stable    Prognosis estimated based on 06/04/19 clinical assessment is:   [] Hours   [x] Days   [] Other:    Communicated plan of care with: Hospice Case Manager; Hospice IDT; Care Team     GOALS OF CARE     Patient/Medical POA stated Goal of Care:     [x] I have reviewed and/or updated ACP information in the Advance Care Planning Navigator. This information is available in the [address] link in the patient's chart header. Primary Decision [facility] Agent):   Primary Decision Maker: Faith Moser - Daughter - [phone]    Primary Decision Maker: Carlos Alberto Ryan - Daughter    Primary Decision Maker: Reginald Kong., Dioni Calvo Son    Resuscitation Status: DNR  If DNR is there a Durable DNR on file? : [x] Yes [] No (If no, complete Durable DNR)    HISTORY     History obtained from: hospice staff    CHIEF COMPLAINT: N/A  The patient is:   [] Verbal  [x] Nonverbal  [x] Unresponsive    HPI/SUBJECTIVE:  Pt is lethargic, breathing heavily from mouth, non responsive. Overnight has received. 4 prn haldol in addition to scheduled doses  6 prn morphine and all scheduled doses  Scheduled geodon twice daily       REVIEW OF SYSTEMS     The following systems were: [] reviewed  [x] unable to be reviewed.       Adult Non-Verbal Pain Assessment Score: 5    Face  [] 0   No particular expression or smile  [x] 1   Occasional grimace, tearing, frowning, wrinkled forehead  [] 2   Frequent grimace, tearing, frowning, wrinkled forehead    Activity (movement)  [] 0   Lying quietly, normal position  [x] 1   Seeking attention through movement or slow, cautious movement  [] 2   Restless, excessive activity and/or withdrawal reflexes    Guarding  [x] 0   Lying quietly, no positioning of hands over areas of body  [] 1   Splinting areas of the body, tense  [] 2   Rigid, stiff    Physiology (vital signs)  [] 0   Stable vital signs  [x] 1   Change in any of the following: SBP > 20mm Hg; HR > 20/minute  [] 2   Change in any of the following: SBP > 30mm Hg; HR > 25/minute    Respiratory  [] 0   Baseline RR/SpO2, compliant with ventilator  [] 1   RR > 10 above baseline, or 5% drop SpO2, mild asynchrony with ventilator  [x] 2   RR > 20 above baseline, or 10% drop SpO2, asynchrony with ventilator     FUNCTIONAL ASSESSMENT     Palliative Performance Scale (PPS):20%       PSYCHOSOCIAL/SPIRITUAL ASSESSMENT     Active Problems:    * No active hospital problems.  *    Past Medical History:   Diagnosis Date    Arthritis     Arthritis     Dr. Chelsea Rodrigez [facility])    [address] [facility])     colon CA in situ    Colon cancer ([facility] [address]     Dementia     Depression     Hematoma (nontraumatic) of breast     Dr. Jimmy freeman breast surgeon    High cholesterol     Hypertension     Osteoporosis     Osteoporosis     Dr. 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    Umbilical hernia       Past Surgical History:   Procedure Laterality Date    HX COLECTOMY      HX HEENT      cateracts bilateral    HX HIP REPLACEMENT  2009    Right    HX ORTHOPAEDIC  2012    T11 & L1 vertebrae fx    HX PELVIC FRACTURE [place]  2010      Social History     Tobacco Use    Smoking status: Former Smoker     Packs/day: 0.50     Last attempt to quit: 1970     Years since quittin.4    Smokeless tobacco: Never Used   Substance Use Topics    Alcohol use: No     Frequency: Never     Family History   Problem Relation Age of Onset    Heart Disease Mother       Allergies   Allergen Reactions    Latex Unknown (comments)     Pt unable to report      Codeine Unknown (comments)    Neosporin [Benzalkonium Chloride] Rash    Sulfa (Sulfonamide Antibiotics) Unknown (comments)    Sulfa Dyne Other (comments)     \"Crying jag\"      Current Facility-Administered Medications   Medication Dose Route Frequency    morphine injection 4 mg  4 mg IntraVENous Q4H    haloperidol (HALDOL) 2 mg/mL oral solution 2 mg  2 mg Oral Q6H    ziprasidone (GEODON) 20 mg in sterile water (preservative free) 1 mL injection  20 mg IntraMUSCular Q12H    bisacodyl (DULCOLAX) suppository 10 mg  10 mg Rectal DAILY PRN    haloperidol lactate (HALDOL) injection 2 mg  2 mg IntraVENous Q4H PRN    ketorolac (TORADOL) injection 30 mg  30 mg IntraVENous Q6H PRN    morphine injection 2 mg  2 mg IntraVENous Q15MIN PRN    diphenhydrAMINE (BENADRYL) injection 25 mg  25 mg IntraVENous Q4H PRN    glycopyrrolate (ROBINUL) injection 0.2 mg  0.2 mg IntraVENous Q4H PRN        PHYSICAL EXAM     Wt Readings from Last 3 Encounters:   06/01/19 80.6 kg (177 lb 11.2 oz)   09/13/18 74.8 kg (165 lb)   09/07/18 74.8 kg (165 lb)       Visit Vitals  /68 (BP 1 Location: Left arm)   Pulse (!) 103   Temp 99.2 °F (37.3 °C)   Resp 8   SpO2 93%       Supplemental O2  [] Yes  [] NO  Last bowel movement:     Currently this patient has:  [x] Peripheral IV [] PICC  [] PORT [] ICD    [] Sánchez Catheter [] NG Tube   [] PEG Tube    [] Rectal Tube [] Drain  [] Other:     Constitutional:lethargic, in mild distress  Eyes: pallor  ENMT: some increased airway secretions  Cardiovascular: slightly tachycardic  Respiratory: labored breathing  Gastrointestinal: distension, non tender, bowel sounds Ok  Musculoskeletal:diffuse pain  Skin:diffuse eczema, areas of skin breakdown left leg/shin covered with wrap, warm to touch  Neurologic:lethargic, no other obvious deficits  Psychiatric: restlessness intermittently  Other:       Pertinent Lab and or Imaging Tests:  Lab Results   Component Value Date/Time    Sodium 144 06/01/2019 01:08 AM    Potassium 3.5 06/01/2019 01:08 AM Chloride 113 (H) 06/01/2019 01:08 AM    CO2 28 06/01/2019 01:08 AM    Anion gap 3 (L) 06/01/2019 01:08 AM    Glucose 130 (H) 06/01/2019 01:08 AM    BUN 19 06/01/2019 01:08 AM    Creatinine 0.80 06/01/2019 01:08 AM    BUN/Creatinine ratio 24 (H) 06/01/2019 01:08 AM    GFR est AA >60 06/01/2019 01:08 AM    GFR est non-AA >60 06/01/2019 01:08 AM    Calcium 8.3 (L) 06/01/2019 01:08 AM     Lab Results   Component Value Date/Time    Protein, total 6.8 05/30/2019 03:24 PM    Albumin 3.2 (L) 05/30/2019 03:24 PM           Danial Huerta MD   Time spent: 35mts

## 2019-06-04 NOTE — PROGRESS NOTES
NAME OF PATIENT:  Sangeeta Garcia    LEVEL OF CARE:  GIP    REASON FOR GIP:   Pain, despite numerous changes in medications, Terminal agitation, despite changes to medications and Stabilizing treatment that cannot take place at home    *PATIENT REMAINS ELIGIBLE FOR GIP LEVEL OF CARE AS EVIDENCED BY: frequent need for IV/SQ medications- addition on Geodon IM to manage agitation and pain    O2 SAFETY:  On R/A    FALL INTERVENTIONS PROVIDED:   Implemented/recommended resources for alarm system (personal alarm, bed alarm, call bell, etc.)  and Implemented/recommended increased supervision/assistance    INTERDISPLINARY COMMUNICATION/COLLABORATION:  Physician, MSW, [place] and RN, CNA    NEW MEDICATION INITIATION DOCUMENTATION:  N/A at present    Reason medication is being initiated:  N/A    MD / Provider name consulted re: change in status / initiation of new medication:  N/A    New Symptom(s):  N/A    New Order(s):  N/A    Name of the person notified of the changes:  DTR to be notified if need     Name of person being taught:  N/A    Instructions given:  N/A    Side Effects taught:  N/A    Response to teaching:  N/A      COMFORTABLE DYING MEASURE:  Is Patient/family satisfied with symptom level?  yes    DISCHARGE PLAN:  Pending  1900  Report from previous shift  2010  Resting quietly on R side. No signs of distress but jerks and opens eyes as if afraid to touch and upon assessment. HRR at 84. Respirations even unlabored at 16. PA completed. Will continue to monitor completed. 2054  Scheduled medications given. Slight grimace on face s/p injection. Will continue to monitor  2219  Resting quietly with even unlabored respirations. Resistant to my taking her BP but able to obtain. Slight forehead grimace noted. Will continue to monitor  2240  Scheduled medication given. Client moans and yells out with turning and positioning. Depends remain dry.   Mouth care provided  0004  Resting with eyes closed and unlabored respirations. Slight grimace on forehead. Will continue to monitor  0057  Resting quietly without signs of distress. Will continue to monitor  0156  Continues to groan and jerk with touch and attempt to move. No significant urine output for greater than 18 hours. Sánchez cath inserted with approx 600cc return. With CNA assistance. S/p prn doses of morphine and haldol as per MARs. Will continue to monitor  0247  Grimacing intermittently. Jerks and resist positioning and turning. Scheduled medication given as per [facility]  0336  Resting quietly without signs of distress. Will continue to monitor  0503  Eyes open. No signs of distress. Let me complete mouth care and client seemed to enjoy it  0600  Restless now moving arms about. Moaning. PRN Haldol and Morphine. Facial grimacing. Positioned more supine but with obvious distress. Will continue to monitor  8536  Still with some moaning and facial grimacing but extremities now relaxed. Scheduled morphine as per MARs.   Will continue to monitor

## 2019-06-04 NOTE — PROGRESS NOTES
NAME OF PATIENT:  Christiana Olson    LEVEL OF CARE:  GIP    REASON FOR GIP:   Terminal agitation, despite changes to medications and Stabilizing treatment that cannot take place at home    *PATIENT REMAINS ELIGIBLE FOR GIP LEVEL OF CARE AS EVIDENCED BY: Need for scheduled and PRN subq medications     REASON FOR RESPITE:  N/A    O2 SAFETY:  N/A    FALL INTERVENTIONS PROVIDED:   Implemented/recommended use of non-skid footwear, Implemented/recommended use of fall risk identification flag to all team members, Implemented/recommended environmental changes (remove hazards, lower bed, improve lighting, etc.) and Implemented/recommended increased supervision/assistance    INTERDISPLINARY COMMUNICATION/COLLABORATION:  Physician, MSW, [place] and RN, CNA    NEW MEDICATION INITIATION DOCUMENTATION:  N/A    Reason medication is being initiated:  N/A    MD / Provider name consulted re: change in status / initiation of new medication:  N/A    New Symptom(s):  N/A    New Order(s):  N/A    Name of the person notified of the changes:  N/A    Name of person being taught:  N/A    Instructions given:  N/A    Side Effects taught:  N/A    Response to teaching:  N/A      COMFORTABLE DYING MEASURE:  Is Patient/family satisfied with symptom level? Yes    DISCHARGE PLAN:  pending      0700 Report received from [facility]  1565 Patient noted to have facial grimacing, gave PRN morphine 2 MG. Will continue to monitor. 8885 Patient noted to have a more relaxed facial expression. Will continue to monitor. 8160 Gave scheduled Geodon IM and scheduled Haldol. Patient resting quietly in bed, will continue to monitor. 5161 Patient's family at the bedside, updated on night and morning. Patient resting in bed quietly. 1003 Patient given scheduled morphine and PRN haldol, patient calling out moving extremities restlessly. Will continue to monitor. 1040 Patient repositioned and turned in bed, patient became agitated with turn.  Gave PRN morphine, will continue to monitor. 1125 Patient resting in bed quietly, respirations are even and unlabored. 1222 Patient given PRN morphine for noted facial grimacing. Will continue to monitor. 1250 Patient resting in bed with a neutral facial expression. Respirations are even and unlabored. 1340 Patient resting in bed, respirations are even and unlabored. Daughter is at the bedside, educated on end of life care. 26 Patient's daughter and granddaughter at the bedside, patient resting in bed quietly, respirations are even and unlabored. Mariatal 2 patient scheduled IV morphine and held scheduled haldol, patient unable to tolerate PO at this time. Will continue to monitor. [address] and repositioned patient, patient became agitated, gave PRN IV haldol and PRN IV morphine will continue to monitor. 1542 Patient given bed bath, patient began grimacing, gave PRN IV morphine. Will continue to monitor. 1558 Patient continues to grimace. Gave PRN morphine. Will continue to monitor. 1615 Patient using increased respiratory muscles to breathe, gave PRN morphine. Will continue to monitor. 1630 Patient resting quietly with eyes closed, respirations are even and unlabored. 5 Patient's family at the bedside, updated on patient's day. Patient resting in bed quietly, respirations are even and unlabored. 1751 Patient grimacing in bed and respirations are increased. Gave PRN morphine, will continue to monitor. 1805 Patient given scheduled morphine, patient turned and repositioned to left side. Patient tolerated turn well.

## 2019-06-04 NOTE — PROGRESS NOTES
Medications to aid comfort. Geodon IM added for severe agitation. Not using restraints.   Turning and positioning for comfort

## 2019-06-05 NOTE — PROGRESS NOTES
NAME OF PATIENT:  Ester Posada    LEVEL OF CARE:  [facility]    REASON FOR GIP:   Pain, despite numerous changes in medications, Terminal agitation, despite changes to medications, Medication adjustment that must be monitored 24/7 and Stabilizing treatment that cannot take place at home    *PATIENT REMAINS ELIGIBLE FOR GIP LEVEL OF CARE AS EVIDENCED BY: Patient receiving scheduled medications for symptom management. REASON FOR RESPITE:  n/a    O2 SAFETY:  n/a    FALL INTERVENTIONS PROVIDED:   Implemented/recommended use of non-skid footwear, Implemented/recommended resources for alarm system (personal alarm, bed alarm, call bell, etc.)  and Implemented/recommended environmental changes (remove hazards, lower bed, improve lighting, etc.)    INTERDISPLINARY COMMUNICATION/COLLABORATION:  Physician, MSW, [place] and RN, CNA    NEW MEDICATION INITIATION DOCUMENTATION:      Reason medication is being initiated:     MD / Provider name consulted re: change in status / initiation of new medication:      New Symptom(s):      New Order(s):      Name of the person notified of the changes:      Name of person being taught:      Instructions given:      Side Effects taught:      Response to teaching:        COMFORTABLE DYING MEASURE:  Is Patient/family satisfied with symptom level?  yes    DISCHARGE PLAN:  Pending. Working with family on plan.       0700 Report received from [facility]. LOC [facility]. DX senile degeneration of brain. 0800 Assessment completed - see flowsheets. 392 Scheduled Geodon administered per [facility]   0920 In room to speak with son and provide update on patient condition. 1010 Patient resting quietly in room. Family at bedside. 1030 Patient provided with bed bath. 1130 Patient having non-labored breathing. No signs of distress. 1230 No grimacing. No groaning. Non-labored breathing. Family at bedside. 1325 Patient resting quietly in bed. No indication of pain or distress. 1415 Family at bedside. Educated on signs of end of life. 1500 Scheduled medications administered per MAR.   1600 Patient having non-labored breathing. No grimacing or groaning. 1700 Patient resting quietly in bed. No indication of distress. 1800 Scheduled medications administered per MAR.

## 2019-06-05 NOTE — PROGRESS NOTES
Problem: Falls - Risk of  Goal: *Absence of Falls  Description  Document John Quinonez Fall Risk and appropriate interventions in the flowsheet. Note:   Fall Risk Interventions:       Mentation Interventions: Adequate sleep, hydration, pain control, Door open when patient unattended, Evaluate medications/consider consulting pharmacy, Eyeglasses and hearing aids, Room close to nurse's station, Toileting rounds, Update white board    Medication Interventions: Evaluate medications/consider consulting pharmacy    Elimination Interventions: Call light in reach. No falls during this admission at [facility]. Problem: Pressure Injury - Risk of  Goal: *Prevention of pressure injury  Description  Document Umesh Scale and appropriate interventions in the flowsheet. Note:   Pressure Injury Interventions:  Sensory Interventions: Assess changes in LOC, Avoid rigorous massage over bony prominences, Check visual cues for pain, Float heels, Keep linens dry and wrinkle-free, Minimize linen layers, Monitor skin under medical devices, Pad between skin to skin, Pressure redistribution bed/mattress (bed type), Turn and reposition approx. every two hours (pillows and wedges if needed)    Moisture Interventions: Absorbent underpads, Apply protective barrier, creams and emollients, Check for incontinence Q2 hours and as needed, Contain wound drainage, Internal/External urinary devices, Limit adult briefs, Maintain skin hydration (lotion/cream), Minimize layers, Moisture barrier    Activity Interventions: Pressure redistribution bed/mattress(bed type)    Mobility Interventions: Float heels, HOB 30 degrees or less, Pressure redistribution bed/mattress (bed type), Turn and reposition approx. every two hours(pillow and wedges)    Nutrition Interventions: Document food/fluid/supplement intake    Friction and Shear Interventions: Apply protective barrier, creams and emollients, HOB 30 degrees or less, Lift sheet, Minimize layers.  No pressure injuries/ skin breakdown during this [facility] admission. Turning and repositioning to prevent skin breakdown. Problem: Comfort Deficit  Goal: Reduce/control pain  Description  Patient will report that pain has been reduced or controlled through verbal and nonverbal means and that measures to promote comfort are effective. Note:   Scheduled and prn medications for symptom control. No Non-verbal indicators of pain at this time. Problem: Comfort Deficit  Goal: Reduce/control pain  Description  Patient will report that pain has been reduced or controlled through verbal and nonverbal means and that measures to promote comfort are effective. Note:   Scheduled and prn medications for symptom control. No Non-verbal indicators of pain at this time. Problem: Anxiety/Agitation  Goal: Verbalize and demonstrate ability to manage anxiety  Description  The patient/family/caregiver will verbalize and demonstrate ability to manage the patient's anxiety throughout hospice care. Note:   Managed with scheduled and prn medications.

## 2019-06-05 NOTE — PROGRESS NOTES
[facility]   Good Help to Those in Need  [phone]    Patient Name: Elba Sandoval  YOB: 1924    Date of Provider Hospice Visit: 06/05/19    Level of Care:   [x] General Inpatient (GIP)    [] Routine   [] Respite    Current Location of Care:  [] [facility] [] [facility] [] [facility] [] [facility] [x] [facility], patient referred from:  [] [facility] [x] [facility] [] [facility] [] [facility] [] Home [] Other:     Date of Original Hospice Admission: 06/02/19  Hospice Medical Director at time of admission: [address] [place] [address] Diagnosis: Senile degeneration of brain  Diagnoses RELATED to the terminal prognosis: Dementia with behavioral issues,   Other Diagnoses: HTN, depression, Atrial fibrillation     HOSPICE SUMMARY        Elba Sandoval is a 80y.o. year old who was admitted to [facility]. The patient's principle diagnosis has resulted in further progression of dementia associated with behavioral manifestations and decline in function. Pt recently admitted with increased agitation requiring physical and chemical restraints that led to some stabilization. Pt also not eating and drinking now and has incurred weight loss. Functionally, the patient's Karnofsky and/or Palliative Performance Scale has declined over a period of days and is estimated at 20-30%. The patient is dependent on the following ADLs:all    Objective information that support this patients limited prognosis includes: CT Brain 5/30/19  FINDINGS:  Ventricles and cisterns are enlarged compatible with the overall degree of  volume loss    The patient/family chose comfort measures with the support of Hospice. HOSPICE DIAGNOSES   Active Symptoms:  1. Labored breathing  2. Diffuse pain  3. Fever  4. Restlessness/agitation/confusion/delirium, improved  5. Dementia with behavioral disturbances  6. Pruritus  7 Increased oropharyngeal secretions     PLAN   1.  Continue GIP as pt symptomatic and needing close monitoring and active management  2. Continue current medications to control agitation and restlessness: continue scheduled Geodon    3. Discontinue haldol    4. Increased  Morphine IV to 4 mg has had 10 PRN doses in 24 hours , continue scheduled morphine 4 mg IV increased to every 3 hopurs  5. Will schedule robinul every 6 hours   6. Continue other comfort meds. 7.  and SW to support family needs  8. Disposition: pt will be switched to routine care once symptoms are stable    Prognosis estimated based on 06/05/19 clinical assessment is:   [x] Hours   [] Days   [] Other:    Communicated plan of care with: Hospice Case Manager; Hospice IDT; Care Team     GOALS OF CARE     Patient/Medical POA stated Goal of Care:     [x] I have reviewed and/or updated ACP information in the Advance Care Planning Navigator. This information is available in the [facility] link in the patient's chart header. Primary Decision [facility] Agent):   Primary Decision Maker: Faith Moser - Daughter - [phone]    Primary Decision Maker: Marie Rodriguez - Daughter    Primary Decision Maker: Laurel Ji., Carlota Farias Son    Resuscitation Status: DNR  If DNR is there a Durable DNR on file? : [x] Yes [] No (If no, complete Durable DNR)    HISTORY     History obtained from: hospice staff    CHIEF COMPLAINT: N/A  The patient is:   [] Verbal  [x] Nonverbal  [x] Unresponsive    HPI/SUBJECTIVE:  Pt is lethargic, breathing heavily from mouth, non responsive. Overnight has received. 4 prn haldol in addition to scheduled doses  6 prn morphine and all scheduled doses  Scheduled geodon twice daily       REVIEW OF SYSTEMS     The following systems were: [] reviewed  [x] unable to be reviewed.       Adult Non-Verbal Pain Assessment Score: 5    Face  [] 0   No particular expression or smile  [x] 1   Occasional grimace, tearing, frowning, wrinkled forehead  [] 2   Frequent grimace, tearing, frowning, wrinkled forehead    Activity (movement)  [] 0   Lying quietly, normal position  [x] 1   Seeking attention through movement or slow, cautious movement  [] 2   Restless, excessive activity and/or withdrawal reflexes    Guarding  [x] 0   Lying quietly, no positioning of hands over areas of body  [] 1   Splinting areas of the body, tense  [] 2   Rigid, stiff    Physiology (vital signs)  [] 0   Stable vital signs  [x] 1   Change in any of the following: SBP > 20mm Hg; HR > 20/minute  [] 2   Change in any of the following: SBP > 30mm Hg; HR > 25/minute    Respiratory  [] 0   Baseline RR/SpO2, compliant with ventilator  [] 1   RR > 10 above baseline, or 5% drop SpO2, mild asynchrony with ventilator  [x] 2   RR > 20 above baseline, or 10% drop SpO2, asynchrony with ventilator     FUNCTIONAL ASSESSMENT     Palliative Performance Scale (PPS): 10%       PSYCHOSOCIAL/SPIRITUAL ASSESSMENT     Active Problems:    * No active hospital problems.  *    Past Medical History:   Diagnosis Date    Arthritis     Arthritis     Dr. Lexus Garcia [facility])    [facility])     colon CA in situ    Colon cancer ([facility] [address]     Dementia     Depression     Hematoma (nontraumatic) of breast     Dr. Silvia freeman breast surgeon    High cholesterol     Hypertension     Osteoporosis     Osteoporosis     Dr. 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    Umbilical hernia       Past Surgical History:   Procedure Laterality Date    HX COLECTOMY      HX HEENT      cateracts bilateral    HX HIP REPLACEMENT  2009    Right    HX ORTHOPAEDIC  2012    T11 & L1 vertebrae fx    HX PELVIC FRACTURE [place]  2010      Social History     Tobacco Use    Smoking status: Former Smoker     Packs/day: 0.50     Last attempt to quit: 1970     Years since quittin.4    Smokeless tobacco: Never Used   Substance Use Topics    Alcohol use: No     Frequency: Never     Family History   Problem Relation Age of Onset    Heart Disease Mother       Allergies   Allergen Reactions    Latex Unknown (comments)     Pt unable to report      Codeine Unknown (comments)    Neosporin [Benzalkonium Chloride] Rash    Sulfa (Sulfonamide Antibiotics) Unknown (comments)    Sulfa Dyne Other (comments)     \"Crying jag\"      Current Facility-Administered Medications   Medication Dose Route Frequency    haloperidol lactate (HALDOL) injection 2 mg  2 mg IntraVENous Q6H    morphine injection 4 mg  4 mg IntraVENous Q4H    ziprasidone (GEODON) 20 mg in sterile water (preservative free) 1 mL injection  20 mg IntraMUSCular Q12H    bisacodyl (DULCOLAX) suppository 10 mg  10 mg Rectal DAILY PRN    haloperidol lactate (HALDOL) injection 2 mg  2 mg IntraVENous Q4H PRN    ketorolac (TORADOL) injection 30 mg  30 mg IntraVENous Q6H PRN    morphine injection 2 mg  2 mg IntraVENous Q15MIN PRN    diphenhydrAMINE (BENADRYL) injection 25 mg  25 mg IntraVENous Q4H PRN    glycopyrrolate (ROBINUL) injection 0.2 mg  0.2 mg IntraVENous Q4H PRN        PHYSICAL EXAM     Wt Readings from Last 3 Encounters:   06/01/19 80.6 kg (177 lb 11.2 oz)   09/13/18 74.8 kg (165 lb)   09/07/18 74.8 kg (165 lb)       Visit Vitals  /52   Pulse 60   Temp 99.2 °F (37.3 °C)   Resp (!) 7   SpO2 (!) 60%       Supplemental O2  [] Yes  [] NO  Last bowel movement:     Currently this patient has:  [x] Peripheral IV [] PICC  [] PORT [] ICD    [] Sánchez Catheter [] NG Tube   [] PEG Tube    [] Rectal Tube [] Drain  [] Other:     Constitutional:unresponsive  Eyes: pallor  ENMT: some increased airway secretions  Cardiovascular: slightly tachycardic  Respiratory: labored breathing  Gastrointestinal: distension, non tender, bowel sounds Ok  Musculoskeletal:diffuse pain  Skin:diffuse eczema, areas of skin breakdown left leg/shin covered with wrap, warm to touch  Neurologic:lethargic, no other obvious deficits  Psychiatric: restlessness intermittently  Other: Pertinent Lab and or Imaging Tests:  Lab Results   Component Value Date/Time    Sodium 144 06/01/2019 01:08 AM    Potassium 3.5 06/01/2019 01:08 AM    Chloride 113 (H) 06/01/2019 01:08 AM    CO2 28 06/01/2019 01:08 AM    Anion gap 3 (L) 06/01/2019 01:08 AM    Glucose 130 (H) 06/01/2019 01:08 AM    BUN 19 06/01/2019 01:08 AM    Creatinine 0.80 06/01/2019 01:08 AM    BUN/Creatinine ratio 24 (H) 06/01/2019 01:08 AM    GFR est AA >60 06/01/2019 01:08 AM    GFR est non-AA >60 06/01/2019 01:08 AM    Calcium 8.3 (L) 06/01/2019 01:08 AM     Lab Results   Component Value Date/Time    Protein, total 6.8 05/30/2019 03:24 PM    Albumin 3.2 (L) 05/30/2019 03:24 PM           Philly Brasher NP

## 2019-06-05 NOTE — PROGRESS NOTES
NAME OF PATIENT: Laurie Georges  ? Verbal shift change report given to Elton Crigler (oncoming nurse) by Topher Shahid (offgoing nurse). Report included the following information SBAR and Kardex. LEVEL OF CARE: [facility] beginning on admission 6/2/2019.   ?   REASON FOR GIP:   Criteria for admission to [facility] - uncontrolled symptoms and pain. The \"precipitating event\":  Patient was transferred from [facility] for inpatient hospice care. Attempts to resolve the problem and the results of the interventions: PO medications were not adequate to manage the patient's symptoms. IV/ subq medications were needed to manage the patient's symptoms, and she could not be cared for at home. IDG decision to transfer to an appropriate setting: Transfer to [facility] for symptoms management. Pain, despite numerous changes in medications, Unmanageable respiratory distress, Terminal agitation, despite changes to medications, Medication adjustment that must be monitored 24/7 and Stabilizing treatment that cannot take place at home   The patient requires close monitoring, and frequent nursing assessment and reassessment of symptoms and response to the medication and treatments given. Stabilizing treatment, care, and symptom management cannot be managed in the home setting because   ? *PATIENT REMAINS ELIGIBLE FOR [facility] LEVEL OF CARE AS EVIDENCED BY: (MUST BE ADDRESSED OF PATIENT GIP) his ongoing symptoms and treatments for those symptoms. ?   ?   O2 SAFETY:   Patient is on room air. Breathing is deep with periods of apnea. Lungs are clear. 2105 - 75%. 2144 - 80%. 0047 - 74%.   ?   FALL INTERVENTIONS PROVIDED:   Implemented/recommended use of fall risk identification flag to all team members, Implemented/recommended assistive devices and encouraged their use, Implemented/recommended resources for alarm system (personal alarm, bed alarm, call bell, etc.) , Implemented/recommended environmental changes (remove hazards, lower bed, improve lighting, etc.) and Implemented/recommended increased supervision/assistance   Fall precautions maintained. ?   INTERDISCIPLINARY COMMUNICATION/COLLABORATION:   Physician, MSW, [place] and RN, CNA   ? NEW MEDICATION INITIATION DOCUMENTATION:   No new medication at this time. Reason medication is being initiated:    MD / Provider name consulted re: change in status / initiation of new medication:   ?   New Symptom(s) actively being addressed and interventions to stabilize: No new symptoms at this time. Ongoing symptoms and interventions to stabilize include:   1. Dyspnea. Evidenced by occasional labored breathing and low oxygen sats/ pulse-ox. Managed with scheduled Morphine 4 mg subq (given x 2 tonight). 2. Restlessness/ agitation. Evidenced by moaning, moving extremities, resistance/ combativeness when being turned. Well managed at this time. Managed with scheduled Haldol 2 mg subq every 6 hours (given x 3 tonight), and scheduled Geodon 20 mg IM given x 1 tonight. 3. Confusion/ delirium. Patient is unresponsive at this time. 4. Pain. Evidenced by moaning/ grimacing. Well managed at this time. Managed with scheduled Morphine 4 mg subq (given x 2 tonight), and PRN Toradol. 5. Itching. Managed with PRN Benadryl 25 mg every 4 hours. 6. Upper airway secretions. Managed with PRN Robinul 0.2 mg. Patient's response to interventions: Symptoms are managed well at this time. ?   New Order(s): No new orders at this time. Name of the person notified of the changes:    Name of person being taught:   Instructions given:   Side Effects taught:   Response to teaching:?   ?   COMFORTABLE DYING MEASURE:   Is Patient/family satisfied with symptom level? Yes, signed DNR is on the patient's chart. ?   DISCHARGE PLAN:   The patient could possibly return home to be cared for by family when symptoms have stabilized and can be managed by the caregiver in the home.  Possible end of life care here at [facility] because patient's condition continues to decline. Symptomatic imminent death that cannot be managed at home. R [address]ela [facility] home hospice care if and when discharged from [facility]. Night shift summary:   1900 - Shift report received. 2000 - Sleeping/ unresponsive. The patient's hearing aid is beside the sink and his still has his upper partial plate still in. NPO.  2128 - Scheduled Haldol given. Scheduled Geodon given. 2140 - Lozoya is patent and draining clear jacinda urine. Alternating deep breathing with shallow breathing; periods of apnea. Bowel sounds are hypoactive. Turned and repositioned to her right side. 2 abrasions on her right knee. Left shin, Mepilex dressings x 2, dry and intact. 2200 - No redness, swelling, or leaking at right thigh subq site for administering medications. 2308 - Scheduled Morphine given. 0 - Phone call from patient's son Viviane Engle, asked how his mother was doing, is in town from [facility]. Patient continues heavy breathing with periods of apnea. 0045 -  Scheduled Haldol given. Patient remains unresponsive. 0100 - Sleeping. No moaning or grimacing.   0200 - Remains unresponsive.   0300 - Asleep. 9537 - Scheduled Morphine given. Turned and repositioned to left side. 0400 - Asleep.  0500 - Asleep. 1258 - Total urine output tonight from the lozoya catheter = 300 ml. Scheduled Haldol given. Patient remains unresponsive, occasional soft moaning. 3336 - Scheduled Morphine given. No changes in patient's condition. 0700 - Shift report given.

## 2019-06-06 NOTE — HSPC IDG CHAPLAIN NOTES
Patient: Abdi Carlos    Date: 06/06/19  Time: 9:28 AM    [facility]  Notes  New patient who will be seen by 6/6/2019 for support and to assess spiritual care needs.       Signed by: Live Medel

## 2019-06-06 NOTE — PROGRESS NOTES
0700 Report received from [facility]. DX senile degeneration of brain. GIP LOC. 3988 Patient having labored breathing. RR 28. Morphine administered per MAR.   0806 . Labored breathing continues. RR 30. Morphine administered. 0916 Scheduled medications administered per MAR. Patient has fever - toradol administered per MAR. Increased secretions. Suctioning a large amount of tan thin secretions. 0830 Patient breathing is relaxed but uneven. Son is at bedside with patient. 1300 Breathing is labored. Using accessory muscles. Morphine and lorazepam administered per [facility]   1000 Patient turned to right side of bed. 1006 RR 28. Breathing is labored. Patient is groaning and grimacing. . Lorazepam and morphine administered per [facility].   1020 Patient . Family at bedside grieving appropriately. [phone] [facility] arrived to transport patient.

## 2019-06-06 NOTE — PROGRESS NOTES
1900 Report received from Neo Riggs, [facility]. Pt is GIP level of care for management of pain and agitation. Dx Senile Degeneration of the Brain. 1930 Pt is resting in bed without signs of pain or agitation. Makes a loud sigh now and then, but with no facial grimace. 2030 Scheduled medications given SQ to manage symptoms. Medication changes have been required to manage pt.  2130 Resting quietly. Pt is unresponsive to stimulus. 2230 Appears to be sleeping. Medication is effective to manage symptoms. 2330 Continue to monitor for symptoms and treat with scheduled and prn medications. 0030 Scheduled medication given to manage symptoms. Resting quietly, appears to be in no discomfort. No facial grimace or moaning. 0130 Resting quietly. Medication required SQ to manage symptoms. 0315 Pt has increased secretions and gurgling. Scheduled SQ Medication given to manage pain and secretions. Pt repositioned to far left side. Coughed to clear secretions in throat. 0400 Resting quietly. 0500 Increased secretions, has coughed up copious amt secretions. Suctioned orally to clear. 0600 Secretions copious thick yellow. Suctioned orally to clear back of throat and mouth. Mouth care given. Scheduled Morphine given for symptom management. NAME OF PATIENT:  Crissie Card    LEVEL OF CARE:  [facility]    REASON FOR GIP:   Pain, despite numerous changes in medications, Terminal agitation, despite changes to medications, Medication adjustment that must be monitored 24/7 and Stabilizing treatment that cannot take place at home    *PATIENT REMAINS ELIGIBLE FOR [facility] LEVEL OF CARE AS EVIDENCED BY: (MUST BE ADDRESSED OF PATIENT GIP)  Frequent assessment of the pt and scheduled SQ medications required to manage symptoms. Medication adjustments  have been made since admission to manage symptoms. This care cannot be managed in the home.       REASON FOR RESPITE:  na    O2 SAFETY:  na    FALL INTERVENTIONS PROVIDED:   Implemented/recommended use of fall risk identification flag to all team members, Implemented/recommended resources for alarm system (personal alarm, bed alarm, call bell, etc.) , Implemented/recommended environmental changes (remove hazards, lower bed, improve lighting, etc.) and Implemented/recommended increased supervision/assistance    INTERDISPLINARY COMMUNICATION/COLLABORATION:  Physician, MSW, [place] and RN, CNA    NEW MEDICATION INITIATION DOCUMENTATION:  Documentation completed in Clinical Note in [facility]  No new medications ordered. Reason medication is being initiated:  na    MD / Provider name consulted re: change in status / initiation of new medication: na    New Symptom(s):  na    New Order(s):  na    Name of the person notified of the changes:  na    Name of person being taught:  na    Instructions given:  na    Side Effects taught:  na    Response to teaching:  na      COMFORTABLE DYING MEASURE:  Is Patient/family satisfied with symptom level? Yes    DISCHARGE PLAN:  Remain GIP until symptoms are managed and pt can be cared for in the home.

## 2019-06-06 NOTE — HSPC IDG SOCIAL WORKER NOTES
Patient: Hao Ramirez    Date: 06/06/19  Time: 9:00 AM    [facility]  Notes         NARRATIVE   Patient is a 80year old  female wo was admitted to hospice services with dx of Senile Degeneration of Brain. She was transferred from [facility] to the [facility] for GIP LOC. Patient presentd with pain and agitation. She has a PMH of colon cancer and depression. Msw met with daughter Jorge Santizo. Patient has another daughter [facility] and a son Eduar Stovall. Both reside out of state. All 3 children share MPOA and must agree on decisions. Daughter reported her mother was at [facility] living for 6 years and then moved to [facility] where she lived for the past 4 years. She has been wc bound x 2 years. Patient has had a rapid decline in mental status over the past several months. She has been  for the past 52 years. Msw to continue to follow with emotional support for daughter as she rosa with having to make arrangements for mother's transfer to a nursing home should she stabilize for discharge.  Daughter understanding and accepting of POC         Si gned by: Ashok Velazco

## 2019-06-06 NOTE — HSPC IDG NURSE NOTES
Patient: Silverio Clayton    Date: 06/06/19  Time: 4:30 AM    Pt admitted 6/2 GIP LOC for management of symptoms of pain and agitation. Dx Senile Dementia of Brain. IV/SQ Morphine and Haldol not effective to manage symptoms of agitation. Geodon added 20mg q12 hrs as well as Morphine q 3 hrs. Pt is now controlled with minimal loud sighs at times when repositioned. No signs of pain. Increased secretions. Rosemary added. Daughter Roula Kwon lives locally.         Signed by: Kelly Zuleta RN

## 2019-06-06 NOTE — HOSPICE
Visit to patient's room today -- patient in bed - unresponsive. No family present. Continue with visits for support for family.

## 2019-06-06 NOTE — HOSPICE
[facility]  [phone]    Social Work Admission Note  Patient Name: Michelle Kong  YOB: 1924  Age: 80 y.o. Date of Visit:6-3-19  Facility of Care: [facility]  Patient Room: 15/01     Hospice Attending: Porsha Felix MD  Hospice Diagnosis: Senile Degeneration of Brain    Level of Care:    [x]  GIP    []  Respite   []  Routine    NARRATIVE   Patient is a 80year old  female wo was admitted to hospice services with dx of Senile Degeneration of Brain. She was transferred from [facility] to the [facility] for GIP LOC. Patient presentd with pain and agitation. She has a PMH of colon cancer and depression. Msw met with daughter Roseanne Botello. Patient has another daughter Marcus Lanes and a son Alison Field. Both reside out of [facility]. All 3 children share MPOA and must agree on decisions. Daughter reported her mother was at [place] [id number] living for 6 years and then moved to [facility] where she lived for the past 4 years. She has been wc bound x 2 years. Patient has had a rapid decline in mental status over the past several months. She has been  for the past 52 years. Msw to continue to follow with emotional support for daughter as she rosa with having to make arrangements for mother's transfer to a nursing home should she stabilize for discharge.     ADVANCE CARE PLANNING    Code Status: DNR  Durable DNR:x _ Yes  _ No  Advance Care Planning 2019   Patient's Healthcare Decision Maker is: Named in scanned ACP document   Confirm Advance Directive Yes, on file   Does the patient have other document types Do Not Resuscitate       Relationship Status:  []  Single     []        []      []  Domestic Partner     [x]  /  []  Common Law  []    []  Unknown    If in a relationship, name of partner/spouse:  Duration of relationship:    Quaker: Episcopalian     Home: TBD  Resources Provided: discussed nursing homes    Social Work Initial Assessment     Gender:  female    Race/Ethnicity: (isreal all that apply)  []  [place] [facility] or [place] Native  []    []  Black or [place] American  []   or   []   or [place]  [x]  [employer]  []  Unknown      Service:    []  Yes   [x]  No       []  Unknown  Appropriate for Pinning Ceremony:   []  Yes      []  No  Is patient using VA benefits?    []  Yes      []  No     Primary Language: English  []   Needed  []   utilized during visit    Ability to express thoughts/needs/feelings  []  Expressed thoughts/feelings/needs without difficulty  []  Requires extra time and cuing  []  Speech limited single words  []  Uses only gestures (eye, blinking eye or head movement/pointing)  []  Unable to express thoughts/feelings/needs (speech unintelligible or inappropriate)  [x]  Unresponsive  Notes:      Mental Status:  []  Alert-oriented to:     []  Person     []  Place     []  Time  []  Comatose-responds to:    []   Verbal stimuli    []  Tactile stimuli    []  Painful stimuli  []  Forgetful  []  Disoriented/Confused  []  Lethargic  []  Agitated  []  Other (specify):    Notes:      Patients description of Illness/Current Health Status:    [x]  Patient unable to discuss  []  Patient unwilling to discuss  []  (Specify)        Knowledge/Understanding of Disease Process  Patient:    []  Demonstrates knowledge/understanding of disease process   []  Demonstrates knowledge/understanding of treatment plan   []  Demonstrates knowledge/understanding of prognosis   []  Demonstrates acceptance of prognosis   []  Demonstrates knowledge/understanding of resuscitation status   []  Other (specify)  Caregiver:   [x]  Demonstrates knowledge/understanding of disease process   [x]  Demonstrates knowledge/understanding of treatment plan   [x]  Demonstrates knowledge/understanding of prognosis   [x]  Demonstrates acceptance of prognosis   [x]  Demonstrates knowledge/understanding of resuscitation status   []  Other (specify)  Notes:      Patients living arrangement/care setting:  Use the PRIOR COLUMN when the PATIENTS current health status necessitated a change in his/her primary residence. Prior Current Response              []             []    Patients own home/residence              []             []    Home of family member/friend              []             []    Boarding home              [x]             []    Assisted living facility/shelter center              []             []    Hospital/Acute care facility              []             []    Skilled nursing facility              []             []    Long term care facility/Nursing home              []             [x]    Hospice in Patient      Primary Caregiver:  []  No Primary Caregiver  Name of Primary Caregiver: Gisele Moser  Relationship or Primary Caregiver:    []  Spouse/Significant other       [x]  Natural Child        []  Step child       []  Sibling   []  Parent   []  Friend/Neighbor   []  Community/Sabianism Volunteer   []  Paid help   []  Other (specify):___________  Notes:       Family members/Significant others:  Name: Graham Rees  Relationship:daughter  Phone Number:  Actively involved in care? []  Yes  []  No    Name:   Eugenio Calvillo  Relationship:sonPhone Number:  Actively involved in care? []  Yes  []  No    Name:  Relationship:  Phone Number:  Actively involved in care?   []  Yes  []  No    Social support systems: (select ONE best description)  [x]  Excellent social support system which includes three or more family members or friends  []  Good social support system which includes two or less members or friends  []  [address] support which includes one family member or friend  []  Poor social support; no family members or friends; basically ALONE  Notes:      Emotional Status: (isreal all that apply)    Patient Caregiver Response                 []                [x] Mood/Affect stable and appropriate                   []                []    Angry                 []                []    Anxious                 []                []    Apprehensive                 []                []    Avoidant                 []                []    Clinging                 []                []    Depressed                 []                []    Distraught                 []                []    Elated                 []                []    Euphoric                 []                []    Fearful                 []                []    Flat Affect                 []                []    Helpless                 []                []    Hostile                 []                []    Impulsive                 []                []    Irritable                 []                []    Labile                 []                []    Manic                 []                []    Restlessness                 []                []    Sad                 []                []    Suspicious                 []                []    Tearful                 []                []    Withdrawn     Notes:     Coping Skills (strengths/weakness):    Patient: Coping Skills (strength/weakness): nidia Family/caregiver (strength/weakness):family support/acceptance     [place] of care (isreal all that apply):     [x]  No burden evident   []  Family must administer medications   []  Illness causing financial strain   []  Family/Support feels overwhelmed   []  Family/Support sleep disturbed with patients care   []  Patients care causes extra physical stress  of death  []  Illness causes changes in family lifestyle  []  Illness impacting family/support employment  []  Family experiencing increased time demands  []  Patients behavior endangers family  []  Denial of patients illness  []  Concern over outcome of illness/fear  []  Patients behavior embarrassing to family   Notes:      Risk Factors: (isreal all that apply):    [x]  No burden evident   []  Alcohol abuse  []  Financial resources inadequate to meet basic needs (food/house/etc)  []  Financial resources inadequate to meet health care needs (supplies/equipment/medications)  []  Food/nutrition resources inadequate  []  Home environment unsafe/inadequate for home care  []  Homicidal risk  []  Lives alone or without concerned relatives  []  Multiple medications/complex schedule  []  Physical limitations increase likelihood of falls  []  Plan of care/treatments complicated  []  Substance use/abuse  []  Suicidal risk  []  Visual impairment threatens safety/ability to perform self-care  []  Other (specify):     Abuse/Neglect (actual/potential risks):  [x]  No signs of abuse/neglect  []  History of abuse/neglect                 []  [id number]          []  Sexual  []  History of domestic violence  []  Lacks adequate physical care  []  Lacks emotional nurturing/support  []  Lacks appropriate stimulation/cognitive experiences  []  Left alone inappropriately  []  Lacks necessary supervision  []  Inadequate or delayed medical care  []  Unsafe environment (i.e guns/drug use/history of violence in the home/etc.)  []  Bruising or other physical signs of injury present  []  Other (specify):  Notes:   []  Refer to child/adult protective services      Current Sources of Stress (in Addition to Current Illness):   []  None reported  []  Bills/Debt    []  Career/Job change    []   (short term)    []   (long term)    []  Death of a child (recent)    []  Death of a parent (recent)   []  Death of a spouse (recent)   []  Employment status changed   []  Family discord    []  Financial loss/Inadequate inther (specify):come  []  Job loss  []  Legal issues unresolved  []  Lifestyle change  []  Marital discord  []  Marriage within the last year  []  Paperwork (insurance/legal/etc) overwhelming  []  Separation/Divorce  [x]  Other (specify):  Notes: possible need for NHP   Current Community Resources Being Utilized     1. [facility]      Interventions/Plan of Care     1. Assess social and emotional factors related to coping with end of life issuesxxxxCommunity resource planning/referral   2.  Relocation to different care setting if/when symptoms stabilizexx    Discharge Planning     Possible NHP should patient stabilize for discharge    MSW Assessment Completed by: Reymundo Bran    6-3-19    Time In:3p     Time Out:430p

## 2019-07-12 NOTE — PROGRESS NOTES
Conversation held with Dr. Jae Rojas in regard to current status of patient. Dr. Jae Rojas stated that the patient will be discharged to hospice with comfort measures only. Family discussed with him yesterday the fever and potential causation and treatments and opted not to investigate the cause due to the fact that the treatment may require injections and follow up lab work. Heart monitor to be removed and patient medicated and treated for comfort. [address] report to Glenn Lopez at the [facility] at [phone] and IV will be left in for transport to the hospice house for medication administration due to the fact that the patient is unable to tolerate PO. Care provided as Sesar Cons will permit after medicated for pain. Daughter at bedside to comfort patient. room air

## 2023-02-10 NOTE — PROGRESS NOTES
NAME OF PATIENT:  Pushpa Nair    LEVEL OF CARE:  GIP    REASON FOR GIP:   Pain, despite numerous changes in medications, Terminal agitation, despite changes to medications and Stabilizing treatment that cannot take place at home    *PATIENT REMAINS ELIGIBLE FOR [facility] LEVEL OF CARE AS EVIDENCED BY: Need for PRN medications for agitation/pain    REASON FOR RESPITE:  N/A    O2 SAFETY:  N/A    FALL INTERVENTIONS PROVIDED:   Implemented/recommended use of non-skid footwear, Implemented/recommended use of fall risk identification flag to all team members, Implemented/recommended environmental changes (remove hazards, lower bed, improve lighting, etc.) and Implemented/recommended increased supervision/assistance    INTERDISPLINARY COMMUNICATION/COLLABORATION:  Physician, MSW, Iqra and RN, CNA    NEW MEDICATION INITIATION DOCUMENTATION:  Consulted AT MD to report change in pt status    Reason medication is being initiated:  Increased agitation/restlessness     MD / Provider name consulted re: change in status / initiation of new medication:  ROSA ISELA Pelletier Symptom(s):  Extreme agitation    New Order(s):  10mg Geodon IM once    Name of the person notified of the changes:  Daughter    Name of person being taught:  Daughter    Instructions given:  yes    Side Effects taught:  yes    Response to teaching:  receptive      COMFORTABLE DYING MEASURE:  Is Patient/family satisfied with symptom level?  yes    DISCHARGE PLAN:  pending      0700 Report Received from Nisha 47 Patient extremetly agitated this AM. Patient calling out \"help me\" and taking off gown. Gave Patient PRN Haldol and morphine. Will continue to monitor. 1681 Patient continues to call out and is ripping off gown and grabbing at staff. Called NP Favian Amaro, ROSA ISELA Hammond ordered one time dose of Geodon. Will continue to monitor. 4221 Gave one time dose of Geodon, will continue to monitor.    2864 Patient resting in bed with eyes closed, appears to be sleeping. Respirations are even and unlabored. 2568 Patient calling out again, pulling at gown. Gave PRN morphine, will continue to monitor. 6940 Patient still moaning and calling out, gave PRN morphine. Will continue to monitor. 21  Patient resting in bed quietly with eyes closed, respirations are even and unlabored. 1037 Patient moaning out and itching at arms and abdomen. Gave PRN benadryl and morphine. Will cotninue to monitor. 1134 Patient given PRN haldol and PRN and morphine for moaning and agitation. Will continue to monitor. Patient's daughter and son in law at the bedside, updated on patient's night and morning. 1200 Patient resting quietly in bed, respirations are even and unlabored. 1251 Patient bathed by ALEK Stephens. New dressing applied to left leg skin tears, patient tolerated procedures well. 1320 Patient resting in bed quietly, daughter is at the bedside. 1400 Patient noted to have more facial grimacing. Gave PRN IV morphine. Will continue to monitor. 1428 Patient resting quietly, respirations are even and unlabored. 1445 Scheduled Haldol and morphine given, patient resting in bed quietly. 1500 Patient resting in bed with eyes closed, respirations are even and unlabored. 1552 Patient noted to be more agitated and pulling at gown and grabbing at staff. Patient was also noted to be itching at skin. Gave PRN morphine, haldol, and benadryl. NP Ene Hebert gave one time order of Geodon and scheduled Geodon q12hr.   2750 Patient given one time dose of Geodon, patient resting in bed quietly. 1635 Patient resting in bed quietly, eyes closed, respirations are even and unlabored. 80 Patient's daughter and grandchildren at the bedside, patient resting in bed quietly, respirations are even and unlabored. 1815 Patient given scheduled morphine. Patient's daughters and grandsons left the bedside.  Patient resting in bed quietly, respirations are even and unlabored. 1851 Patient repositioned in bed by [facility] RN and Cm Moe CNA. Patient tolerated well, respirations are even and unlabored. 1900 Report given to [facility]. no
